# Patient Record
Sex: MALE | Race: WHITE | NOT HISPANIC OR LATINO | Employment: FULL TIME | URBAN - METROPOLITAN AREA
[De-identification: names, ages, dates, MRNs, and addresses within clinical notes are randomized per-mention and may not be internally consistent; named-entity substitution may affect disease eponyms.]

---

## 2021-03-20 ENCOUNTER — IMMUNIZATION (OUTPATIENT)
Dept: PRIMARY CARE CLINIC | Facility: CLINIC | Age: 46
End: 2021-03-20

## 2021-03-20 DIAGNOSIS — Z23 NEED FOR VACCINATION: Primary | ICD-10-CM

## 2021-03-20 PROCEDURE — 0001A PR IMMUNIZ ADMIN, SARS-COV-2 COVID-19 VACC, 30MCG/0.3ML, 1ST DOSE: ICD-10-PCS | Mod: CV19,S$GLB,, | Performed by: INTERNAL MEDICINE

## 2021-03-20 PROCEDURE — 0001A PR IMMUNIZ ADMIN, SARS-COV-2 COVID-19 VACC, 30MCG/0.3ML, 1ST DOSE: CPT | Mod: CV19,S$GLB,, | Performed by: INTERNAL MEDICINE

## 2021-03-20 PROCEDURE — 91300 PR SARS-COV- 2 COVID-19 VACCINE, NO PRSV, 30MCG/0.3ML, IM: ICD-10-PCS | Mod: S$GLB,,, | Performed by: INTERNAL MEDICINE

## 2021-03-20 PROCEDURE — 91300 PR SARS-COV- 2 COVID-19 VACCINE, NO PRSV, 30MCG/0.3ML, IM: CPT | Mod: S$GLB,,, | Performed by: INTERNAL MEDICINE

## 2021-03-20 RX ADMIN — Medication 0.3 ML: at 09:03

## 2021-04-11 ENCOUNTER — IMMUNIZATION (OUTPATIENT)
Dept: PRIMARY CARE CLINIC | Facility: CLINIC | Age: 46
End: 2021-04-11

## 2021-04-11 DIAGNOSIS — Z23 NEED FOR VACCINATION: Primary | ICD-10-CM

## 2021-04-11 PROCEDURE — 0002A PR IMMUNIZ ADMIN, SARS-COV-2 COVID-19 VACC, 30MCG/0.3ML, 2ND DOSE: CPT | Mod: CV19,S$GLB,, | Performed by: INTERNAL MEDICINE

## 2021-04-11 PROCEDURE — 0002A PR IMMUNIZ ADMIN, SARS-COV-2 COVID-19 VACC, 30MCG/0.3ML, 2ND DOSE: ICD-10-PCS | Mod: CV19,S$GLB,, | Performed by: INTERNAL MEDICINE

## 2021-04-11 PROCEDURE — 91300 PR SARS-COV- 2 COVID-19 VACCINE, NO PRSV, 30MCG/0.3ML, IM: ICD-10-PCS | Mod: S$GLB,,, | Performed by: INTERNAL MEDICINE

## 2021-04-11 PROCEDURE — 91300 PR SARS-COV- 2 COVID-19 VACCINE, NO PRSV, 30MCG/0.3ML, IM: CPT | Mod: S$GLB,,, | Performed by: INTERNAL MEDICINE

## 2021-04-11 RX ADMIN — Medication 0.3 ML: at 11:04

## 2022-10-17 ENCOUNTER — TELEPHONE (OUTPATIENT)
Dept: OTOLARYNGOLOGY | Facility: CLINIC | Age: 47
End: 2022-10-17

## 2022-10-17 ENCOUNTER — PATIENT MESSAGE (OUTPATIENT)
Dept: OTOLARYNGOLOGY | Facility: CLINIC | Age: 47
End: 2022-10-17

## 2022-10-17 NOTE — TELEPHONE ENCOUNTER
Called and LMOM advising pt that his appt on 11-11 had been rescheduled to 12-15 at 2:45pm because Dr. Carl will be in surgery on 11-11. Also sent MO message

## 2022-12-15 ENCOUNTER — OFFICE VISIT (OUTPATIENT)
Dept: OTOLARYNGOLOGY | Facility: CLINIC | Age: 47
End: 2022-12-15
Payer: COMMERCIAL

## 2022-12-15 VITALS — HEIGHT: 70 IN | WEIGHT: 259.06 LBS | BODY MASS INDEX: 37.09 KG/M2

## 2022-12-15 DIAGNOSIS — J32.0 CHRONIC MAXILLARY SINUSITIS: ICD-10-CM

## 2022-12-15 DIAGNOSIS — J34.89 NASAL OBSTRUCTION: ICD-10-CM

## 2022-12-15 DIAGNOSIS — G47.33 OBSTRUCTIVE SLEEP APNEA: Primary | ICD-10-CM

## 2022-12-15 DIAGNOSIS — J34.3 HYPERTROPHY OF INFERIOR NASAL TURBINATE: ICD-10-CM

## 2022-12-15 DIAGNOSIS — J30.2 SEASONAL ALLERGIC RHINITIS, UNSPECIFIED TRIGGER: ICD-10-CM

## 2022-12-15 PROCEDURE — 99204 PR OFFICE/OUTPT VISIT, NEW, LEVL IV, 45-59 MIN: ICD-10-PCS | Mod: S$GLB,,, | Performed by: OTOLARYNGOLOGY

## 2022-12-15 PROCEDURE — 1159F MED LIST DOCD IN RCRD: CPT | Mod: CPTII,S$GLB,, | Performed by: OTOLARYNGOLOGY

## 2022-12-15 PROCEDURE — 3008F BODY MASS INDEX DOCD: CPT | Mod: CPTII,S$GLB,, | Performed by: OTOLARYNGOLOGY

## 2022-12-15 PROCEDURE — 1159F PR MEDICATION LIST DOCUMENTED IN MEDICAL RECORD: ICD-10-PCS | Mod: CPTII,S$GLB,, | Performed by: OTOLARYNGOLOGY

## 2022-12-15 PROCEDURE — 99204 OFFICE O/P NEW MOD 45 MIN: CPT | Mod: S$GLB,,, | Performed by: OTOLARYNGOLOGY

## 2022-12-15 PROCEDURE — 3008F PR BODY MASS INDEX (BMI) DOCUMENTED: ICD-10-PCS | Mod: CPTII,S$GLB,, | Performed by: OTOLARYNGOLOGY

## 2022-12-15 RX ORDER — AZELASTINE 1 MG/ML
1 SPRAY, METERED NASAL 2 TIMES DAILY
Qty: 30 ML | Refills: 3 | Status: SHIPPED | OUTPATIENT
Start: 2022-12-15

## 2022-12-15 RX ORDER — SERTRALINE HYDROCHLORIDE 100 MG/1
TABLET, FILM COATED ORAL
COMMUNITY

## 2022-12-15 RX ORDER — ALBUTEROL SULFATE 90 UG/1
AEROSOL, METERED RESPIRATORY (INHALATION)
COMMUNITY

## 2022-12-15 RX ORDER — FLUTICASONE PROPIONATE 50 MCG
2 SPRAY, SUSPENSION (ML) NASAL 2 TIMES DAILY
Qty: 18.2 ML | Refills: 3 | Status: SHIPPED | OUTPATIENT
Start: 2022-12-15

## 2022-12-15 NOTE — PROGRESS NOTES
OTOLARYNGOLOGY CLINIC NOTE  Date:  12/15/2022     Chief complaint:  Chief Complaint   Patient presents with    inspire consult     Sleep study done 2019       History of Present Illness  Gentry Dowling is a 47 y.o. male  presenting today for a new evaluation and treatment of snoring and ROXANNE. Self referral.     [place] sleepy score 14  NOSE questionaire 60     Had a deviated septum surgery and turbinate hypertrophy in 2000, still feels like has to force one side of nose open when he breathes. Alternates nasonex and flonase.  Has also tried astelin but not saline  Gets sinus infections about 3 times per year.    Had home sleep study in 2019 and showed AHI of 6  Snores a lot, has a lot of fatigue     Past Medical History  No past medical history on file.     Past Surgical History  No past surgical history on file.     Medications  No current outpatient medications on file prior to visit.     No current facility-administered medications on file prior to visit.       Review of Systems  Review of Systems   Constitutional:  Positive for malaise/fatigue.   Eyes: Negative.    Cardiovascular: Negative.    Gastrointestinal: Negative.    Musculoskeletal:  Positive for back pain and neck pain.   Skin: Negative.    Neurological:  Positive for headaches.   Psychiatric/Behavioral:  Positive for depression.     Answers submitted by the patient for this visit:  Review of Symptoms Questionnaire  (Submitted on 12/15/2022)  appetite change : Yes  sinus pressure : Yes  Snoring?: Yes  Sleep Apnea?: Yes  Muscle aches / pain?: Yes  Seasonal Allergies?: Yes  decreased concentration: Yes  sleep disturbance: Yes    Social History        Family History  No family history on file.     Physical Exam   There were no vitals filed for this visit. Body mass index is 37.17 kg/m².          GENERAL: no acute distress.  HEAD: normocephalic.   EYES: lids and lashes normal. No scleral icterus  EARS: external ear without lesion, normal pinna shape and  position.    NOSE: external nose without significant bony abnormality, right valve collapse  but feels breathing better on right compared to left; turbinate hypertrophy, no purulent drainage  ORAL CAVITY/OROPHARYNX: tongue midline and mobile. Symmetric palate rise. Uvula midline.   NECK: trachea midline.   LYMPH NODES:No cervical lymphadenopathy.  RESPIRATORY: no stridor, no stertor. Voice normal. Respirations nonlabored.  NEURO: alert, responds to questions appropriately.   PSYCH:mood appropriate      Imaging:  The patient does not have any pertinent and/or recent imaging of the head and neck.     Labs:  CBC      BMP      COAGS        Assessment  1. Obstructive sleep apnea    2. Nasal obstruction  - CT Medtronic Sinuses without; Future    3. Chronic maxillary sinusitis  - CT Medtronic Sinuses without; Future    4. Seasonal allergic rhinitis, unspecified trigger    5. BMI 37.0-37.9, adult    6. Hypertrophy of inferior nasal turbinate       Plan:  Discussed plan of care with patient in detail and all questions answered. Patient reported understanding of plan of care.   ROXANNE: does not meet criteria for inspire due to AHI and BMI- reviewed criteria for this , if able to lose weight to get to bmi needed for next step ( DISE) would recommend PSG to ensure accurate AHI  Discussed other options such as septoplasty and nasal procedures which can help with pressure with mask but is not curative. Oral appliance is also an option.     Allergic rhinitis(AR) and recurrent sinusitis: We discussed about treatment options for this. I recommend dual nasal spray therapy as combo of steroid and antihistamine nasal spray has been shown in evidence based studies to be better than either alone.Discussed medication administration technique ( point toward outer corner of eye and not towards nasal septum) and use nasal saline prior to medication sprays. Counseled on risk of bleeding, risk of increased intraocular pressure/cataract with long  term use. We discussed importance of saline use prior to medication sprays to help sprays work better and to clean the nose. We discussed and physical documentation with photos were given to the patient about the saline and other medication sprays ( paperwork summarized discussion topics)  Ct in 2-3 weeks after back on nasal sprays   Rigid scope at follow up depending on scan and response to sprays  F/u 2 months      Please be aware that this note has been generated with the assistance of MModal voice-to-text.  Please excuse any spelling or grammatical errors.

## 2022-12-15 NOTE — PATIENT INSTRUCTIONS
"Information and instructions from your visit with me today:    Start using the following medication nasal sprays:   Fluticasone spray:    This medication is a steroid spray. It stays within the nose and does not have absorption into the body that leads to side effects that one has with oral steroid medication. Fluticasone nasal spray is the same as the Flonase brand nasal spray. Discuss with your pharmacist if the price is lower over the counter or with a prescription ( this varies depending on insurance). The medication that is over the counter is the same as the prescription medication. Use this medication as instructed on the prescription, 1-2 sprays on each side of your nose twice daily.     Azelastine  spray:  This medication is an antihistamine used to treat nasal symptoms of allergy, which works specifically in the nose unlike antihistamine pills which have more of an effect on the whole body. Use this medication as instructed on the prescription, 1 spray on each side of your nose twice daily.     Additional instructions for medication sprays  Place the tip of the medication bottle in your nose and aim slightly up and out on each side to get medication high and deep into your nose and sinuses, and not have it all deposit in the very front of your nose. Aim the tip of the nozzle towards the outer corner of your eye . You can imagine aiming towards the back of your eyeball on each side for this, as opposed to straight back to the center of your nose and head.     You need to use this medication every day regardless of symptoms, as it takes time ( a few weeks) to work and get the benefits. It does not work on an "as needed" basis like taking a decongestant. If your symptoms only occur in a particular season, then the medication can be used seasonally instead of year long. For seasonal symptoms, you should start using the spray twice daily a month before when you normally have symptoms ( for example, if symptoms " start in August, should start at the end of June).     Start nasal irrigations with saline solution- you can either use a rinse or a mist spray:      NASAL SALINE SPRAY ( simply saline and arm and hammer are examples) There are several different brands found in the cold and flu aisle of the pharmacy. You can use any brand of saline spray - this will deliver the saline by a gentle mist ( if you have difficulty or discomfort with nasal rinse/ a lot of fluid in the nose, this will be more comfortable).       Always rinse your nose with saline prior to using medication sprays and wait a couple of hours before using again. You can use the saline throughout the day to help with stuffy nose or dry nose.    Do not use nasal decongestant sprays such as Afrin or similar products long term ( over 3 days) .  This can cause long term physical nasal addiction. Afrin should only be used if having nose bleeds, severe nasal congestion , or severe ear pain/fullness and should not be used for more than 2-3 days in a row . It is a not a medication that should be used for a long period of time.     It was nice meeting you today, and I look forward to helping you feel better soon. Please don't hesitate to call if you have any other questions or concerns, or if I can be of any assistance in the meantime.      Eliz Carl MD    [facility]     Phone  [phone]    Fax      [phone]        Eliz Carl MD  Otorhinolaryngology

## 2024-11-17 ENCOUNTER — HOSPITAL ENCOUNTER (INPATIENT)
Facility: HOSPITAL | Age: 49
LOS: 10 days | Discharge: HOME | DRG: 872 | End: 2024-11-27
Attending: STUDENT IN AN ORGANIZED HEALTH CARE EDUCATION/TRAINING PROGRAM | Admitting: FAMILY MEDICINE
Payer: MEDICAID

## 2024-11-17 DIAGNOSIS — R50.9 FEVER: ICD-10-CM

## 2024-11-17 DIAGNOSIS — R07.9 CHEST PAIN: ICD-10-CM

## 2024-11-17 DIAGNOSIS — A41.9 SEPSIS WITHOUT ACUTE ORGAN DYSFUNCTION: ICD-10-CM

## 2024-11-17 DIAGNOSIS — R50.9 FEVER OF UNKNOWN ORIGIN: Primary | ICD-10-CM

## 2024-11-17 DIAGNOSIS — A41.9 SEPSIS, DUE TO UNSPECIFIED ORGANISM, UNSPECIFIED WHETHER ACUTE ORGAN DYSFUNCTION PRESENT: ICD-10-CM

## 2024-11-17 PROBLEM — F17.200 TOBACCO DEPENDENCY: Status: ACTIVE | Noted: 2024-11-17

## 2024-11-17 LAB
ALBUMIN SERPL BCP-MCNC: 2.8 G/DL (ref 3.5–5.2)
ALLENS TEST: NORMAL
ALP SERPL-CCNC: 102 U/L (ref 40–150)
ALT SERPL W/O P-5'-P-CCNC: 41 U/L (ref 10–44)
ANION GAP SERPL CALC-SCNC: 10 MMOL/L (ref 8–16)
AST SERPL-CCNC: 18 U/L (ref 10–40)
BACTERIA #/AREA URNS AUTO: NORMAL /HPF
BASOPHILS # BLD AUTO: 0.05 K/UL (ref 0–0.2)
BASOPHILS NFR BLD: 0.4 % (ref 0–1.9)
BILIRUB SERPL-MCNC: 0.9 MG/DL (ref 0.1–1)
BILIRUB UR QL STRIP: ABNORMAL
BUN SERPL-MCNC: 12 MG/DL (ref 6–20)
CALCIUM SERPL-MCNC: 9 MG/DL (ref 8.7–10.5)
CHLORIDE SERPL-SCNC: 102 MMOL/L (ref 95–110)
CK SERPL-CCNC: 144 U/L (ref 20–200)
CLARITY UR REFRACT.AUTO: CLEAR
CO2 SERPL-SCNC: 26 MMOL/L (ref 23–29)
COLOR UR AUTO: YELLOW
CREAT SERPL-MCNC: 1.1 MG/DL (ref 0.5–1.4)
DIFFERENTIAL METHOD BLD: ABNORMAL
EOSINOPHIL # BLD AUTO: 0 K/UL (ref 0–0.5)
EOSINOPHIL NFR BLD: 0.3 % (ref 0–8)
ERYTHROCYTE [DISTWIDTH] IN BLOOD BY AUTOMATED COUNT: 13 % (ref 11.5–14.5)
EST. GFR  (NO RACE VARIABLE): >60 ML/MIN/1.73 M^2
GLUCOSE SERPL-MCNC: 97 MG/DL (ref 70–110)
GLUCOSE UR QL STRIP: NEGATIVE
HCT VFR BLD AUTO: 41.5 % (ref 40–54)
HCV AB SERPL QL IA: NORMAL
HGB BLD-MCNC: 13.6 G/DL (ref 14–18)
HGB UR QL STRIP: NEGATIVE
HIV 1+2 AB+HIV1 P24 AG SERPL QL IA: NORMAL
HYALINE CASTS UR QL AUTO: 0 /LPF
IMM GRANULOCYTES # BLD AUTO: 0.05 K/UL (ref 0–0.04)
IMM GRANULOCYTES NFR BLD AUTO: 0.4 % (ref 0–0.5)
INFLUENZA A, MOLECULAR: NEGATIVE
INFLUENZA B, MOLECULAR: NEGATIVE
KETONES UR QL STRIP: ABNORMAL
LDH SERPL L TO P-CCNC: 0.83 MMOL/L (ref 0.5–2.2)
LEUKOCYTE ESTERASE UR QL STRIP: NEGATIVE
LYMPHOCYTES # BLD AUTO: 1.1 K/UL (ref 1–4.8)
LYMPHOCYTES NFR BLD: 8.2 % (ref 18–48)
MCH RBC QN AUTO: 28.2 PG (ref 27–31)
MCHC RBC AUTO-ENTMCNC: 32.8 G/DL (ref 32–36)
MCV RBC AUTO: 86 FL (ref 82–98)
MICROSCOPIC COMMENT: NORMAL
MONOCYTES # BLD AUTO: 0.7 K/UL (ref 0.3–1)
MONOCYTES NFR BLD: 5.4 % (ref 4–15)
NEUTROPHILS # BLD AUTO: 11.6 K/UL (ref 1.8–7.7)
NEUTROPHILS NFR BLD: 85.3 % (ref 38–73)
NITRITE UR QL STRIP: NEGATIVE
NRBC BLD-RTO: 0 /100 WBC
PH UR STRIP: 6 [PH] (ref 5–8)
PLATELET # BLD AUTO: 366 K/UL (ref 150–450)
PMV BLD AUTO: 9 FL (ref 9.2–12.9)
POTASSIUM SERPL-SCNC: 3.8 MMOL/L (ref 3.5–5.1)
PROT SERPL-MCNC: 7.1 G/DL (ref 6–8.4)
PROT UR QL STRIP: ABNORMAL
RBC # BLD AUTO: 4.82 M/UL (ref 4.6–6.2)
RBC #/AREA URNS AUTO: 2 /HPF (ref 0–4)
SAMPLE: NORMAL
SARS-COV-2 RDRP RESP QL NAA+PROBE: NEGATIVE
SITE: NORMAL
SODIUM SERPL-SCNC: 138 MMOL/L (ref 136–145)
SP GR UR STRIP: 1.02 (ref 1–1.03)
SPECIMEN SOURCE: NORMAL
SQUAMOUS #/AREA URNS AUTO: 1 /HPF
URN SPEC COLLECT METH UR: ABNORMAL
WBC # BLD AUTO: 13.62 K/UL (ref 3.9–12.7)
WBC #/AREA URNS AUTO: 3 /HPF (ref 0–5)

## 2024-11-17 PROCEDURE — 99900035 HC TECH TIME PER 15 MIN (STAT)

## 2024-11-17 PROCEDURE — 83605 ASSAY OF LACTIC ACID: CPT

## 2024-11-17 PROCEDURE — 93005 ELECTROCARDIOGRAM TRACING: CPT

## 2024-11-17 PROCEDURE — 81001 URINALYSIS AUTO W/SCOPE: CPT

## 2024-11-17 PROCEDURE — 21400001 HC TELEMETRY ROOM

## 2024-11-17 PROCEDURE — 87635 SARS-COV-2 COVID-19 AMP PRB: CPT

## 2024-11-17 PROCEDURE — 99285 EMERGENCY DEPT VISIT HI MDM: CPT | Mod: 25

## 2024-11-17 PROCEDURE — 87040 BLOOD CULTURE FOR BACTERIA: CPT

## 2024-11-17 PROCEDURE — 25000003 PHARM REV CODE 250

## 2024-11-17 PROCEDURE — 25000003 PHARM REV CODE 250: Performed by: PHYSICIAN ASSISTANT

## 2024-11-17 PROCEDURE — 80053 COMPREHEN METABOLIC PANEL: CPT

## 2024-11-17 PROCEDURE — 93010 ELECTROCARDIOGRAM REPORT: CPT | Mod: ,,, | Performed by: STUDENT IN AN ORGANIZED HEALTH CARE EDUCATION/TRAINING PROGRAM

## 2024-11-17 PROCEDURE — 82550 ASSAY OF CK (CPK): CPT

## 2024-11-17 PROCEDURE — 87389 HIV-1 AG W/HIV-1&-2 AB AG IA: CPT

## 2024-11-17 PROCEDURE — 86803 HEPATITIS C AB TEST: CPT

## 2024-11-17 PROCEDURE — 85025 COMPLETE CBC W/AUTO DIFF WBC: CPT

## 2024-11-17 PROCEDURE — 96374 THER/PROPH/DIAG INJ IV PUSH: CPT

## 2024-11-17 PROCEDURE — 87502 INFLUENZA DNA AMP PROBE: CPT

## 2024-11-17 PROCEDURE — 11000001 HC ACUTE MED/SURG PRIVATE ROOM

## 2024-11-17 PROCEDURE — 63600175 PHARM REV CODE 636 W HCPCS

## 2024-11-17 RX ORDER — ACETAMINOPHEN 325 MG/1
650 TABLET ORAL
Status: COMPLETED | OUTPATIENT
Start: 2024-11-17 | End: 2024-11-17

## 2024-11-17 RX ORDER — IBUPROFEN 200 MG
24 TABLET ORAL
Status: DISCONTINUED | OUTPATIENT
Start: 2024-11-17 | End: 2024-11-27 | Stop reason: HOSPADM

## 2024-11-17 RX ORDER — ENOXAPARIN SODIUM 100 MG/ML
40 INJECTION SUBCUTANEOUS EVERY 24 HOURS
Status: DISCONTINUED | OUTPATIENT
Start: 2024-11-18 | End: 2024-11-23

## 2024-11-17 RX ORDER — ACETAMINOPHEN 325 MG/1
650 TABLET ORAL EVERY 4 HOURS PRN
Status: DISCONTINUED | OUTPATIENT
Start: 2024-11-17 | End: 2024-11-27 | Stop reason: HOSPADM

## 2024-11-17 RX ORDER — TALC
6 POWDER (GRAM) TOPICAL NIGHTLY PRN
Status: DISCONTINUED | OUTPATIENT
Start: 2024-11-17 | End: 2024-11-27 | Stop reason: HOSPADM

## 2024-11-17 RX ORDER — POLYETHYLENE GLYCOL 3350 17 G/17G
17 POWDER, FOR SOLUTION ORAL DAILY PRN
Status: DISCONTINUED | OUTPATIENT
Start: 2024-11-17 | End: 2024-11-27 | Stop reason: HOSPADM

## 2024-11-17 RX ORDER — IBUPROFEN 200 MG
16 TABLET ORAL
Status: DISCONTINUED | OUTPATIENT
Start: 2024-11-17 | End: 2024-11-27 | Stop reason: HOSPADM

## 2024-11-17 RX ORDER — DOCUSATE SODIUM 100 MG
300 CAPSULE ORAL
Status: DISCONTINUED | OUTPATIENT
Start: 2024-11-17 | End: 2024-11-27 | Stop reason: HOSPADM

## 2024-11-17 RX ORDER — GLUCAGON 1 MG
1 KIT INJECTION
Status: DISCONTINUED | OUTPATIENT
Start: 2024-11-17 | End: 2024-11-27 | Stop reason: HOSPADM

## 2024-11-17 RX ORDER — BISACODYL 10 MG/1
10 SUPPOSITORY RECTAL DAILY PRN
Status: DISCONTINUED | OUTPATIENT
Start: 2024-11-17 | End: 2024-11-27 | Stop reason: HOSPADM

## 2024-11-17 RX ORDER — IPRATROPIUM BROMIDE AND ALBUTEROL SULFATE 2.5; .5 MG/3ML; MG/3ML
3 SOLUTION RESPIRATORY (INHALATION) EVERY 4 HOURS PRN
Status: DISCONTINUED | OUTPATIENT
Start: 2024-11-17 | End: 2024-11-27 | Stop reason: HOSPADM

## 2024-11-17 RX ORDER — IBUPROFEN 200 MG
1 TABLET ORAL DAILY PRN
Status: DISCONTINUED | OUTPATIENT
Start: 2024-11-17 | End: 2024-11-17

## 2024-11-17 RX ORDER — KETOROLAC TROMETHAMINE 30 MG/ML
10 INJECTION, SOLUTION INTRAMUSCULAR; INTRAVENOUS
Status: COMPLETED | OUTPATIENT
Start: 2024-11-17 | End: 2024-11-17

## 2024-11-17 RX ADMIN — ACETAMINOPHEN 650 MG: 325 TABLET ORAL at 06:11

## 2024-11-17 RX ADMIN — PIPERACILLIN SODIUM AND TAZOBACTAM SODIUM 4.5 G: 4; .5 INJECTION, POWDER, FOR SOLUTION INTRAVENOUS at 07:11

## 2024-11-17 RX ADMIN — KETOROLAC TROMETHAMINE 10 MG: 30 INJECTION, SOLUTION INTRAMUSCULAR; INTRAVENOUS at 06:11

## 2024-11-17 RX ADMIN — SODIUM CHLORIDE 1000 ML: 9 INJECTION, SOLUTION INTRAVENOUS at 07:11

## 2024-11-17 RX ADMIN — VANCOMYCIN HYDROCHLORIDE 2000 MG: 10 INJECTION, POWDER, LYOPHILIZED, FOR SOLUTION INTRAVENOUS at 07:11

## 2024-11-17 RX ADMIN — ACETAMINOPHEN 650 MG: 325 TABLET ORAL at 10:11

## 2024-11-17 NOTE — ED PROVIDER NOTES
Encounter Date: 11/17/2024       History     Chief Complaint   Patient presents with    Fever     Pt reports intermittent fever for about a week. Pt reports highest temp of 101.6. Pt has been taking tylenol.     48-year-old male with no significant past medical history presents to the ED regarding fever and fatigue onset 1 week.  He has been taking Tylenol drinking Pedialyte.  States his temperature will alternate from 94° F all the way to 101.7° F accompanied with alternating chills and sweats. Also complaining of cramps to lower legs and hands. Does have mild nonproductive cough with no other cold-like symptoms. Also right nostril occluded upon wakening though present prior to fever. Denies chest pain or shortness of breath. Denies abdominal pain, nausea, vomiting, neck pain/stiffness, urinary symptoms, or other complaints at this time.    The history is provided by the patient and medical records.     Review of patient's allergies indicates:  No Known Allergies  History reviewed. No pertinent past medical history.  History reviewed. No pertinent surgical history.  No family history on file.  Social History     Tobacco Use    Smoking status: Some Days     Types: Cigars    Smokeless tobacco: Former     Types: Chew     Review of Systems  See HPI  Physical Exam     Initial Vitals [11/17/24 1609]   BP Pulse Resp Temp SpO2   (!) 148/65 (!) 126 18 99.3 °F (37.4 °C) 99 %      MAP       --         Physical Exam    Vitals reviewed.  Constitutional: He appears well-developed and well-nourished. He is not diaphoretic. No distress.   HENT:   Head: Normocephalic and atraumatic.   Neck: Neck supple.   Normal range of motion.  Cardiovascular:  Regular rhythm and normal heart sounds.   Tachycardia present.   Exam reveals no gallop and no friction rub.       No murmur heard.  Pulmonary/Chest: Breath sounds normal. No respiratory distress. He has no wheezes. He has no rhonchi. He has no rales.   Abdominal: Abdomen is soft. There is  no abdominal tenderness.   Musculoskeletal:      Cervical back: Normal range of motion and neck supple.     Neurological: He is alert and oriented to person, place, and time.   Psychiatric: He has a normal mood and affect.         ED Course   Procedures  Labs Reviewed   CBC W/ AUTO DIFFERENTIAL - Abnormal       Result Value    WBC 13.62 (*)     RBC 4.82      Hemoglobin 13.6 (*)     Hematocrit 41.5      MCV 86      MCH 28.2      MCHC 32.8      RDW 13.0      Platelets 366      MPV 9.0 (*)     Immature Granulocytes 0.4      Gran # (ANC) 11.6 (*)     Immature Grans (Abs) 0.05 (*)     Lymph # 1.1      Mono # 0.7      Eos # 0.0      Baso # 0.05      nRBC 0      Gran % 85.3 (*)     Lymph % 8.2 (*)     Mono % 5.4      Eosinophil % 0.3      Basophil % 0.4      Differential Method Automated     COMPREHENSIVE METABOLIC PANEL - Abnormal    Sodium 138      Potassium 3.8      Chloride 102      CO2 26      Glucose 97      BUN 12      Creatinine 1.1      Calcium 9.0      Total Protein 7.1      Albumin 2.8 (*)     Total Bilirubin 0.9      Alkaline Phosphatase 102      AST 18      ALT 41      eGFR >60.0      Anion Gap 10     URINALYSIS, REFLEX TO URINE CULTURE - Abnormal    Specimen UA Urine, Clean Catch      Color, UA Yellow      Appearance, UA Clear      pH, UA 6.0      Specific Gravity, UA 1.025      Protein, UA 1+ (*)     Glucose, UA Negative      Ketones, UA 2+ (*)     Bilirubin (UA) 1+ (*)     Occult Blood UA Negative      Nitrite, UA Negative      Leukocytes, UA Negative      Narrative:     Specimen Source->Urine   INFLUENZA A & B BY MOLECULAR    Influenza A, Molecular Negative      Influenza B, Molecular Negative      Flu A & B Source Nasal swab     HEPATITIS C ANTIBODY    Hepatitis C Ab Non-reactive      Narrative:     Release to patient->Immediate   HIV 1 / 2 ANTIBODY    HIV 1/2 Ag/Ab Non-reactive      Narrative:     Release to patient->Immediate   SARS-COV-2 RNA AMPLIFICATION, QUAL    SARS-CoV-2 RNA, Amplification, Qual  Negative     CK         URINALYSIS MICROSCOPIC    RBC, UA 2      WBC, UA 3      Bacteria Occasional      Squam Epithel, UA 1      Hyaline Casts, UA 0      Microscopic Comment SEE COMMENT      Narrative:     Specimen Source->Urine   ISTAT LACTATE    POC Lactate 0.83      Sample VENOUS      Site Other      Allens Test N/A            Imaging Results               CT Chest Abdomen Pelvis With IV Contrast (XPD) NO Oral Contrast (Final result)  Result time 11/18/24 08:21:50      Final result by Osmany Stone MD (11/18/24 08:21:50)                   Impression:      Posterior right hepatic lobe heterogeneously enhancing/hypoenhancing area.  The abrupt diminished enhancement of posterior right portal vein raises suspicion for thrombosis and associated hepatic infarction.  Other underlying etiology including infection/inflammation not excluded.  Clinical correlation advised.    Symmetric bilateral perinephric fat stranding with unknown chronicity.  Urinalysis can be attempted to rule out infectious process.    Mild hepatosplenomegaly.    Small volume pericardial effusion with indeterminate clinical significance.    Mild prostatomegaly.    Additional findings as above.    This report was flagged in Epic as abnormal.    Electronically signed by resident: Muna Frost  Date:    11/18/2024  Time:    07:12    Electronically signed by: Osmany Stone  Date:    11/18/2024  Time:    08:21               Narrative:    EXAMINATION:  CT CHEST ABDOMEN PELVIS WITH IV CONTRAST (XPD)    CLINICAL HISTORY:  Sepsis;    TECHNIQUE:  Using 100 cc of  Omnipaque 350 IV contrast, and multi-detector helical CT technique, axial CT images of the chest, abdomen and pelvis were obtained. 2D post-processing coronal and sagittal reconstructions was performed.    COMPARISON:  None    FINDINGS:  Thoracic soft tissues: Gynecomastia.    Heart: Small volume pericardial effusion.  No cardiomegaly.    Pulmonary vasculature:  Unremarkable.    Mediastinum/hilum: Unremarkable.    Central airway/esophagus: Tiny mid trachea right posterior-lateral diverticulum.    Lungs: Mild left lung base linear atelectasis.  No consolidation.  No significant pleural effusion or pneumothorax.    Liver: Mildly enlarged measuring 19.5 cm.No focal lesion.Posterior right lobe heterogeneous enhancing area measuring approximately 10.7 x 4.6 cm.    Gallbladder: Unremarkable.    Bile Ducts: No intra or extrahepatic biliary ductal dilation.    Spleen: Mildly enlarged measuring 13.5 cm.  Splenule present.    Pancreas: No acute abnormality.    Adrenals: Unremarkable.    Genitourinary: Symmetric bilateral perinephric fat stranding.  Question bilateral kidney small calcifications, more in number on the left side, versus early contrast excretion..  Bilateral ureters are normal in course and caliber.  No hydroureteronephrosis.  Bladder demonstrates no acute abnormality.    Reproductive organs: Mild prostatomegaly.    GI tract/Mesentery: Stomach is of normal appearance.  Visualized loops of small and large bowels are normal in caliber without evidence for inflammation or obstruction. Appendix unremarkable.  Sigmoid diverticulosis.    Peritoneal Space: No abdominopelvic ascites or intraperitoneal free air.    Lymph nodes: No significant adenopathy.    Vasculature: Normal caliber of abdominal aorta with no significant atherosclerosis.  There is abrupt diminishing posterior right portal vein enhancement.    Abdominal wall: Small fat containing umbilical hernia tiny left fat containing inguinal hernia.    Bones: Mild degenerative changes.  Grade 1 retrolisthesis of L5 on S1.                                       CT Head Without Contrast (Final result)  Result time 11/18/24 05:32:26      Final result by Salma Ann MD (11/18/24 05:32:26)                   Impression:      No CT evidence of acute intracranial abnormality allowing for patient motion artifact..  Clinical  correlation and further assessment with MRI as warranted.      Electronically signed by: Salma Ann MD  Date:    11/18/2024  Time:    05:32               Narrative:    EXAMINATION:  CT HEAD WITHOUT CONTRAST    CLINICAL HISTORY:  Meningitis/CNS infection suspected;head trauma;    TECHNIQUE:  Low dose axial images were obtained through the head.  Coronal and sagittal reformations were also performed. Contrast was not administered.    COMPARISON:  None.    FINDINGS:  Image quality is mildly degraded by motion artifact.  Allowing for this, there is no acute intracranial hemorrhage, hydrocephalus, midline shift or mass effect. Gray-white matter differentiation appears maintained. The basal cisterns are patent. The visualized paranasal sinuses and mastoid air cells are clear of acute process.  The visualized bones of the calvarium demonstrate no acute osseous abnormality.                                       X-Ray Chest PA And Lateral (Final result)  Result time 11/17/24 18:32:39   Procedure changed from X-Ray Chest AP Portable     Final result by Loco Apodaca MD (11/17/24 18:32:39)                   Impression:      No consolidation, radiographic evidence of pneumonia or other source of cough/shortness of breath, noting that early/mild viral pneumonia or nonspecific bronchitis may be radiographically occult.      Electronically signed by: Loco Apodaca MD  Date:    11/17/2024  Time:    18:32               Narrative:    EXAMINATION:  XR CHEST PA AND LATERAL    CLINICAL HISTORY:  fever;    TECHNIQUE:  PA and lateral views of the chest were performed.    COMPARISON:  None    FINDINGS:  Trachea is relatively midline and patent.Bibasilar minimal platelike scarring versus atelectasis.  The lungs are otherwise symmetrically well expanded without consolidation, pleural effusion or pneumothorax.    The cardiac silhouette is normal in size. The pulmonary vasculature and hilar and mediastinal contours are within normal  limits.    Osseous structures show minimal degenerative change and otherwise appear intact.                                       Medications   electrolytes-dextrose (Pedialyte) oral solution 300 mL (300 mLs Oral Not Given 11/18/24 1115)   enoxaparin injection 40 mg (has no administration in time range)   albuterol-ipratropium 2.5 mg-0.5 mg/3 mL nebulizer solution 3 mL (has no administration in time range)   melatonin tablet 6 mg (has no administration in time range)   polyethylene glycol packet 17 g (has no administration in time range)   bisacodyL suppository 10 mg (has no administration in time range)   acetaminophen tablet 650 mg (650 mg Oral Given 11/18/24 0440)   glucose chewable tablet 16 g (has no administration in time range)   glucose chewable tablet 24 g (has no administration in time range)   glucagon (human recombinant) injection 1 mg (has no administration in time range)   dextrose 10% bolus 125 mL 125 mL (has no administration in time range)   dextrose 10% bolus 250 mL 250 mL (has no administration in time range)   vancomycin - pharmacy to dose (has no administration in time range)   piperacillin-tazobactam (ZOSYN) 4.5 g in D5W 100 mL IVPB (MB+) (4.5 g Intravenous New Bag 11/18/24 1240)   benzonatate capsule 100 mg (100 mg Oral Given 11/18/24 0255)   vancomycin 750 mg in D5W 250 mL IVPB (admixture device) (has no administration in time range)   fluticasone propionate 50 mcg/actuation nasal spray 100 mcg (has no administration in time range)   acetaminophen tablet 650 mg (650 mg Oral Given 11/17/24 1847)   ketorolac injection 9.999 mg (9.999 mg Intravenous Given 11/17/24 1847)   sodium chloride 0.9% bolus 1,000 mL 1,000 mL (0 mLs Intravenous Stopped 11/17/24 1936)   piperacillin-tazobactam (ZOSYN) 4.5 g in D5W 100 mL IVPB (MB+) (0 g Intravenous Stopped 11/17/24 1957)   vancomycin 2 g in dextrose 5 % 500 mL IVPB (0 mg Intravenous Stopped 11/17/24 2159)   iohexoL (OMNIPAQUE 350) injection 100 mL (100 mLs  "Intravenous Given 11/18/24 1640)     Medical Decision Making  Emergent evaluation of 48-year-old male regarding fever and fatigue onset 1 week.  Tachycardic and mildly hypertensive with other vitals stable.  Nontoxic appearing, in no acute distress.  See physical exam findings above. 2/4 sirs with home fevers and tachycardia, will obtain blood cultures and lactate with labs.     My differential diagnoses include but are not limited to: Viral URI, pneumonia, UTI, electrolyte abnormality, LOGAN, rhabdomyolysis  See ED course.    Amount and/or Complexity of Data Reviewed  Labs: ordered. Decision-making details documented in ED Course.  Radiology: ordered.    Risk  OTC drugs.  Prescription drug management.  Decision regarding hospitalization.  Risk Details: This patient does not have evidence of infective focus  My overall impression is sepsis.  Source: Unknown  Antibiotics given- Antibiotics (72h ago, onward)    Start     Stop Route Frequency Ordered    11/18/24 1100  vancomycin 750 mg in D5W 250 mL IVPB (admixture device)           -- IV Once 11/18/24 0951    11/18/24 0330  piperacillin-tazobactam (ZOSYN) 4.5 g in D5W 100 mL IVPB   (MB+)         -- IV Every 8 hours (non-standard times) 11/17/24 2359    11/18/24 0058  vancomycin - pharmacy to dose  (vancomycin IVPB (PEDS and   ADULTS))        Placed in "And" Linked Group    -- IV pharmacy to manage frequency 11/17/24 2358      Latest lactate reviewed-  Lab             11/17/24                       1802          POCLAC       0.83            Fluid challenge Other- Patient to receive 1 L volume other than 30cc/kg due to national IVF shortage     Post- resuscitation assessment Yes Perfusion exam was performed within 6 hours of septic shock presentation after bolus shows Adequate tissue perfusion assessed by non-invasive monitoring       Will Not start Pressors- Levophed for MAP of 65      Critical Care  Total time providing critical care: 35 minutes               ED Course " as of 11/18/24 1257   Sun Nov 17, 2024   1706 Pulse(!): 126 [KB]   1807 POC Lactate: 0.83 [KB]   1808 Urinalysis, Reflex to Urine Culture Urine, Clean Catch(!)  Does not appear infected [KB]   1813 WBC(!): 13.62 [KB]   1813 SARS-CoV-2 RNA, Amplification, Qual: Negative [KB]   1813 Influenza A, Molecular: Negative [KB]   1813 Influenza B, Molecular: Negative [KB]   1839 Comprehensive metabolic panel(!)  No electrolyte or metabolic derangement.  No LOGAN.  No elevated LFTs or hyperbilirubinemia [KB]   1839 CPK: 144 [KB]   1907 BP: 93/63 [KB]   1908 Temp(!): 102 °F (38.9 °C) [KB]   1908 Pulse(!): 119 [KB]   1908 Informed by RN that patient became hypotensive, febrile, and tachycardic. Will start on broad-spectrum antibiotics.  Unclear source but given sepsis will admit to Hospital Medicine for further workup and treatment [KB]      ED Course User Index  [KB] Trish Oneill PA-C                           Clinical Impression:  Final diagnoses:  [R50.9] Fever of unknown origin (Primary)  [A41.9] Sepsis, due to unspecified organism, unspecified whether acute organ dysfunction present  [R07.9] Chest pain  [R50.9] Fever          ED Disposition Condition    Admit Stable                Trish Oneill PA-C  11/18/24 1259

## 2024-11-17 NOTE — ED TRIAGE NOTES
Gentry Dowling, a 48 y.o. male presents to the ED w/ complaint of Fever on and off for the last week.     Triage note:  Chief Complaint   Patient presents with    Fever     Pt reports intermittent fever for about a week. Pt reports highest temp of 101.6. Pt has been taking tylenol.     Review of patient's allergies indicates:  No Known Allergies  No past medical history on file.      APPEARANCE: awake and alert in NAD. PAIN  0/10  SKIN: warm, dry and intact. No breakdown or bruising.  MUSCULOSKELETAL: Patient moving all extremities spontaneously, no obvious swelling or deformities noted. Ambulates independently.  RESPIRATORY: Denies shortness of breath.Respirations unlabored.   CARDIAC: Denies CP, 2+ distal pulses; no peripheral edema  ABDOMEN: S/ND/NT, Denies nausea  : voids spontaneously, denies difficulty  Neurologic: AAO x 4; follows commands equal strength in all extremities; denies numbness/tingling. Denies dizziness

## 2024-11-18 PROBLEM — F17.200 TOBACCO DEPENDENCY: Status: RESOLVED | Noted: 2024-11-17 | Resolved: 2024-11-18

## 2024-11-18 PROBLEM — S09.90XA HEAD TRAUMA: Status: ACTIVE | Noted: 2024-11-18

## 2024-11-18 LAB
ANION GAP SERPL CALC-SCNC: 13 MMOL/L (ref 8–16)
BASOPHILS # BLD AUTO: 0.06 K/UL (ref 0–0.2)
BASOPHILS NFR BLD: 0.4 % (ref 0–1.9)
BUN SERPL-MCNC: 15 MG/DL (ref 6–20)
CALCIUM SERPL-MCNC: 8.8 MG/DL (ref 8.7–10.5)
CHLORIDE SERPL-SCNC: 104 MMOL/L (ref 95–110)
CO2 SERPL-SCNC: 24 MMOL/L (ref 23–29)
CREAT SERPL-MCNC: 1.5 MG/DL (ref 0.5–1.4)
DIFFERENTIAL METHOD BLD: ABNORMAL
EOSINOPHIL # BLD AUTO: 0.1 K/UL (ref 0–0.5)
EOSINOPHIL NFR BLD: 0.5 % (ref 0–8)
ERYTHROCYTE [DISTWIDTH] IN BLOOD BY AUTOMATED COUNT: 13.2 % (ref 11.5–14.5)
EST. GFR  (NO RACE VARIABLE): 57.1 ML/MIN/1.73 M^2
ESTIMATED AVG GLUCOSE: 97 MG/DL (ref 68–131)
GLUCOSE SERPL-MCNC: 92 MG/DL (ref 70–110)
HBA1C MFR BLD: 5 % (ref 4–5.6)
HCT VFR BLD AUTO: 40.4 % (ref 40–54)
HGB BLD-MCNC: 13.3 G/DL (ref 14–18)
IMM GRANULOCYTES # BLD AUTO: 0.07 K/UL (ref 0–0.04)
IMM GRANULOCYTES NFR BLD AUTO: 0.5 % (ref 0–0.5)
LYMPHOCYTES # BLD AUTO: 1.5 K/UL (ref 1–4.8)
LYMPHOCYTES NFR BLD: 10.5 % (ref 18–48)
MAGNESIUM SERPL-MCNC: 2.3 MG/DL (ref 1.6–2.6)
MCH RBC QN AUTO: 29.1 PG (ref 27–31)
MCHC RBC AUTO-ENTMCNC: 32.9 G/DL (ref 32–36)
MCV RBC AUTO: 88 FL (ref 82–98)
MONOCYTES # BLD AUTO: 0.9 K/UL (ref 0.3–1)
MONOCYTES NFR BLD: 6.4 % (ref 4–15)
NEUTROPHILS # BLD AUTO: 11.9 K/UL (ref 1.8–7.7)
NEUTROPHILS NFR BLD: 81.7 % (ref 38–73)
NRBC BLD-RTO: 0 /100 WBC
OHS QRS DURATION: 90 MS
OHS QTC CALCULATION: 424 MS
PHOSPHATE SERPL-MCNC: 3.6 MG/DL (ref 2.7–4.5)
PLATELET # BLD AUTO: 354 K/UL (ref 150–450)
PMV BLD AUTO: 9.4 FL (ref 9.2–12.9)
POTASSIUM SERPL-SCNC: 3.7 MMOL/L (ref 3.5–5.1)
RBC # BLD AUTO: 4.57 M/UL (ref 4.6–6.2)
SODIUM SERPL-SCNC: 141 MMOL/L (ref 136–145)
VANCOMYCIN SERPL-MCNC: 17 UG/ML
WBC # BLD AUTO: 14.59 K/UL (ref 3.9–12.7)

## 2024-11-18 PROCEDURE — 25000003 PHARM REV CODE 250

## 2024-11-18 PROCEDURE — 63600175 PHARM REV CODE 636 W HCPCS: Performed by: PHYSICIAN ASSISTANT

## 2024-11-18 PROCEDURE — 25000003 PHARM REV CODE 250: Performed by: PHYSICIAN ASSISTANT

## 2024-11-18 PROCEDURE — 25000003 PHARM REV CODE 250: Performed by: FAMILY MEDICINE

## 2024-11-18 PROCEDURE — 21400001 HC TELEMETRY ROOM

## 2024-11-18 PROCEDURE — 25000242 PHARM REV CODE 250 ALT 637 W/ HCPCS: Performed by: STUDENT IN AN ORGANIZED HEALTH CARE EDUCATION/TRAINING PROGRAM

## 2024-11-18 PROCEDURE — 25000003 PHARM REV CODE 250: Performed by: STUDENT IN AN ORGANIZED HEALTH CARE EDUCATION/TRAINING PROGRAM

## 2024-11-18 PROCEDURE — 83036 HEMOGLOBIN GLYCOSYLATED A1C: CPT | Performed by: PHYSICIAN ASSISTANT

## 2024-11-18 PROCEDURE — 25500020 PHARM REV CODE 255: Performed by: STUDENT IN AN ORGANIZED HEALTH CARE EDUCATION/TRAINING PROGRAM

## 2024-11-18 PROCEDURE — 84100 ASSAY OF PHOSPHORUS: CPT | Performed by: PHYSICIAN ASSISTANT

## 2024-11-18 PROCEDURE — 80048 BASIC METABOLIC PNL TOTAL CA: CPT | Performed by: PHYSICIAN ASSISTANT

## 2024-11-18 PROCEDURE — 85025 COMPLETE CBC W/AUTO DIFF WBC: CPT | Performed by: PHYSICIAN ASSISTANT

## 2024-11-18 PROCEDURE — 83735 ASSAY OF MAGNESIUM: CPT | Performed by: PHYSICIAN ASSISTANT

## 2024-11-18 PROCEDURE — 63600175 PHARM REV CODE 636 W HCPCS: Performed by: STUDENT IN AN ORGANIZED HEALTH CARE EDUCATION/TRAINING PROGRAM

## 2024-11-18 PROCEDURE — 36415 COLL VENOUS BLD VENIPUNCTURE: CPT | Performed by: STUDENT IN AN ORGANIZED HEALTH CARE EDUCATION/TRAINING PROGRAM

## 2024-11-18 PROCEDURE — 36415 COLL VENOUS BLD VENIPUNCTURE: CPT | Performed by: PHYSICIAN ASSISTANT

## 2024-11-18 PROCEDURE — 80202 ASSAY OF VANCOMYCIN: CPT | Performed by: STUDENT IN AN ORGANIZED HEALTH CARE EDUCATION/TRAINING PROGRAM

## 2024-11-18 RX ORDER — BENZONATATE 100 MG/1
100 CAPSULE ORAL 3 TIMES DAILY PRN
Status: DISCONTINUED | OUTPATIENT
Start: 2024-11-18 | End: 2024-11-27 | Stop reason: HOSPADM

## 2024-11-18 RX ORDER — FLUTICASONE PROPIONATE 50 MCG
2 SPRAY, SUSPENSION (ML) NASAL DAILY
Status: DISCONTINUED | OUTPATIENT
Start: 2024-11-18 | End: 2024-11-27 | Stop reason: HOSPADM

## 2024-11-18 RX ORDER — SIMETHICONE 80 MG
1 TABLET,CHEWABLE ORAL 3 TIMES DAILY PRN
Status: DISCONTINUED | OUTPATIENT
Start: 2024-11-18 | End: 2024-11-18

## 2024-11-18 RX ADMIN — PIPERACILLIN SODIUM AND TAZOBACTAM SODIUM 4.5 G: 4; .5 INJECTION, POWDER, FOR SOLUTION INTRAVENOUS at 08:11

## 2024-11-18 RX ADMIN — Medication 300 ML: at 07:11

## 2024-11-18 RX ADMIN — PIPERACILLIN SODIUM AND TAZOBACTAM SODIUM 4.5 G: 4; .5 INJECTION, POWDER, FOR SOLUTION INTRAVENOUS at 12:11

## 2024-11-18 RX ADMIN — ENOXAPARIN SODIUM 40 MG: 40 INJECTION SUBCUTANEOUS at 04:11

## 2024-11-18 RX ADMIN — ACETAMINOPHEN 650 MG: 325 TABLET ORAL at 04:11

## 2024-11-18 RX ADMIN — PIPERACILLIN SODIUM AND TAZOBACTAM SODIUM 4.5 G: 4; .5 INJECTION, POWDER, FOR SOLUTION INTRAVENOUS at 04:11

## 2024-11-18 RX ADMIN — Medication 6 MG: at 08:11

## 2024-11-18 RX ADMIN — GUAIFENESIN AND DEXTROMETHORPHAN HYDROBROMIDE 1 TABLET: 600; 30 TABLET, EXTENDED RELEASE ORAL at 09:11

## 2024-11-18 RX ADMIN — FLUTICASONE PROPIONATE 100 MCG: 50 SPRAY, METERED NASAL at 03:11

## 2024-11-18 RX ADMIN — ACETAMINOPHEN 650 MG: 325 TABLET ORAL at 03:11

## 2024-11-18 RX ADMIN — BENZONATATE 100 MG: 100 CAPSULE ORAL at 02:11

## 2024-11-18 RX ADMIN — IOHEXOL 100 ML: 350 INJECTION, SOLUTION INTRAVENOUS at 04:11

## 2024-11-18 RX ADMIN — Medication 300 ML: at 03:11

## 2024-11-18 RX ADMIN — VANCOMYCIN HYDROCHLORIDE 750 MG: 750 INJECTION, POWDER, LYOPHILIZED, FOR SOLUTION INTRAVENOUS at 11:11

## 2024-11-18 RX ADMIN — BENZONATATE 100 MG: 100 CAPSULE ORAL at 08:11

## 2024-11-18 NOTE — ASSESSMENT & PLAN NOTE
- reports fall which possibly happened in his sleep (?) w/ head trauma, unknown LOC  - will check CTH given headaches, can also eval sinuses - negative  - neuro checks q4h

## 2024-11-18 NOTE — SUBJECTIVE & OBJECTIVE
History reviewed. No pertinent past medical history.    History reviewed. No pertinent surgical history.    Review of patient's allergies indicates:  No Known Allergies    No current facility-administered medications on file prior to encounter.     Current Outpatient Medications on File Prior to Encounter   Medication Sig    albuterol (PROVENTIL/VENTOLIN HFA) 90 mcg/actuation inhaler albuterol sulfate HFA 90 mcg/actuation aerosol inhaler   INHALE 1 PUFF BY MOUTH EVERY 4 TO 6 HOURS AS NEEDED FOR SHORTNESS OF BREATH    azelastine (ASTELIN) 137 mcg (0.1 %) nasal spray 1 spray (137 mcg total) by Nasal route 2 (two) times daily.    fluticasone propionate (FLONASE) 50 mcg/actuation nasal spray 2 sprays (100 mcg total) by Each Nostril route 2 (two) times daily.    sertraline (ZOLOFT) 100 MG tablet sertraline 100 mg tablet   TAKE 1 AND 1/2 TABLETS BY MOUTH EVERY MORNING     Family History    None       Tobacco Use    Smoking status: Some Days     Types: Cigars    Smokeless tobacco: Former     Types: Chew   Substance and Sexual Activity    Alcohol use: Not on file    Drug use: Not on file    Sexual activity: Not on file     Review of Systems   Constitutional:  Positive for activity change, appetite change, chills, fatigue and fever.   HENT:  Positive for congestion, sinus pressure and sinus pain. Negative for trouble swallowing.    Eyes:  Negative for photophobia and visual disturbance.   Respiratory:  Positive for cough. Negative for chest tightness, shortness of breath and wheezing.    Cardiovascular:  Negative for chest pain, palpitations and leg swelling.   Gastrointestinal:  Positive for abdominal pain and nausea. Negative for constipation, diarrhea and vomiting.   Genitourinary:  Negative for dysuria, frequency, hematuria and urgency.   Musculoskeletal:  Negative for arthralgias, back pain and gait problem.   Skin:  Negative for color change and rash.   Neurological:  Positive for headaches. Negative for dizziness,  syncope, weakness, light-headedness and numbness.   Psychiatric/Behavioral:  Negative for agitation and confusion. The patient is not nervous/anxious.      Objective:     Vital Signs (Most Recent):  Temp: (!) 100.7 °F (38.2 °C) (11/17/24 2234)  Pulse: 95 (11/17/24 2234)  Resp: 18 (11/17/24 2234)  BP: 116/70 (11/17/24 2234)  SpO2: 97 % (11/17/24 2234) Vital Signs (24h Range):  Temp:  [98.5 °F (36.9 °C)-102 °F (38.9 °C)] 100.7 °F (38.2 °C)  Pulse:  [] 95  Resp:  [16-19] 18  SpO2:  [94 %-99 %] 97 %  BP: ()/(63-70) 116/70     Weight: 111.1 kg (245 lb)  Body mass index is 35.15 kg/m².     Physical Exam  Vitals and nursing note reviewed.   Constitutional:       General: He is not in acute distress.     Appearance: He is well-developed. He is obese.   HENT:      Head: Normocephalic and atraumatic.      Mouth/Throat:      Pharynx: No oropharyngeal exudate.   Eyes:      General: No scleral icterus.     Conjunctiva/sclera: Conjunctivae normal.   Cardiovascular:      Rate and Rhythm: Normal rate and regular rhythm.      Heart sounds: Normal heart sounds.   Pulmonary:      Effort: Pulmonary effort is normal. No respiratory distress.      Breath sounds: Normal breath sounds. No wheezing.   Abdominal:      General: Bowel sounds are normal. There is no distension.      Palpations: Abdomen is soft.      Tenderness: There is no abdominal tenderness.   Musculoskeletal:         General: No tenderness. Normal range of motion.      Cervical back: Normal range of motion.   Lymphadenopathy:      Cervical: No cervical adenopathy.   Skin:     General: Skin is warm and dry.      Capillary Refill: Capillary refill takes less than 2 seconds.      Findings: No rash.   Neurological:      Mental Status: He is alert and oriented to person, place, and time.      Cranial Nerves: No cranial nerve deficit.      Sensory: No sensory deficit.      Coordination: Coordination normal.   Psychiatric:         Behavior: Behavior normal.          Thought Content: Thought content normal.         Judgment: Judgment normal.                Significant Labs: All pertinent labs within the past 24 hours have been reviewed.  CBC:   Recent Labs   Lab 11/17/24  1749   WBC 13.62*   HGB 13.6*   HCT 41.5        CMP:   Recent Labs   Lab 11/17/24  1749      K 3.8      CO2 26   GLU 97   BUN 12   CREATININE 1.1   CALCIUM 9.0   PROT 7.1   ALBUMIN 2.8*   BILITOT 0.9   ALKPHOS 102   AST 18   ALT 41   ANIONGAP 10       Significant Imaging: I have reviewed all pertinent imaging results/findings within the past 24 hours.  X-Ray Chest PA And Lateral  Narrative: EXAMINATION:  XR CHEST PA AND LATERAL    CLINICAL HISTORY:  fever;    TECHNIQUE:  PA and lateral views of the chest were performed.    COMPARISON:  None    FINDINGS:  Trachea is relatively midline and patent.Bibasilar minimal platelike scarring versus atelectasis.  The lungs are otherwise symmetrically well expanded without consolidation, pleural effusion or pneumothorax.    The cardiac silhouette is normal in size. The pulmonary vasculature and hilar and mediastinal contours are within normal limits.    Osseous structures show minimal degenerative change and otherwise appear intact.  Impression: No consolidation, radiographic evidence of pneumonia or other source of cough/shortness of breath, noting that early/mild viral pneumonia or nonspecific bronchitis may be radiographically occult.    Electronically signed by: Loco Apodaca MD  Date:    11/17/2024  Time:    18:32

## 2024-11-18 NOTE — PLAN OF CARE
Problem: Adult Inpatient Plan of Care  Goal: Plan of Care Review  Outcome: Progressing  Goal: Patient-Specific Goal (Individualized)  Outcome: Progressing  Goal: Absence of Hospital-Acquired Illness or Injury  Outcome: Progressing  Goal: Optimal Comfort and Wellbeing  Outcome: Progressing  Goal: Readiness for Transition of Care  Outcome: Progressing     Problem: Sepsis/Septic Shock  Goal: Optimal Coping  Outcome: Progressing  Goal: Absence of Bleeding  Outcome: Progressing  Goal: Blood Glucose Level Within Targeted Range  Outcome: Progressing  Goal: Absence of Infection Signs and Symptoms  Outcome: Progressing  Goal: Optimal Nutrition Intake  Outcome: Progressing     Problem: Sepsis/Septic Shock  Goal: Optimal Coping  Outcome: Progressing  Goal: Absence of Bleeding  Outcome: Progressing  Goal: Blood Glucose Level Within Targeted Range  Outcome: Progressing  Goal: Absence of Infection Signs and Symptoms  Outcome: Progressing  Goal: Optimal Nutrition Intake  Outcome: Progressing     Problem: Infection  Goal: Absence of Infection Signs and Symptoms  Outcome: Progressing

## 2024-11-18 NOTE — HPI
"Gentry Dowling is a 48 y.o. male with a PMHx of obesity who [place] [facility] [place]. Patient reports fever with associated fatigue, poor appetite, mailase and feeling generally unwell for the past week. He reports mild cough, intermittent right lower quadrant "gas pain" for the past few days. He notices fever usually occurs in the late afternoon which causes difficulty sleeping. He thinks he fell in his sleep and hit his head a few days ago as he woke up with a cut to his head and soreness to the R side of his body. Does not remember the fall and unsure if LOC. Reports intermittent headache which he feels is due to dehydration. Of note, patient reports sinus congestion and pain began about 1 month ago. His mother had similar symptoms about 1 month ago which have since resolved. Denies chest pain, LE swelling, rash, vision changes, N/V, diarrhea. He does mention that he has a family history of leukemia.   "

## 2024-11-18 NOTE — PLAN OF CARE
Problem: Adult Inpatient Plan of Care  Goal: Plan of Care Review  Outcome: Progressing  Goal: Patient-Specific Goal (Individualized)  Outcome: Progressing  Goal: Absence of Hospital-Acquired Illness or Injury  Outcome: Progressing  Goal: Optimal Comfort and Wellbeing  Outcome: Progressing  Goal: Readiness for Transition of Care  Outcome: Progressing     Problem: Sepsis/Septic Shock  Goal: Optimal Coping  Outcome: Progressing  Goal: Absence of Bleeding  Outcome: Progressing  Goal: Blood Glucose Level Within Targeted Range  Outcome: Progressing  Goal: Absence of Infection Signs and Symptoms  Outcome: Progressing  Goal: Optimal Nutrition Intake  Outcome: Progressing     Problem: Infection  Goal: Absence of Infection Signs and Symptoms  Outcome: Progressing    Patient received antibiotics through out the day. His tempeture ranged .0 today. No new complaints.

## 2024-11-18 NOTE — H&P
"  [facility] - Internal Medicine [facility] Medicine  History & Physical    Patient Name: Gentry Dowling  MRN: [id number]  Patient Class: IP- Inpatient  Admission Date: 11/17/2024  Attending Physician: Cornelio Ba*   Primary Care Provider: Kishore Erickson MD         Patient information was obtained from patient, past medical records, and ER records.     Subjective:     Principal Problem:Sepsis without acute organ dysfunction    Chief Complaint:   Chief Complaint   Patient presents with    Fever     Pt reports intermittent fever for about a week. Pt reports highest temp of 101.6. Pt has been taking tylenol.        HPI: Gentry Dowling is a 48 y.o. male with a PMHx of obesity who presents to [facility] [place], [place] for evaluation of fever. Patient reports fever with associated fatigue, poor appetite, mailase and feeling generally unwell for the past week. He reports mild cough, intermittent right lower quadrant "gas pain" for the past few days. He notices fever usually occurs in the late afternoon which causes difficulty sleeping. He thinks he fell in his sleep and hit his head a few days ago as he woke up with a cut to his head and soreness to the R side of his body. Does not remember the fall and unsure if LOC. Reports intermittent headache which he feels is due to dehydration. Of note, patient reports sinus congestion and pain began about 1 month ago. His mother had similar symptoms about 1 month ago which have since resolved. Denies chest pain, LE swelling, rash, vision changes, N/V, diarrhea. He does mention that he has a family history of leukemia.     ED: Temp 102, , BP 93/63, vitals improved s/p 1L IVFs, tylenol and IV vanc and zosyn. Leukocytosis of WBC 13.6. UA non infectious. POC lactic WNL.     History reviewed. No pertinent past medical history.    History reviewed. No pertinent surgical history.    Review of patient's allergies indicates:  No Known Allergies    No current facility-administered " medications on file prior to encounter.     Current Outpatient Medications on File Prior to Encounter   Medication Sig    albuterol (PROVENTIL/VENTOLIN HFA) 90 mcg/actuation inhaler albuterol sulfate HFA 90 mcg/actuation aerosol inhaler   INHALE 1 PUFF BY MOUTH EVERY 4 TO 6 HOURS AS NEEDED FOR SHORTNESS OF BREATH    azelastine (ASTELIN) 137 mcg (0.1 %) nasal spray 1 spray (137 mcg total) by Nasal route 2 (two) times daily.    fluticasone propionate (FLONASE) 50 mcg/actuation nasal spray 2 sprays (100 mcg total) by Each Nostril route 2 (two) times daily.    sertraline (ZOLOFT) 100 MG tablet sertraline 100 mg tablet   TAKE 1 AND 1/2 TABLETS BY MOUTH EVERY MORNING     Family History    None       Tobacco Use    Smoking status: Some Days     Types: Cigars    Smokeless tobacco: Former     Types: Chew   Substance and Sexual Activity    Alcohol use: Not on file    Drug use: Not on file    Sexual activity: Not on file     Review of Systems   Constitutional:  Positive for activity change, appetite change, chills, fatigue and fever.   HENT:  Positive for congestion, sinus pressure and sinus pain. Negative for trouble swallowing.    Eyes:  Negative for photophobia and visual disturbance.   Respiratory:  Positive for cough. Negative for chest tightness, shortness of breath and wheezing.    Cardiovascular:  Negative for chest pain, palpitations and leg swelling.   Gastrointestinal:  Positive for abdominal pain and nausea. Negative for constipation, diarrhea and vomiting.   Genitourinary:  Negative for dysuria, frequency, hematuria and urgency.   Musculoskeletal:  Negative for arthralgias, back pain and gait problem.   Skin:  Negative for color change and rash.   Neurological:  Positive for headaches. Negative for dizziness, syncope, weakness, light-headedness and numbness.   Psychiatric/Behavioral:  Negative for agitation and confusion. The patient is not nervous/anxious.      Objective:     Vital Signs (Most Recent):  Temp: (!)  100.7 °F (38.2 °C) (11/17/24 2234)  Pulse: 95 (11/17/24 2234)  Resp: 18 (11/17/24 2234)  BP: 116/70 (11/17/24 2234)  SpO2: 97 % (11/17/24 2234) Vital Signs (24h Range):  Temp:  [98.5 °F (36.9 °C)-102 °F (38.9 °C)] 100.7 °F (38.2 °C)  Pulse:  [] 95  Resp:  [16-19] 18  SpO2:  [94 %-99 %] 97 %  BP: ()/(63-70) 116/70     Weight: 111.1 kg (245 lb)  Body mass index is 35.15 kg/m².     Physical Exam  Vitals and nursing note reviewed.   Constitutional:       General: He is not in acute distress.     Appearance: He is well-developed. He is obese.   HENT:      Head: Normocephalic and atraumatic.      Mouth/Throat:      Pharynx: No oropharyngeal exudate.   Eyes:      General: No scleral icterus.     Conjunctiva/sclera: Conjunctivae normal.   Cardiovascular:      Rate and Rhythm: Normal rate and regular rhythm.      Heart sounds: Normal heart sounds.   Pulmonary:      Effort: Pulmonary effort is normal. No respiratory distress.      Breath sounds: Normal breath sounds. No wheezing.   Abdominal:      General: Bowel sounds are normal. There is no distension.      Palpations: Abdomen is soft.      Tenderness: There is no abdominal tenderness.   Musculoskeletal:         General: No tenderness. Normal range of motion.      Cervical back: Normal range of motion.   Lymphadenopathy:      Cervical: No cervical adenopathy.   Skin:     General: Skin is warm and dry.      Capillary Refill: Capillary refill takes less than 2 seconds.      Findings: No rash.   Neurological:      Mental Status: He is alert and oriented to person, place, and time.      Cranial Nerves: No cranial nerve deficit.      Sensory: No sensory deficit.      Coordination: Coordination normal.   Psychiatric:         Behavior: Behavior normal.         Thought Content: Thought content normal.         Judgment: Judgment normal.                Significant Labs: All pertinent labs within the past 24 hours have been reviewed.  CBC:   Recent Labs   Lab  11/17/24  1749   WBC 13.62*   HGB 13.6*   HCT 41.5        CMP:   Recent Labs   Lab 11/17/24  1749      K 3.8      CO2 26   GLU 97   BUN 12   CREATININE 1.1   CALCIUM 9.0   PROT 7.1   ALBUMIN 2.8*   BILITOT 0.9   ALKPHOS 102   AST 18   ALT 41   ANIONGAP 10       Significant Imaging: I have reviewed all pertinent imaging results/findings within the past 24 hours.  X-Ray Chest PA And Lateral  Narrative: EXAMINATION:  XR CHEST PA AND LATERAL    CLINICAL HISTORY:  fever;    TECHNIQUE:  PA and lateral views of the chest were performed.    COMPARISON:  None    FINDINGS:  Trachea is relatively midline and patent.Bibasilar minimal platelike scarring versus atelectasis.  The lungs are otherwise symmetrically well expanded without consolidation, pleural effusion or pneumothorax.    The cardiac silhouette is normal in size. The pulmonary vasculature and hilar and mediastinal contours are within normal limits.    Osseous structures show minimal degenerative change and otherwise appear intact.  Impression: No consolidation, radiographic evidence of pneumonia or other source of cough/shortness of breath, noting that early/mild viral pneumonia or nonspecific bronchitis may be radiographically occult.    Electronically signed by: Loco Apodaca MD  Date:    11/17/2024  Time:    18:32      Assessment/Plan:     * Sepsis without acute organ dysfunction  Fever of unknown origen  - febrile with tachycardia and leukocytosis on admit  - lactic WNL  - source unclear- UA, CXR unremarkable  - CT chest/abd/pelvis ordered  - continue BS IV vanc and zosyn  - follow blood cx  - encourage oral hydration    Head trauma  - reports fall which possibly happened in his sleep (?) w/ head trauma, unknown LOC  - will check CTH given headaches, can also eval sinuses  - neuro checks q4h      VTE Risk Mitigation (From admission, onward)           Ordered     enoxaparin injection 40 mg  Daily         11/17/24 2232     IP VTE HIGH RISK PATIENT   Once         11/17/24 2232                                    Aracelis Greenwood PA-C  Department of Hospital Medicine  [facility] - Internal Medicine Telemetry

## 2024-11-18 NOTE — PLAN OF CARE
CHW provided patient with resources:    [place] Supplemental Nutrition Assistance Program (SNAP) by: [place] [facility] ([facility])    Low Income Home Energy Assistance Program (LIHEAP) by: [facility] ([facility])

## 2024-11-18 NOTE — ASSESSMENT & PLAN NOTE
Fever of unknown origin  - febrile with tachycardia and leukocytosis on admit  - lactic WNL  - source unclear- UA, CXR unremarkable  - COVID/flu negative  - CTH negative  - CT chest/abd/pelvis ordered - Posterior right hepatic lobe heterogeneously enhancing/hypoenhancing area. The abrupt diminished enhancement of posterior right portal vein raises suspicion for thrombosis and associated hepatic infarction.  Other underlying etiology including infection/inflammation not excluded.    Symmetric bilateral perinephric fat stranding with unknown chronicity.  Urinalysis can be attempted to rule out infectious process.   Mild hepatosplenomegaly.   - Obtain ultrasound  - continue BS IV vanc and zosyn for now  - follow blood cx - ngtd  - encourage oral hydration

## 2024-11-18 NOTE — ASSESSMENT & PLAN NOTE
Fever of unknown origen  - febrile with tachycardia and leukocytosis on admit  - lactic WNL  - source unclear- UA, CXR unremarkable  - CT chest/abd/pelvis ordered  - continue BS IV vanc and zosyn  - follow blood cx  - encourage oral hydration

## 2024-11-18 NOTE — NURSING
Patient has temp bouncing from 99.1-100. Gave him tylenol.at 3:25. Had Ultrasound done today.MRI with contrast has been ordered.

## 2024-11-18 NOTE — PROGRESS NOTES
"[facility] - Internal Medicine [facility] Medicine  Progress Note    Patient Name: Gentry Dowling  MRN: [id number]  Patient Class: IP- Inpatient   Admission Date: 11/17/2024  Length of Stay: 1 days  Attending Physician: Cornelio Ba*  Primary Care Provider: Kishore Erickson MD        Subjective:     Principal Problem:Sepsis without acute organ dysfunction        HPI:  Gentry Dowling is a 48 y.o. male with a PMHx of obesity who [place] [facility]. Patient reports fever with associated fatigue, poor appetite, mailase and feeling generally unwell for the past week. He reports mild cough, intermittent right lower quadrant "gas pain" for the past few days. He notices fever usually occurs in the late afternoon which causes difficulty sleeping. He thinks he fell in his sleep and hit his head a few days ago as he woke up with a cut to his head and soreness to the R side of his body. Does not remember the fall and unsure if LOC. Reports intermittent headache which he feels is due to dehydration. Of note, patient reports sinus congestion and pain began about 1 month ago. His mother had similar symptoms about 1 month ago which have since resolved. Denies chest pain, LE swelling, rash, vision changes, N/V, diarrhea. He does mention that he has a family history of leukemia.     ED: Temp 102, , BP 93/63, vitals improved s/p 1L IVFs, tylenol and IV vanc and zosyn. Leukocytosis of WBC 13.6. UA non infectious. POC lactic WNL.     Overview/Hospital Course:  No notes on file    Interval History: Admitted to hospital medicine. Pt states he feels a little better than yesterday  Blood cultures no growth    Review of Systems  Objective:     Vital Signs (Most Recent):  Temp: 99.2 °F (37.3 °C) (11/18/24 0747)  Pulse: 93 (11/18/24 0747)  Resp: 18 (11/18/24 0747)  BP: 106/75 (11/18/24 0747)  SpO2: 95 % (11/18/24 0747) Vital Signs (24h Range):  Temp:  [98.3 °F (36.8 °C)-102 °F (38.9 °C)] 99.2 °F " (37.3 °C)  Pulse:  [] 93  Resp:  [16-19] 18  SpO2:  [94 %-99 %] 95 %  BP: ()/(63-75) 106/75     Weight: 111.1 kg (245 lb)  Body mass index is 35.15 kg/m².    Intake/Output Summary (Last 24 hours) at 11/18/2024 1050  Last data filed at 11/18/2024 0916  Gross per 24 hour   Intake 2320 ml   Output --   Net 2320 ml         Physical Exam  Vitals and nursing note reviewed.   Constitutional:       General: He is not in acute distress.     Appearance: He is well-developed. He is obese.   HENT:      Head: Normocephalic and atraumatic.      Mouth/Throat:      Pharynx: No oropharyngeal exudate.   Eyes:      General: No scleral icterus.     Conjunctiva/sclera: Conjunctivae normal.   Cardiovascular:      Rate and Rhythm: Normal rate and regular rhythm.      Heart sounds: Normal heart sounds.   Pulmonary:      Effort: Pulmonary effort is normal. No respiratory distress.      Breath sounds: Normal breath sounds. No wheezing.   Abdominal:      General: Bowel sounds are normal. There is no distension.      Palpations: Abdomen is soft.      Tenderness: There is no abdominal tenderness.   Musculoskeletal:         General: No tenderness. Normal range of motion.      Cervical back: Normal range of motion.   Lymphadenopathy:      Cervical: No cervical adenopathy.   Skin:     General: Skin is warm and dry.      Capillary Refill: Capillary refill takes less than 2 seconds.      Findings: No rash.   Neurological:      Mental Status: He is alert and oriented to person, place, and time.      Cranial Nerves: No cranial nerve deficit.      Sensory: No sensory deficit.      Coordination: Coordination normal.   Psychiatric:         Behavior: Behavior normal.         Thought Content: Thought content normal.         Judgment: Judgment normal.             Significant Labs: All pertinent labs within the past 24 hours have been reviewed.    Significant Imaging: I have reviewed all pertinent imaging results/findings within the past 24  hours.    Assessment/Plan:      * Sepsis without acute organ dysfunction  Fever of unknown origin  - febrile with tachycardia and leukocytosis on admit  - lactic WNL  - source unclear- UA, CXR unremarkable  - COVID/flu negative  - CTH negative  - CT chest/abd/pelvis ordered - Posterior right hepatic lobe heterogeneously enhancing/hypoenhancing area. The abrupt diminished enhancement of posterior right portal vein raises suspicion for thrombosis and associated hepatic infarction.  Other underlying etiology including infection/inflammation not excluded.    Symmetric bilateral perinephric fat stranding with unknown chronicity.  Urinalysis can be attempted to rule out infectious process.   Mild hepatosplenomegaly.   - Obtain ultrasound  - continue BS IV vanc and zosyn for now  - follow blood cx - ngtd  - encourage oral hydration    Head trauma  - reports fall which possibly happened in his sleep (?) w/ head trauma, unknown LOC  - will check CTH given headaches, can also eval sinuses - negative  - neuro checks q4h      VTE Risk Mitigation (From admission, onward)           Ordered     enoxaparin injection 40 mg  Daily         11/17/24 2232     IP VTE HIGH RISK PATIENT  Once         11/17/24 2232                    Discharge Planning   PRESTON:      Code Status: Full Code   Is the patient medically ready for discharge?:     Reason for patient still in hospital (select all that apply): Patient trending condition and Treatment           Cornelio Ba MD  Department of Hospital Medicine   [facility] - Internal Medicine Telemetry

## 2024-11-18 NOTE — ASSESSMENT & PLAN NOTE
- reports fall which possibly happened in his sleep (?) w/ head trauma, unknown LOC  - will check CTH given headaches, can also eval sinuses  - neuro checks q4h

## 2024-11-18 NOTE — PLAN OF CARE
[facility] - Internal Medicine Telemetry  Initial Discharge Assessment       Primary Care Provider: Kishore Erickson MD    Admission Diagnosis: Fever of unknown origin [R50.9]  Sepsis, due to unspecified organism, unspecified whether acute organ dysfunction present [A41.9]    Admission Date: 11/17/2024  Expected Discharge Date:     Transition of Care Barriers: (P) None    Payor: MEDICAID / Plan: [employer]ETINMAN [facility] / Product Type: Managed Medicaid /     Extended Emergency Contact Information  Primary Emergency Contact: HUSSEIN HARPER  Mobile Phone: [phone]  Relation: Mother   needed? No    Discharge Plan A: (P) Home with family  Discharge Plan B: (P) Home      [id number] - [place] - [address] AT [facility]  [address]  [place] [address]  Phone: [phone] Fax: [phone]      Initial Assessment (most recent)       Adult Discharge Assessment - 11/18/24 1150          Discharge Assessment    Assessment Type Discharge Planning Assessment     Confirmed/corrected address, phone number and insurance Yes     Confirmed Demographics Correct on Facesheet (P)      Source of Information patient;family (P)      Communicated PRESTON with patient/caregiver Yes (P)      People in Home alone (P)      Do you expect to return to your current living situation? Yes (P)      Do you have help at home or someone to help you manage your care at home? No (P)      Prior to hospitilization cognitive status: Alert/Oriented (P)      Current cognitive status: Alert/Oriented (P)      Walking or Climbing Stairs Difficulty no (P)      Dressing/Bathing Difficulty no (P)      Home Accessibility wheelchair accessible (P)      Home Layout Able to live on 1st floor (P)      Equipment Currently Used at Home none (P)      Readmission within 30 days? No (P)      Patient currently being followed by outpatient case management? No (P)      Do you currently have  service(s) that help you manage your care at home? No (P)      Do you take prescription medications? Yes (P)      Do you have prescription coverage? Yes (P)      Coverage MEDICAID - AETNA [facility] - (P)      Do you have any problems affording any of your prescribed medications? No (P)      Is the patient taking medications as prescribed? yes (P)      Who is going to help you get home at discharge? HUSSEIN HARPER (Mother)  [phone] (P)      How do you get to doctors appointments? car, drives self;family or friend will provide (P)      Are you on dialysis? No (P)      Do you take coumadin? No (P)      Discharge Plan A Home with family (P)      Discharge Plan B Home (P)      DME Needed Upon Discharge  none (P)      Discharge Plan discussed with: Patient;Parent(s) (P)      Name(s) and Number(s) HUSSEIN HARPER (Mother) [phone] (P)      Transition of Care Barriers None (P)         Financial Resource Strain    How hard is it for you to pay for the very basics like food, housing, medical care, and heating? Somewhat hard (P)         Housing Stability    In the last 12 months, was there a time when you were not able to pay the mortgage or rent on time? Yes (P)      At any time in the past 12 months, were you homeless or living in a shelter (including now)? No (P)         Transportation Needs    Has the lack of transportation kept you from medical appointments, meetings, work or from getting things needed for daily living? No (P)         Food Insecurity    Within the past 12 months, you worried that your food would run out before you got the money to buy more. Sometimes true (P)      Within the past 12 months, the food you bought just didn't last and you didn't have money to get more. Sometimes true (P)         Stress    Do you feel stress - tense, restless, nervous, or anxious, or unable to sleep at night because your mind is troubled all the time - these days? To some extent (P)         Social  Isolation    How often do you feel lonely or isolated from those around you?  Never (P)         Alcohol Use    Q1: How often do you have a drink containing alcohol? Monthly or less (P)      Q2: How many drinks containing alcohol do you have on a typical day when you are drinking? 1 or 2 (P)      Q3: How often do you have six or more drinks on one occasion? Never (P)         Utilities    In the past 12 months has the electric, gas, oil, or water company threatened to shut off services in your home? Yes (P)         Health Literacy    How often do you need to have someone help you when you read instructions, pamphlets, or other written material from your doctor or pharmacy? Never (P)                  Discharge Plan A and Plan B have been determined by review of patient's clinical status, future medical and therapeutic needs, and coverage/benefits for post-acute care in coordination with multidisciplinary team members.                 LIZ Tillman, LMSW  [facility]  Case Management  Ext. [phone]

## 2024-11-18 NOTE — SUBJECTIVE & OBJECTIVE
Interval History: Admitted to hospital medicine. Pt states he feels a little better than yesterday  Blood cultures no growth    Review of Systems  Objective:     Vital Signs (Most Recent):  Temp: 99.2 °F (37.3 °C) (11/18/24 0747)  Pulse: 93 (11/18/24 0747)  Resp: 18 (11/18/24 0747)  BP: 106/75 (11/18/24 0747)  SpO2: 95 % (11/18/24 0747) Vital Signs (24h Range):  Temp:  [98.3 °F (36.8 °C)-102 °F (38.9 °C)] 99.2 °F (37.3 °C)  Pulse:  [] 93  Resp:  [16-19] 18  SpO2:  [94 %-99 %] 95 %  BP: ()/(63-75) 106/75     Weight: 111.1 kg (245 lb)  Body mass index is 35.15 kg/m².    Intake/Output Summary (Last 24 hours) at 11/18/2024 1050  Last data filed at 11/18/2024 0916  Gross per 24 hour   Intake 2320 ml   Output --   Net 2320 ml         Physical Exam  Vitals and nursing note reviewed.   Constitutional:       General: He is not in acute distress.     Appearance: He is well-developed. He is obese.   HENT:      Head: Normocephalic and atraumatic.      Mouth/Throat:      Pharynx: No oropharyngeal exudate.   Eyes:      General: No scleral icterus.     Conjunctiva/sclera: Conjunctivae normal.   Cardiovascular:      Rate and Rhythm: Normal rate and regular rhythm.      Heart sounds: Normal heart sounds.   Pulmonary:      Effort: Pulmonary effort is normal. No respiratory distress.      Breath sounds: Normal breath sounds. No wheezing.   Abdominal:      General: Bowel sounds are normal. There is no distension.      Palpations: Abdomen is soft.      Tenderness: There is no abdominal tenderness.   Musculoskeletal:         General: No tenderness. Normal range of motion.      Cervical back: Normal range of motion.   Lymphadenopathy:      Cervical: No cervical adenopathy.   Skin:     General: Skin is warm and dry.      Capillary Refill: Capillary refill takes less than 2 seconds.      Findings: No rash.   Neurological:      Mental Status: He is alert and oriented to person, place, and time.      Cranial Nerves: No cranial  nerve deficit.      Sensory: No sensory deficit.      Coordination: Coordination normal.   Psychiatric:         Behavior: Behavior normal.         Thought Content: Thought content normal.         Judgment: Judgment normal.             Significant Labs: All pertinent labs within the past 24 hours have been reviewed.    Significant Imaging: I have reviewed all pertinent imaging results/findings within the past 24 hours.

## 2024-11-18 NOTE — PROGRESS NOTES
"Pharmacokinetic Initial Assessment: IV Vancomycin    Assessment/Plan:    Initiate intravenous vancomycin with loading dose of 2000 mg once followed by a maintenance dose of vancomycin 1750 mg IV every 12 hours  Desired empiric serum trough concentration is 15 to 20 mcg/mL  Draw vancomycin trough level 60 min prior to fourth dose on 11/19/24 at approximately 0700  Pharmacy will continue to follow and monitor vancomycin.      Please contact pharmacy at extension [phone] with any questions regarding this assessment.     Thank you for the consult,   Shelby De Los Santos       Patient brief summary:  Gentry Dowling is a 48 y.o. male initiated on antimicrobial therapy with IV Vancomycin for treatment of suspected sepsis    Drug Allergies:   Review of patient's allergies indicates:  No Known Allergies    Actual Body Weight:   111.1 kg    Renal Function:   Estimated Creatinine Clearance: 102.5 mL/min (based on SCr of 1.1 mg/dL).,     Dialysis Method (if applicable):  N/A    CBC (last 72 hours):  Recent Labs   Lab Result Units 11/17/24  1749   WBC K/uL 13.62*   Hemoglobin g/dL 13.6*   Hematocrit % 41.5   Platelets K/uL 366   Gran % % 85.3*   Lymph % % 8.2*   Mono % % 5.4   Eosinophil % % 0.3   Basophil % % 0.4   Differential Method  Automated       Metabolic Panel (last 72 hours):  Recent Labs   Lab Result Units 11/17/24  1735 11/17/24  1749   Sodium mmol/L  --  138   Potassium mmol/L  --  3.8   Chloride mmol/L  --  102   CO2 mmol/L  --  26   Glucose mg/dL  --  97   Glucose, UA  Negative  --    BUN mg/dL  --  12   Creatinine mg/dL  --  1.1   Albumin g/dL  --  2.8*   Total Bilirubin mg/dL  --  0.9   Alkaline Phosphatase U/L  --  102   AST U/L  --  18   ALT U/L  --  41       Drug levels (last 3 results):  No results for input(s): "VANCOMYCINRA", "VANCORANDOM", "VANCOMYCINPE", "VANCOPEAK", "VANCOMYCINTR", "VANCOTROUGH" in the last 72 hours.    Microbiologic Results:  Microbiology Results (last 7 days)       Procedure Component Value " Units Date/Time    Blood culture #1 **CANNOT BE ORDERED STAT** [[id number]] Collected: 11/17/24 1749    Order Status: Sent Specimen: Blood from Peripheral, Upper Arm, Right Updated: 11/17/24 1806    Blood culture #2 **CANNOT BE ORDERED STAT** [[id number]] Collected: 11/17/24 1749    Order Status: Sent Specimen: Blood from Peripheral, Upper Arm, Right Updated: 11/17/24 1806    Influenza A & B by Molecular [[id number]] Collected: 11/17/24 1721    Order Status: Completed Specimen: Nasopharyngeal Swab Updated: 11/17/24 1756     Influenza A, Molecular Negative     Influenza B, Molecular Negative     Flu A & B Source Nasal swab

## 2024-11-18 NOTE — PROGRESS NOTES
Pharmacokinetic Assessment Follow Up: IV Vancomycin    Vancomycin serum concentration assessment(s):    The random level was drawn correctly and can be used to guide therapy at this time. The measurement is within the desired definitive target range of 10 to 20 mcg/mL.    Vancomycin Regimen Plan:    Noted patients scr increased this morning and there was concern for an LOGAN so patients scheduled vancomycin regimen was d/c and a ~ 12 hour random was obtained  Plan to pulse dose for now given LOGAN - will give 750 mg IV vancomycin now and follow up with another random level with AM labs on 11/19  Will liberate vancomycin goal to 10-20 mcg/mL  The trough goal may be adjusted based on the patients clinical course and culture results     Drug levels (last 3 results):  Recent Labs   Lab Result Units 11/18/24  0822   Vancomycin, Random ug/mL 17.0     Pharmacy will continue to follow and monitor vancomycin.    Please contact pharmacy at extension [phone] for questions regarding this assessment.    Thank you for the consult,   Nawaf Tompkins, PharmD, [facility]      Patient brief summary:  Gentry Dowling is a 48 y.o. male initiated on antimicrobial therapy with IV Vancomycin for treatment of sepsis    Drug Allergies:   Review of patient's allergies indicates:  No Known Allergies    Actual Body Weight:   111.1 kg    Renal Function:   Estimated Creatinine Clearance: 75.1 mL/min (A) (based on SCr of 1.5 mg/dL (H)).,     Dialysis Method (if applicable):  N/A    CBC (last 72 hours):  Recent Labs   Lab Result Units 11/17/24 1749 11/18/24  0212   WBC K/uL 13.62* 14.59*   Hemoglobin g/dL 13.6* 13.3*   Hemoglobin A1C %  --  5.0   Hematocrit % 41.5 40.4   Platelets K/uL 366 354   Gran % % 85.3* 81.7*   Lymph % % 8.2* 10.5*   Mono % % 5.4 6.4   Eosinophil % % 0.3 0.5   Basophil % % 0.4 0.4   Differential Method  Automated Automated       Metabolic Panel (last 72 hours):  Recent Labs   Lab Result Units 11/17/24  1735 11/17/24 1749  11/18/24  0212   Sodium mmol/L  --  138 141   Potassium mmol/L  --  3.8 3.7   Chloride mmol/L  --  102 104   CO2 mmol/L  --  26 24   Glucose mg/dL  --  97 92   Glucose, UA  Negative  --   --    BUN mg/dL  --  12 15   Creatinine mg/dL  --  1.1 1.5*   Albumin g/dL  --  2.8*  --    Total Bilirubin mg/dL  --  0.9  --    Alkaline Phosphatase U/L  --  102  --    AST U/L  --  18  --    ALT U/L  --  41  --    Magnesium mg/dL  --   --  2.3   Phosphorus mg/dL  --   --  3.6       Vancomycin Administrations:  vancomycin given in the last 96 hours                     vancomycin 2 g in dextrose 5 % 500 mL IVPB (mg) 2,000 mg New Bag 11/17/24 1957                    Microbiologic Results:  Microbiology Results (last 7 days)       Procedure Component Value Units Date/Time    Blood culture #1 **CANNOT BE ORDERED STAT** [[id number]] Collected: 11/17/24 1749    Order Status: Completed Specimen: Blood from Peripheral, Upper Arm, Right Updated: 11/18/24 0115     Blood Culture, Routine No Growth to date    Blood culture #2 **CANNOT BE ORDERED STAT** [[id number]] Collected: 11/17/24 1749    Order Status: Completed Specimen: Blood from Peripheral, Upper Arm, Right Updated: 11/18/24 0115     Blood Culture, Routine No Growth to date    Influenza A & B by Molecular [[id number]] Collected: 11/17/24 1721    Order Status: Completed Specimen: Nasopharyngeal Swab Updated: 11/17/24 1756     Influenza A, Molecular Negative     Influenza B, Molecular Negative     Flu A & B Source Nasal swab

## 2024-11-19 PROBLEM — N17.9 AKI (ACUTE KIDNEY INJURY): Status: ACTIVE | Noted: 2024-11-19

## 2024-11-19 LAB
ANION GAP SERPL CALC-SCNC: 10 MMOL/L (ref 8–16)
BASOPHILS # BLD AUTO: 0.04 K/UL (ref 0–0.2)
BASOPHILS NFR BLD: 0.3 % (ref 0–1.9)
BUN SERPL-MCNC: 19 MG/DL (ref 6–20)
CALCIUM SERPL-MCNC: 8.5 MG/DL (ref 8.7–10.5)
CHLORIDE SERPL-SCNC: 106 MMOL/L (ref 95–110)
CO2 SERPL-SCNC: 24 MMOL/L (ref 23–29)
CREAT SERPL-MCNC: 2.7 MG/DL (ref 0.5–1.4)
CREAT UR-MCNC: 100 MG/DL (ref 23–375)
CREAT UR-MCNC: 100 MG/DL (ref 23–375)
DIFFERENTIAL METHOD BLD: ABNORMAL
EOSINOPHIL # BLD AUTO: 0.1 K/UL (ref 0–0.5)
EOSINOPHIL NFR BLD: 0.9 % (ref 0–8)
ERYTHROCYTE [DISTWIDTH] IN BLOOD BY AUTOMATED COUNT: 13.2 % (ref 11.5–14.5)
EST. GFR  (NO RACE VARIABLE): 28.2 ML/MIN/1.73 M^2
GLUCOSE SERPL-MCNC: 112 MG/DL (ref 70–110)
HCT VFR BLD AUTO: 37.4 % (ref 40–54)
HGB BLD-MCNC: 12.1 G/DL (ref 14–18)
IMM GRANULOCYTES # BLD AUTO: 0.06 K/UL (ref 0–0.04)
IMM GRANULOCYTES NFR BLD AUTO: 0.5 % (ref 0–0.5)
LYMPHOCYTES # BLD AUTO: 1.1 K/UL (ref 1–4.8)
LYMPHOCYTES NFR BLD: 9 % (ref 18–48)
MAGNESIUM SERPL-MCNC: 2.3 MG/DL (ref 1.6–2.6)
MCH RBC QN AUTO: 28.5 PG (ref 27–31)
MCHC RBC AUTO-ENTMCNC: 32.4 G/DL (ref 32–36)
MCV RBC AUTO: 88 FL (ref 82–98)
MONOCYTES # BLD AUTO: 0.7 K/UL (ref 0.3–1)
MONOCYTES NFR BLD: 5.7 % (ref 4–15)
NEUTROPHILS # BLD AUTO: 10.1 K/UL (ref 1.8–7.7)
NEUTROPHILS NFR BLD: 83.6 % (ref 38–73)
NRBC BLD-RTO: 0 /100 WBC
PHOSPHATE SERPL-MCNC: 4 MG/DL (ref 2.7–4.5)
PLATELET # BLD AUTO: 300 K/UL (ref 150–450)
PMV BLD AUTO: 9.3 FL (ref 9.2–12.9)
POTASSIUM SERPL-SCNC: 3.5 MMOL/L (ref 3.5–5.1)
PROT UR-MCNC: 29 MG/DL (ref 0–15)
PROT/CREAT UR: 0.29 MG/G{CREAT} (ref 0–0.2)
RBC # BLD AUTO: 4.25 M/UL (ref 4.6–6.2)
SODIUM SERPL-SCNC: 140 MMOL/L (ref 136–145)
SODIUM UR-SCNC: 37 MMOL/L (ref 20–250)
VANCOMYCIN SERPL-MCNC: 26.3 UG/ML
WBC # BLD AUTO: 12.07 K/UL (ref 3.9–12.7)

## 2024-11-19 PROCEDURE — 83735 ASSAY OF MAGNESIUM: CPT | Performed by: PHYSICIAN ASSISTANT

## 2024-11-19 PROCEDURE — 84100 ASSAY OF PHOSPHORUS: CPT | Performed by: PHYSICIAN ASSISTANT

## 2024-11-19 PROCEDURE — 25000003 PHARM REV CODE 250: Performed by: FAMILY MEDICINE

## 2024-11-19 PROCEDURE — 63600175 PHARM REV CODE 636 W HCPCS: Performed by: PHYSICIAN ASSISTANT

## 2024-11-19 PROCEDURE — 80048 BASIC METABOLIC PNL TOTAL CA: CPT | Performed by: PHYSICIAN ASSISTANT

## 2024-11-19 PROCEDURE — 85025 COMPLETE CBC W/AUTO DIFF WBC: CPT | Performed by: PHYSICIAN ASSISTANT

## 2024-11-19 PROCEDURE — 21400001 HC TELEMETRY ROOM

## 2024-11-19 PROCEDURE — 84300 ASSAY OF URINE SODIUM: CPT | Performed by: STUDENT IN AN ORGANIZED HEALTH CARE EDUCATION/TRAINING PROGRAM

## 2024-11-19 PROCEDURE — 25000003 PHARM REV CODE 250

## 2024-11-19 PROCEDURE — 25000003 PHARM REV CODE 250: Performed by: PHYSICIAN ASSISTANT

## 2024-11-19 PROCEDURE — 25000242 PHARM REV CODE 250 ALT 637 W/ HCPCS: Performed by: STUDENT IN AN ORGANIZED HEALTH CARE EDUCATION/TRAINING PROGRAM

## 2024-11-19 PROCEDURE — 82570 ASSAY OF URINE CREATININE: CPT | Performed by: STUDENT IN AN ORGANIZED HEALTH CARE EDUCATION/TRAINING PROGRAM

## 2024-11-19 PROCEDURE — 80202 ASSAY OF VANCOMYCIN: CPT | Performed by: STUDENT IN AN ORGANIZED HEALTH CARE EDUCATION/TRAINING PROGRAM

## 2024-11-19 PROCEDURE — 25000003 PHARM REV CODE 250: Performed by: STUDENT IN AN ORGANIZED HEALTH CARE EDUCATION/TRAINING PROGRAM

## 2024-11-19 RX ORDER — SODIUM CHLORIDE 9 MG/ML
INJECTION, SOLUTION INTRAVENOUS CONTINUOUS
Status: ACTIVE | OUTPATIENT
Start: 2024-11-19 | End: 2024-11-19

## 2024-11-19 RX ADMIN — Medication 300 ML: at 03:11

## 2024-11-19 RX ADMIN — ACETAMINOPHEN 650 MG: 325 TABLET ORAL at 08:11

## 2024-11-19 RX ADMIN — SODIUM CHLORIDE: 9 INJECTION, SOLUTION INTRAVENOUS at 10:11

## 2024-11-19 RX ADMIN — PIPERACILLIN SODIUM AND TAZOBACTAM SODIUM 4.5 G: 4; .5 INJECTION, POWDER, FOR SOLUTION INTRAVENOUS at 09:11

## 2024-11-19 RX ADMIN — FLUTICASONE PROPIONATE 100 MCG: 50 SPRAY, METERED NASAL at 10:11

## 2024-11-19 RX ADMIN — Medication 300 ML: at 07:11

## 2024-11-19 RX ADMIN — PIPERACILLIN SODIUM AND TAZOBACTAM SODIUM 4.5 G: 4; .5 INJECTION, POWDER, FOR SOLUTION INTRAVENOUS at 11:11

## 2024-11-19 RX ADMIN — GUAIFENESIN AND DEXTROMETHORPHAN HYDROBROMIDE 1 TABLET: 600; 30 TABLET, EXTENDED RELEASE ORAL at 10:11

## 2024-11-19 RX ADMIN — PIPERACILLIN SODIUM AND TAZOBACTAM SODIUM 4.5 G: 4; .5 INJECTION, POWDER, FOR SOLUTION INTRAVENOUS at 04:11

## 2024-11-19 RX ADMIN — Medication 300 ML: at 11:11

## 2024-11-19 RX ADMIN — GUAIFENESIN AND DEXTROMETHORPHAN HYDROBROMIDE 1 TABLET: 600; 30 TABLET, EXTENDED RELEASE ORAL at 08:11

## 2024-11-19 NOTE — ASSESSMENT & PLAN NOTE
Fever of unknown origin  - febrile with tachycardia and leukocytosis on admit  - lactic WNL  - source unclear- UA, CXR unremarkable  - COVID/flu negative  - CTH negative  - CT chest/abd/pelvis ordered - Posterior right hepatic lobe heterogeneously enhancing/hypoenhancing area. The abrupt diminished enhancement of posterior right portal vein raises suspicion for thrombosis and associated hepatic infarction.  Other underlying etiology including infection/inflammation not excluded.    Symmetric bilateral perinephric fat stranding with unknown chronicity.  Urinalysis can be attempted to rule out infectious process.   Mild hepatosplenomegaly.   - Obtain ultrasound - negative. MRI pending  - continue BS IV vanc and zosyn for now  - follow blood cx - ngtd  - encourage oral hydration

## 2024-11-19 NOTE — SUBJECTIVE & OBJECTIVE
Interval History: No acute events. Pt reports fatigue. Has now been afebrile.   Leukocytosis improved  MRI ordered    Review of Systems  Objective:     Vital Signs (Most Recent):  Temp: 97.7 °F (36.5 °C) (11/19/24 1121)  Pulse: 97 (11/19/24 1121)  Resp: 18 (11/19/24 1121)  BP: 131/80 (11/19/24 1121)  SpO2: 97 % (11/19/24 1121) Vital Signs (24h Range):  Temp:  [97.7 °F (36.5 °C)-100 °F (37.8 °C)] 97.7 °F (36.5 °C)  Pulse:  [] 97  Resp:  [17-24] 18  SpO2:  [95 %-98 %] 97 %  BP: (109-131)/(65-81) 131/80     Weight: 111.1 kg (245 lb)  Body mass index is 35.15 kg/m².    Intake/Output Summary (Last 24 hours) at 11/19/2024 1152  Last data filed at 11/19/2024 0220  Gross per 24 hour   Intake 885.11 ml   Output --   Net 885.11 ml         Physical Exam  Vitals and nursing note reviewed.   Constitutional:       General: He is not in acute distress.     Appearance: He is well-developed. He is obese.   HENT:      Head: Normocephalic and atraumatic.      Mouth/Throat:      Pharynx: No oropharyngeal exudate.   Eyes:      General: No scleral icterus.     Conjunctiva/sclera: Conjunctivae normal.   Cardiovascular:      Rate and Rhythm: Normal rate and regular rhythm.      Heart sounds: Normal heart sounds.   Pulmonary:      Effort: Pulmonary effort is normal. No respiratory distress.      Breath sounds: Normal breath sounds. No wheezing.   Abdominal:      General: Bowel sounds are normal. There is no distension.      Palpations: Abdomen is soft.      Tenderness: There is no abdominal tenderness.   Musculoskeletal:         General: No tenderness. Normal range of motion.      Cervical back: Normal range of motion.   Lymphadenopathy:      Cervical: No cervical adenopathy.   Skin:     General: Skin is warm and dry.      Capillary Refill: Capillary refill takes less than 2 seconds.      Findings: No rash.   Neurological:      Mental Status: He is alert and oriented to person, place, and time.      Cranial Nerves: No cranial nerve  deficit.      Sensory: No sensory deficit.      Coordination: Coordination normal.   Psychiatric:         Behavior: Behavior normal.         Thought Content: Thought content normal.         Judgment: Judgment normal.             Significant Labs: All pertinent labs within the past 24 hours have been reviewed.    Significant Imaging: I have reviewed all pertinent imaging results/findings within the past 24 hours.

## 2024-11-19 NOTE — ASSESSMENT & PLAN NOTE
LOGAN is likely due to pre-renal azotemia due to dehydration and ?contrast . Baseline creatinine is  unknown . Most recent creatinine and eGFR are listed below.  Recent Labs     11/17/24  1749 11/18/24  0212 11/19/24  0358   CREATININE 1.1 1.5* 2.7*   EGFRNORACEVR >60.0 57.1* 28.2*      Plan  - LOGAN is worsening. Will adjust treatment as follows: IVF  - Avoid nephrotoxins and renally dose meds for GFR listed above  - Monitor urine output, serial BMP, and adjust therapy as needed

## 2024-11-19 NOTE — HOSPITAL COURSE
ED: Temp 102, , BP 93/63, vitals improved s/p 1L IVFs, tylenol and IV vanc and zosyn. Leukocytosis of WBC 13.6. UA non infectious. POC lactic WNL. CT c/a/p with Posterior right hepatic lobe heterogeneously enhancing/hypoenhancing area.  The abrupt diminished enhancement of posterior right portal vein raises suspicion for thrombosis and associated hepatic infarction.  Other underlying etiology including infection/inflammation not excluded.   Ultrasound negative for portal vein thrombosis. MRI ordered. Pt developed LOGAN which improved with IVF and oral intake.  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 Given high mortality associated with this condition, close monitoring for bowel ischemia and other complications necessary and discussed with patient.  However, appropriate treatment ongoing, including treatment with Zosyn.  Given this finding being suggestive of intra-abdominal infection and associated symptoms being present, stool samples obtained but negative. Infectious Diseases consulted and recommend IV antibiotics until 12/29.

## 2024-11-19 NOTE — PLAN OF CARE
AAOx4; POC reviewed & verbalizes understanding.  Admit DX: Malaise/fever/gen weakness  Afebrile. No acute events on shift. No deficits noted  IV access & IVF: PIV  infusing NS @ 100  BM: 11/18/24  : WNL, clear yellow urine  Appetite: adequate  Urine specimen collected & sent to lab  MRI on hold d/t crt elevated  Bed alarm set; fall precautions maintained.   Bed locked in lowest position, side rails up x2, call light within reach, environment clear. Encouraged pt to call nurse with any concerns.  Safety measures maintained

## 2024-11-19 NOTE — PROGRESS NOTES
"[facility] - Internal Medicine [facility]  Progress Note    Patient Name: Gentry Dowling  MRN: [id number]  Patient Class: IP- Inpatient   Admission Date: 11/17/2024  Length of Stay: 2 days  Attending Physician: Cornelio Ba*  Primary Care Provider: Kishore Erickson MD        Subjective:     Principal Problem:Sepsis without acute organ dysfunction        HPI:  Gentry Dowling is a 48 y.o. male with a PMHx of obesity who presents to [facility] for evaluation of fever. Patient reports fever with associated fatigue, poor appetite, mailase and feeling generally unwell for the past week. He reports mild cough, intermittent right lower quadrant "gas pain" for the past few days. He notices fever usually occurs in the late afternoon which causes difficulty sleeping. He thinks he fell in his sleep and hit his head a few days ago as he woke up with a cut to his head and soreness to the R side of his body. Does not remember the fall and unsure if LOC. Reports intermittent headache which he feels is due to dehydration. Of note, patient reports sinus congestion and pain began about 1 month ago. His mother had similar symptoms about 1 month ago which have since resolved. Denies chest pain, LE swelling, rash, vision changes, N/V, diarrhea. He does mention that he has a family history of leukemia.     Overview/Hospital Course:  ED: Temp 102, , BP 93/63, vitals improved s/p 1L IVFs, tylenol and IV vanc and zosyn. Leukocytosis of WBC 13.6. UA non infectious. POC lactic WNL. CT c/a/p with Posterior right hepatic lobe heterogeneously enhancing/hypoenhancing area.  The abrupt diminished enhancement of posterior right portal vein raises suspicion for thrombosis and associated hepatic infarction.  Other underlying etiology including infection/inflammation not excluded.   Ultrasound negative for portal vein thrombosis.     Interval History: No acute events. Pt reports fatigue. Has now been afebrile.   Leukocytosis " improved  MRI ordered    Review of Systems  Objective:     Vital Signs (Most Recent):  Temp: 97.7 °F (36.5 °C) (11/19/24 1121)  Pulse: 97 (11/19/24 1121)  Resp: 18 (11/19/24 1121)  BP: 131/80 (11/19/24 1121)  SpO2: 97 % (11/19/24 1121) Vital Signs (24h Range):  Temp:  [97.7 °F (36.5 °C)-100 °F (37.8 °C)] 97.7 °F (36.5 °C)  Pulse:  [] 97  Resp:  [17-24] 18  SpO2:  [95 %-98 %] 97 %  BP: (109-131)/(65-81) 131/80     Weight: 111.1 kg (245 lb)  Body mass index is 35.15 kg/m².    Intake/Output Summary (Last 24 hours) at 11/19/2024 1152  Last data filed at 11/19/2024 0220  Gross per 24 hour   Intake 885.11 ml   Output --   Net 885.11 ml         Physical Exam  Vitals and nursing note reviewed.   Constitutional:       General: He is not in acute distress.     Appearance: He is well-developed. He is obese.   HENT:      Head: Normocephalic and atraumatic.      Mouth/Throat:      Pharynx: No oropharyngeal exudate.   Eyes:      General: No scleral icterus.     Conjunctiva/sclera: Conjunctivae normal.   Cardiovascular:      Rate and Rhythm: Normal rate and regular rhythm.      Heart sounds: Normal heart sounds.   Pulmonary:      Effort: Pulmonary effort is normal. No respiratory distress.      Breath sounds: Normal breath sounds. No wheezing.   Abdominal:      General: Bowel sounds are normal. There is no distension.      Palpations: Abdomen is soft.      Tenderness: There is no abdominal tenderness.   Musculoskeletal:         General: No tenderness. Normal range of motion.      Cervical back: Normal range of motion.   Lymphadenopathy:      Cervical: No cervical adenopathy.   Skin:     General: Skin is warm and dry.      Capillary Refill: Capillary refill takes less than 2 seconds.      Findings: No rash.   Neurological:      Mental Status: He is alert and oriented to person, place, and time.      Cranial Nerves: No cranial nerve deficit.      Sensory: No sensory deficit.      Coordination: Coordination normal.    Psychiatric:         Behavior: Behavior normal.         Thought Content: Thought content normal.         Judgment: Judgment normal.             Significant Labs: All pertinent labs within the past 24 hours have been reviewed.    Significant Imaging: I have reviewed all pertinent imaging results/findings within the past 24 hours.    Assessment/Plan:      * Sepsis without acute organ dysfunction  Fever of unknown origin  - febrile with tachycardia and leukocytosis on admit  - lactic WNL  - source unclear- UA, CXR unremarkable  - COVID/flu negative  - CTH negative  - CT chest/abd/pelvis ordered - Posterior right hepatic lobe heterogeneously enhancing/hypoenhancing area. The abrupt diminished enhancement of posterior right portal vein raises suspicion for thrombosis and associated hepatic infarction.  Other underlying etiology including infection/inflammation not excluded.    Symmetric bilateral perinephric fat stranding with unknown chronicity.  Urinalysis can be attempted to rule out infectious process.   Mild hepatosplenomegaly.   - Obtain ultrasound - negative. MRI pending  - continue BS IV vanc and zosyn for now  - follow blood cx - ngtd  - encourage oral hydration    LOGAN (acute kidney injury)  LOGAN is likely due to pre-renal azotemia due to dehydration and ?contrast . Baseline creatinine is  unknown . Most recent creatinine and eGFR are listed below.  Recent Labs     11/17/24  1749 11/18/24  0212 11/19/24  0358   CREATININE 1.1 1.5* 2.7*   EGFRNORACEVR >60.0 57.1* 28.2*      Plan  - LOGAN is worsening. Will adjust treatment as follows: IVF  - Avoid nephrotoxins and renally dose meds for GFR listed above  - Monitor urine output, serial BMP, and adjust therapy as needed    Head trauma  - reports fall which possibly happened in his sleep (?) w/ head trauma, unknown LOC  - will check CTH given headaches, can also eval sinuses - negative  - neuro checks q4h      VTE Risk Mitigation (From admission, onward)            Ordered     enoxaparin injection 40 mg  Daily         11/17/24 2232     IP VTE HIGH RISK PATIENT  Once         11/17/24 2232                    Discharge Planning   PRESTON: 11/21/2024     Code Status: Full Code   Is the patient medically ready for discharge?:     Reason for patient still in hospital (select all that apply): Patient trending condition and Imaging  Discharge Plan A: Home with family        Cornelio Ba MD  Department of Hospital Medicine   [facility] - Internal Medicine Telemetry

## 2024-11-20 LAB
ANION GAP SERPL CALC-SCNC: 11 MMOL/L (ref 8–16)
BASOPHILS # BLD AUTO: 0.04 K/UL (ref 0–0.2)
BASOPHILS NFR BLD: 0.4 % (ref 0–1.9)
BUN SERPL-MCNC: 16 MG/DL (ref 6–20)
CALCIUM SERPL-MCNC: 8.7 MG/DL (ref 8.7–10.5)
CHLORIDE SERPL-SCNC: 105 MMOL/L (ref 95–110)
CO2 SERPL-SCNC: 27 MMOL/L (ref 23–29)
CREAT SERPL-MCNC: 2.5 MG/DL (ref 0.5–1.4)
DIFFERENTIAL METHOD BLD: ABNORMAL
EOSINOPHIL # BLD AUTO: 0.1 K/UL (ref 0–0.5)
EOSINOPHIL NFR BLD: 1.3 % (ref 0–8)
ERYTHROCYTE [DISTWIDTH] IN BLOOD BY AUTOMATED COUNT: 13.4 % (ref 11.5–14.5)
EST. GFR  (NO RACE VARIABLE): 30.9 ML/MIN/1.73 M^2
GLUCOSE SERPL-MCNC: 97 MG/DL (ref 70–110)
HCT VFR BLD AUTO: 36.1 % (ref 40–54)
HGB BLD-MCNC: 11.8 G/DL (ref 14–18)
IMM GRANULOCYTES # BLD AUTO: 0.07 K/UL (ref 0–0.04)
IMM GRANULOCYTES NFR BLD AUTO: 0.7 % (ref 0–0.5)
LYMPHOCYTES # BLD AUTO: 1.2 K/UL (ref 1–4.8)
LYMPHOCYTES NFR BLD: 12.1 % (ref 18–48)
MAGNESIUM SERPL-MCNC: 2.4 MG/DL (ref 1.6–2.6)
MCH RBC QN AUTO: 28.6 PG (ref 27–31)
MCHC RBC AUTO-ENTMCNC: 32.7 G/DL (ref 32–36)
MCV RBC AUTO: 88 FL (ref 82–98)
MONOCYTES # BLD AUTO: 0.6 K/UL (ref 0.3–1)
MONOCYTES NFR BLD: 5.7 % (ref 4–15)
NEUTROPHILS # BLD AUTO: 7.9 K/UL (ref 1.8–7.7)
NEUTROPHILS NFR BLD: 79.8 % (ref 38–73)
NRBC BLD-RTO: 0 /100 WBC
PHOSPHATE SERPL-MCNC: 4.6 MG/DL (ref 2.7–4.5)
PLATELET # BLD AUTO: 321 K/UL (ref 150–450)
PMV BLD AUTO: 9.3 FL (ref 9.2–12.9)
POTASSIUM SERPL-SCNC: 3.5 MMOL/L (ref 3.5–5.1)
RBC # BLD AUTO: 4.12 M/UL (ref 4.6–6.2)
SODIUM SERPL-SCNC: 143 MMOL/L (ref 136–145)
VANCOMYCIN SERPL-MCNC: 9.5 UG/ML
WBC # BLD AUTO: 9.9 K/UL (ref 3.9–12.7)

## 2024-11-20 PROCEDURE — 25000003 PHARM REV CODE 250

## 2024-11-20 PROCEDURE — 80048 BASIC METABOLIC PNL TOTAL CA: CPT | Performed by: PHYSICIAN ASSISTANT

## 2024-11-20 PROCEDURE — 21400001 HC TELEMETRY ROOM

## 2024-11-20 PROCEDURE — 83735 ASSAY OF MAGNESIUM: CPT | Performed by: PHYSICIAN ASSISTANT

## 2024-11-20 PROCEDURE — 25000003 PHARM REV CODE 250: Performed by: STUDENT IN AN ORGANIZED HEALTH CARE EDUCATION/TRAINING PROGRAM

## 2024-11-20 PROCEDURE — 63600175 PHARM REV CODE 636 W HCPCS: Performed by: STUDENT IN AN ORGANIZED HEALTH CARE EDUCATION/TRAINING PROGRAM

## 2024-11-20 PROCEDURE — 25000003 PHARM REV CODE 250: Performed by: FAMILY MEDICINE

## 2024-11-20 PROCEDURE — 25000003 PHARM REV CODE 250: Performed by: PHYSICIAN ASSISTANT

## 2024-11-20 PROCEDURE — 84100 ASSAY OF PHOSPHORUS: CPT | Performed by: PHYSICIAN ASSISTANT

## 2024-11-20 PROCEDURE — 80202 ASSAY OF VANCOMYCIN: CPT | Performed by: STUDENT IN AN ORGANIZED HEALTH CARE EDUCATION/TRAINING PROGRAM

## 2024-11-20 PROCEDURE — 63600175 PHARM REV CODE 636 W HCPCS: Performed by: PHYSICIAN ASSISTANT

## 2024-11-20 PROCEDURE — 36415 COLL VENOUS BLD VENIPUNCTURE: CPT | Performed by: STUDENT IN AN ORGANIZED HEALTH CARE EDUCATION/TRAINING PROGRAM

## 2024-11-20 PROCEDURE — 85025 COMPLETE CBC W/AUTO DIFF WBC: CPT | Performed by: PHYSICIAN ASSISTANT

## 2024-11-20 RX ORDER — SODIUM CHLORIDE 9 MG/ML
INJECTION, SOLUTION INTRAVENOUS CONTINUOUS
Status: ACTIVE | OUTPATIENT
Start: 2024-11-20 | End: 2024-11-20

## 2024-11-20 RX ADMIN — SODIUM CHLORIDE: 9 INJECTION, SOLUTION INTRAVENOUS at 09:11

## 2024-11-20 RX ADMIN — GUAIFENESIN AND DEXTROMETHORPHAN HYDROBROMIDE 1 TABLET: 600; 30 TABLET, EXTENDED RELEASE ORAL at 09:11

## 2024-11-20 RX ADMIN — Medication 300 ML: at 07:11

## 2024-11-20 RX ADMIN — VANCOMYCIN HYDROCHLORIDE 1000 MG: 1 INJECTION, POWDER, LYOPHILIZED, FOR SOLUTION INTRAVENOUS at 09:11

## 2024-11-20 RX ADMIN — GUAIFENESIN AND DEXTROMETHORPHAN HYDROBROMIDE 2 TABLET: 600; 30 TABLET, EXTENDED RELEASE ORAL at 08:11

## 2024-11-20 RX ADMIN — FLUTICASONE PROPIONATE 100 MCG: 50 SPRAY, METERED NASAL at 09:11

## 2024-11-20 RX ADMIN — Medication 1 CAPSULE: at 12:11

## 2024-11-20 RX ADMIN — PIPERACILLIN SODIUM AND TAZOBACTAM SODIUM 4.5 G: 4; .5 INJECTION, POWDER, FOR SOLUTION INTRAVENOUS at 08:11

## 2024-11-20 RX ADMIN — Medication 300 ML: at 03:11

## 2024-11-20 RX ADMIN — PIPERACILLIN SODIUM AND TAZOBACTAM SODIUM 4.5 G: 4; .5 INJECTION, POWDER, FOR SOLUTION INTRAVENOUS at 11:11

## 2024-11-20 RX ADMIN — Medication 300 ML: at 08:11

## 2024-11-20 RX ADMIN — PIPERACILLIN SODIUM AND TAZOBACTAM SODIUM 4.5 G: 4; .5 INJECTION, POWDER, FOR SOLUTION INTRAVENOUS at 05:11

## 2024-11-20 NOTE — PROGRESS NOTES
"[facility] - Internal Medicine [facility]  Progress Note    Patient Name: Gentry Dowling  MRN: [id number]  Patient Class: IP- Inpatient   Admission Date: 11/17/2024  Length of Stay: 3 days  Attending Physician: Cornelio Ba*  Primary Care Provider: Kishore Erickson MD        Subjective:     Principal Problem:Sepsis without acute organ dysfunction        HPI:  Gentry Dowling is a 48 y.o. male with a PMHx of obesity who presents to [facility] for evaluation of fever. Patient reports fever with associated fatigue, poor appetite, mailase and feeling generally unwell for the past week. He reports mild cough, intermittent right lower quadrant "gas pain" for the past few days. He notices fever usually occurs in the late afternoon which causes difficulty sleeping. He thinks he fell in his sleep and hit his head a few days ago as he woke up with a cut to his head and soreness to the R side of his body. Does not remember the fall and unsure if LOC. Reports intermittent headache which he feels is due to dehydration. Of note, patient reports sinus congestion and pain began about 1 month ago. His mother had similar symptoms about 1 month ago which have since resolved. Denies chest pain, LE swelling, rash, vision changes, N/V, diarrhea. He does mention that he has a family history of leukemia.     Overview/Hospital Course:  ED: Temp 102, , BP 93/63, vitals improved s/p 1L IVFs, tylenol and IV vanc and zosyn. Leukocytosis of WBC 13.6. UA non infectious. POC lactic WNL. CT c/a/p with Posterior right hepatic lobe heterogeneously enhancing/hypoenhancing area.  The abrupt diminished enhancement of posterior right portal vein raises suspicion for thrombosis and associated hepatic infarction.  Other underlying etiology including infection/inflammation not excluded.   Ultrasound negative for portal vein thrombosis. MRI ordered. Pt developed LOGAN which improved with IVF.     Interval History: Febrile overnight 101. " Pt feels unchanged. Cr 2.5 today giving more IVF  Awaiting MRI    Review of Systems  Objective:     Vital Signs (Most Recent):  Temp: 97.9 °F (36.6 °C) (11/20/24 0753)  Pulse: 93 (11/20/24 0753)  Resp: 17 (11/20/24 0753)  BP: 131/89 (11/20/24 0753)  SpO2: 96 % (11/20/24 0753) Vital Signs (24h Range):  Temp:  [97.7 °F (36.5 °C)-101.6 °F (38.7 °C)] 97.9 °F (36.6 °C)  Pulse:  [] 93  Resp:  [17-18] 17  SpO2:  [94 %-97 %] 96 %  BP: (117-135)/(67-89) 131/89     Weight: 111.1 kg (245 lb)  Body mass index is 35.15 kg/m².    Intake/Output Summary (Last 24 hours) at 11/20/2024 1039  Last data filed at 11/19/2024 2315  Gross per 24 hour   Intake 600 ml   Output --   Net 600 ml         Physical Exam  Vitals and nursing note reviewed.   Constitutional:       General: He is not in acute distress.     Appearance: He is well-developed. He is obese.   HENT:      Head: Normocephalic and atraumatic.      Mouth/Throat:      Pharynx: No oropharyngeal exudate.   Eyes:      General: No scleral icterus.     Conjunctiva/sclera: Conjunctivae normal.   Cardiovascular:      Rate and Rhythm: Normal rate and regular rhythm.      Heart sounds: Normal heart sounds.   Pulmonary:      Effort: Pulmonary effort is normal. No respiratory distress.      Breath sounds: Normal breath sounds. No wheezing.   Abdominal:      General: Bowel sounds are normal. There is no distension.      Palpations: Abdomen is soft.      Tenderness: There is no abdominal tenderness.   Musculoskeletal:         General: No tenderness. Normal range of motion.      Cervical back: Normal range of motion.   Lymphadenopathy:      Cervical: No cervical adenopathy.   Skin:     General: Skin is warm and dry.      Capillary Refill: Capillary refill takes less than 2 seconds.      Findings: No rash.   Neurological:      Mental Status: He is alert and oriented to person, place, and time.      Cranial Nerves: No cranial nerve deficit.      Sensory: No sensory deficit.       Coordination: Coordination normal.   Psychiatric:         Behavior: Behavior normal.         Thought Content: Thought content normal.         Judgment: Judgment normal.             Significant Labs: All pertinent labs within the past 24 hours have been reviewed.    Significant Imaging: I have reviewed all pertinent imaging results/findings within the past 24 hours.    Assessment/Plan:      * Sepsis without acute organ dysfunction  Fever of unknown origin  - febrile with tachycardia and leukocytosis on admit  - lactic WNL  - source unclear- UA, CXR unremarkable  - COVID/flu negative  - CTH negative  - CT chest/abd/pelvis ordered - Posterior right hepatic lobe heterogeneously enhancing/hypoenhancing area. The abrupt diminished enhancement of posterior right portal vein raises suspicion for thrombosis and associated hepatic infarction.  Other underlying etiology including infection/inflammation not excluded.    Symmetric bilateral perinephric fat stranding with unknown chronicity.  Urinalysis can be attempted to rule out infectious process.   Mild hepatosplenomegaly.   - Obtain ultrasound - negative for thrombosis. Heterogeneous hypoechoic lesion near the dome of the right hepatic lobe posteriorly.  A mass is not excluded.  Consider MRI liver protocol for further evaluation. MRI ordered  - continue BS IV vanc and zosyn for now  - follow blood cx - ngtd  - encourage oral hydration    LOGAN (acute kidney injury)  LOGAN is likely due to pre-renal azotemia due to dehydration and ?contrast . Baseline creatinine is  unknown . Most recent creatinine and eGFR are listed below.  Recent Labs     11/18/24  0212 11/19/24  0358 11/20/24  0308   CREATININE 1.5* 2.7* 2.5*   EGFRNORACEVR 57.1* 28.2* 30.9*        Plan  - LOGAN is stable  - Avoid nephrotoxins and renally dose meds for GFR listed above  - Monitor urine output, serial BMP, and adjust therapy as needed    Head trauma  - reports fall which possibly happened in his sleep (?) w/  head trauma, unknown LOC  - will check CTH given headaches, can also eval sinuses - negative  - neuro checks q4h      VTE Risk Mitigation (From admission, onward)           Ordered     enoxaparin injection 40 mg  Daily         11/17/24 2232     IP VTE HIGH RISK PATIENT  Once         11/17/24 2232                    Discharge Planning   PRESTON: 11/23/2024     Code Status: Full Code   Is the patient medically ready for discharge?:     Reason for patient still in hospital (select all that apply): Patient trending condition and Treatment  Discharge Plan A: Home with family        Cornelio Ba MD  Department of Hospital Medicine   [facility] - Internal Medicine Telemetry

## 2024-11-20 NOTE — ASSESSMENT & PLAN NOTE
Fever of unknown origin  - febrile with tachycardia and leukocytosis on admit  - lactic WNL  - source unclear- UA, CXR unremarkable  - COVID/flu negative  - CTH negative  - CT chest/abd/pelvis ordered - Posterior right hepatic lobe heterogeneously enhancing/hypoenhancing area. The abrupt diminished enhancement of posterior right portal vein raises suspicion for thrombosis and associated hepatic infarction.  Other underlying etiology including infection/inflammation not excluded.    Symmetric bilateral perinephric fat stranding with unknown chronicity.  Urinalysis can be attempted to rule out infectious process.   Mild hepatosplenomegaly.   - Obtain ultrasound - negative for thrombosis. Heterogeneous hypoechoic lesion near the dome of the right hepatic lobe posteriorly.  A mass is not excluded.  Consider MRI liver protocol for further evaluation. MRI ordered  - continue BS IV vanc and zosyn for now  - follow blood cx - ngtd  - encourage oral hydration

## 2024-11-20 NOTE — SUBJECTIVE & OBJECTIVE
Interval History: Febrile overnight 101. Pt feels unchanged. Cr 2.5 today giving more IVF  Awaiting MRI    Review of Systems  Objective:     Vital Signs (Most Recent):  Temp: 97.9 °F (36.6 °C) (11/20/24 0753)  Pulse: 93 (11/20/24 0753)  Resp: 17 (11/20/24 0753)  BP: 131/89 (11/20/24 0753)  SpO2: 96 % (11/20/24 0753) Vital Signs (24h Range):  Temp:  [97.7 °F (36.5 °C)-101.6 °F (38.7 °C)] 97.9 °F (36.6 °C)  Pulse:  [] 93  Resp:  [17-18] 17  SpO2:  [94 %-97 %] 96 %  BP: (117-135)/(67-89) 131/89     Weight: 111.1 kg (245 lb)  Body mass index is 35.15 kg/m².    Intake/Output Summary (Last 24 hours) at 11/20/2024 1039  Last data filed at 11/19/2024 2315  Gross per 24 hour   Intake 600 ml   Output --   Net 600 ml         Physical Exam  Vitals and nursing note reviewed.   Constitutional:       General: He is not in acute distress.     Appearance: He is well-developed. He is obese.   HENT:      Head: Normocephalic and atraumatic.      Mouth/Throat:      Pharynx: No oropharyngeal exudate.   Eyes:      General: No scleral icterus.     Conjunctiva/sclera: Conjunctivae normal.   Cardiovascular:      Rate and Rhythm: Normal rate and regular rhythm.      Heart sounds: Normal heart sounds.   Pulmonary:      Effort: Pulmonary effort is normal. No respiratory distress.      Breath sounds: Normal breath sounds. No wheezing.   Abdominal:      General: Bowel sounds are normal. There is no distension.      Palpations: Abdomen is soft.      Tenderness: There is no abdominal tenderness.   Musculoskeletal:         General: No tenderness. Normal range of motion.      Cervical back: Normal range of motion.   Lymphadenopathy:      Cervical: No cervical adenopathy.   Skin:     General: Skin is warm and dry.      Capillary Refill: Capillary refill takes less than 2 seconds.      Findings: No rash.   Neurological:      Mental Status: He is alert and oriented to person, place, and time.      Cranial Nerves: No cranial nerve deficit.       Sensory: No sensory deficit.      Coordination: Coordination normal.   Psychiatric:         Behavior: Behavior normal.         Thought Content: Thought content normal.         Judgment: Judgment normal.             Significant Labs: All pertinent labs within the past 24 hours have been reviewed.    Significant Imaging: I have reviewed all pertinent imaging results/findings within the past 24 hours.

## 2024-11-20 NOTE — PROGRESS NOTES
Pharmacokinetic Assessment Follow Up: IV Vancomycin    Vancomycin serum concentration assessment(s):    The random level was drawn correctly and can be used to guide therapy at this time. The measurement is within the desired definitive target range of 10 to 20 mcg/mL.    Vancomycin Regimen Plan:    Patients scr appears to be improving but is still well above baseline  Plan to c/w pulse dose  Plan to give 1000 mg IV vancomycin today and follow up w/ another random level w/ AM labs on 11/21    Drug levels (last 3 results):  Recent Labs   Lab Result Units 11/18/24  0822 11/19/24 0358 11/20/24  0308   Vancomycin, Random ug/mL 17.0 26.3 9.5     Pharmacy will continue to follow and monitor vancomycin.    Please contact pharmacy at extension [phone] for questions regarding this assessment.    Thank you for the consult,   Nawaf Tompkins, PharmD, [facility]      Patient brief summary:  Gentry Dowling is a 48 y.o. male initiated on antimicrobial therapy with IV Vancomycin for treatment of sepsis    Drug Allergies:   Review of patient's allergies indicates:  No Known Allergies    Actual Body Weight:   111.1 kg    Renal Function:   Estimated Creatinine Clearance: 45.1 mL/min (A) (based on SCr of 2.5 mg/dL (H)).,     Dialysis Method (if applicable):  N/A    CBC (last 72 hours):  Recent Labs   Lab Result Units 11/17/24 1749 11/18/24  0212 11/19/24 0358 11/20/24  0308   WBC K/uL 13.62* 14.59* 12.07 9.90   Hemoglobin g/dL 13.6* 13.3* 12.1* 11.8*   Hemoglobin A1C %  --  5.0  --   --    Hematocrit % 41.5 40.4 37.4* 36.1*   Platelets K/uL 366 354 300 321   Gran % % 85.3* 81.7* 83.6* 79.8*   Lymph % % 8.2* 10.5* 9.0* 12.1*   Mono % % 5.4 6.4 5.7 5.7   Eosinophil % % 0.3 0.5 0.9 1.3   Basophil % % 0.4 0.4 0.3 0.4   Differential Method  Automated Automated Automated Automated       Metabolic Panel (last 72 hours):  Recent Labs   Lab Result Units 11/17/24  1735 11/17/24  1749 11/18/24  0212 11/19/24  0358 11/19/24  1143 11/20/24  0308    Sodium mmol/L  --  138 141 140  --  143   Sodium, Urine mmol/L  --   --   --   --  37  --    Potassium mmol/L  --  3.8 3.7 3.5  --  3.5   Chloride mmol/L  --  102 104 106  --  105   CO2 mmol/L  --  26 24 24  --  27   Glucose mg/dL  --  97 92 112*  --  97   Glucose, UA  Negative  --   --   --   --   --    BUN mg/dL  --  12 15 19  --  16   Creatinine mg/dL  --  1.1 1.5* 2.7*  --  2.5*   Creatinine, Urine mg/dL  --   --   --   --  100.0  100.0  --    Albumin g/dL  --  2.8*  --   --   --   --    Total Bilirubin mg/dL  --  0.9  --   --   --   --    Alkaline Phosphatase U/L  --  102  --   --   --   --    AST U/L  --  18  --   --   --   --    ALT U/L  --  41  --   --   --   --    Magnesium mg/dL  --   --  2.3 2.3  --  2.4   Phosphorus mg/dL  --   --  3.6 4.0  --  4.6*       Vancomycin Administrations:  vancomycin given in the last 96 hours                     vancomycin 750 mg in D5W 250 mL IVPB (admixture device) (mg) 750 mg New Bag 11/18/24 1100    vancomycin 2 g in dextrose 5 % 500 mL IVPB (mg) 2,000 mg New Bag 11/17/24 1957                    Microbiologic Results:  Microbiology Results (last 7 days)       Procedure Component Value Units Date/Time    Blood culture #2 **CANNOT BE ORDERED STAT** [[id number]] Collected: 11/17/24 1749    Order Status: Completed Specimen: Blood from Peripheral, Upper Arm, Right Updated: 11/19/24 2012     Blood Culture, Routine No Growth to date      No Growth to date      No Growth to date    Blood culture #1 **CANNOT BE ORDERED STAT** [[id number]] Collected: 11/17/24 1749    Order Status: Completed Specimen: Blood from Peripheral, Upper Arm, Right Updated: 11/19/24 2012     Blood Culture, Routine No Growth to date      No Growth to date      No Growth to date    Influenza A & B by Molecular [[id number]] Collected: 11/17/24 1721    Order Status: Completed Specimen: Nasopharyngeal Swab Updated: 11/17/24 1756     Influenza A, Molecular Negative     Influenza B, Molecular Negative     Flu A  & B Source Nasal swab

## 2024-11-20 NOTE — PLAN OF CARE
AAOx4; POC reviewed & verbalizes understanding.  Admit DX: Sepsis w/o acute organ dysfunction  Afebrile. No deficits noted  C/o increase cough & presence of nasal drip..macular degeneration notified & orders entered  IV access & IVF: PIV infusing NS @ 100  ABX: Vancomycin & zosyn admin, tolerating well  BM:  11/19/24, yellow in color & loose  : WNL  Appetite: adequate  Bed alarm set; fall precautions maintained.   Bed locked in lowest position, side rails up x2, call light within reach, environment clear. Encouraged pt to call nurse with any concerns.  Safety measures maintained

## 2024-11-20 NOTE — ASSESSMENT & PLAN NOTE
LOGAN is likely due to pre-renal azotemia due to dehydration and ?contrast . Baseline creatinine is  unknown . Most recent creatinine and eGFR are listed below.  Recent Labs     11/18/24  0212 11/19/24  0358 11/20/24  0308   CREATININE 1.5* 2.7* 2.5*   EGFRNORACEVR 57.1* 28.2* 30.9*        Plan  - LOGAN is stable  - Avoid nephrotoxins and renally dose meds for GFR listed above  - Monitor urine output, serial BMP, and adjust therapy as needed

## 2024-11-21 PROBLEM — E87.6 HYPOKALEMIA: Status: ACTIVE | Noted: 2024-11-21

## 2024-11-21 LAB
ALBUMIN SERPL BCP-MCNC: 2.4 G/DL (ref 3.5–5.2)
ALP SERPL-CCNC: 93 U/L (ref 40–150)
ALT SERPL W/O P-5'-P-CCNC: 47 U/L (ref 10–44)
ANION GAP SERPL CALC-SCNC: 8 MMOL/L (ref 8–16)
AST SERPL-CCNC: 26 U/L (ref 10–40)
BILIRUB DIRECT SERPL-MCNC: 0.1 MG/DL (ref 0.1–0.3)
BILIRUB SERPL-MCNC: 0.3 MG/DL (ref 0.1–1)
BUN SERPL-MCNC: 12 MG/DL (ref 6–20)
CALCIUM SERPL-MCNC: 9 MG/DL (ref 8.7–10.5)
CHLORIDE SERPL-SCNC: 106 MMOL/L (ref 95–110)
CO2 SERPL-SCNC: 29 MMOL/L (ref 23–29)
CREAT SERPL-MCNC: 1.9 MG/DL (ref 0.5–1.4)
EST. GFR  (NO RACE VARIABLE): 43 ML/MIN/1.73 M^2
GLUCOSE SERPL-MCNC: 90 MG/DL (ref 70–110)
POTASSIUM SERPL-SCNC: 3.4 MMOL/L (ref 3.5–5.1)
PROT SERPL-MCNC: 6.5 G/DL (ref 6–8.4)
SODIUM SERPL-SCNC: 143 MMOL/L (ref 136–145)
VANCOMYCIN SERPL-MCNC: 9.8 UG/ML

## 2024-11-21 PROCEDURE — 25000003 PHARM REV CODE 250: Performed by: STUDENT IN AN ORGANIZED HEALTH CARE EDUCATION/TRAINING PROGRAM

## 2024-11-21 PROCEDURE — 36415 COLL VENOUS BLD VENIPUNCTURE: CPT | Performed by: STUDENT IN AN ORGANIZED HEALTH CARE EDUCATION/TRAINING PROGRAM

## 2024-11-21 PROCEDURE — 80076 HEPATIC FUNCTION PANEL: CPT | Performed by: STUDENT IN AN ORGANIZED HEALTH CARE EDUCATION/TRAINING PROGRAM

## 2024-11-21 PROCEDURE — A9585 GADOBUTROL INJECTION: HCPCS | Performed by: STUDENT IN AN ORGANIZED HEALTH CARE EDUCATION/TRAINING PROGRAM

## 2024-11-21 PROCEDURE — 80048 BASIC METABOLIC PNL TOTAL CA: CPT

## 2024-11-21 PROCEDURE — 63600175 PHARM REV CODE 636 W HCPCS: Performed by: PHYSICIAN ASSISTANT

## 2024-11-21 PROCEDURE — 80202 ASSAY OF VANCOMYCIN: CPT | Performed by: STUDENT IN AN ORGANIZED HEALTH CARE EDUCATION/TRAINING PROGRAM

## 2024-11-21 PROCEDURE — 21400001 HC TELEMETRY ROOM

## 2024-11-21 PROCEDURE — 25500020 PHARM REV CODE 255: Performed by: STUDENT IN AN ORGANIZED HEALTH CARE EDUCATION/TRAINING PROGRAM

## 2024-11-21 PROCEDURE — 25000003 PHARM REV CODE 250: Performed by: PHYSICIAN ASSISTANT

## 2024-11-21 PROCEDURE — 25000003 PHARM REV CODE 250

## 2024-11-21 PROCEDURE — 63600175 PHARM REV CODE 636 W HCPCS: Performed by: STUDENT IN AN ORGANIZED HEALTH CARE EDUCATION/TRAINING PROGRAM

## 2024-11-21 PROCEDURE — 36415 COLL VENOUS BLD VENIPUNCTURE: CPT | Mod: XB

## 2024-11-21 RX ORDER — GADOBUTROL 604.72 MG/ML
10 INJECTION INTRAVENOUS
Status: COMPLETED | OUTPATIENT
Start: 2024-11-21 | End: 2024-11-21

## 2024-11-21 RX ADMIN — PIPERACILLIN SODIUM AND TAZOBACTAM SODIUM 4.5 G: 4; .5 INJECTION, POWDER, FOR SOLUTION INTRAVENOUS at 10:11

## 2024-11-21 RX ADMIN — Medication 1 CAPSULE: at 09:11

## 2024-11-21 RX ADMIN — VANCOMYCIN HYDROCHLORIDE 1250 MG: 1.25 INJECTION, POWDER, LYOPHILIZED, FOR SOLUTION INTRAVENOUS at 09:11

## 2024-11-21 RX ADMIN — Medication 300 ML: at 03:11

## 2024-11-21 RX ADMIN — Medication 6 MG: at 10:11

## 2024-11-21 RX ADMIN — FLUTICASONE PROPIONATE 100 MCG: 50 SPRAY, METERED NASAL at 09:11

## 2024-11-21 RX ADMIN — PIPERACILLIN SODIUM AND TAZOBACTAM SODIUM 4.5 G: 4; .5 INJECTION, POWDER, FOR SOLUTION INTRAVENOUS at 03:11

## 2024-11-21 RX ADMIN — GUAIFENESIN AND DEXTROMETHORPHAN HYDROBROMIDE 2 TABLET: 600; 30 TABLET, EXTENDED RELEASE ORAL at 09:11

## 2024-11-21 RX ADMIN — Medication 300 ML: at 07:11

## 2024-11-21 RX ADMIN — Medication 300 ML: at 09:11

## 2024-11-21 RX ADMIN — ACETAMINOPHEN 650 MG: 325 TABLET ORAL at 02:11

## 2024-11-21 RX ADMIN — GADOBUTROL 10 ML: 604.72 INJECTION INTRAVENOUS at 12:11

## 2024-11-21 RX ADMIN — Medication 300 ML: at 12:11

## 2024-11-21 NOTE — PLAN OF CARE
Problem: Adult Inpatient Plan of Care  Goal: Plan of Care Review  Outcome: Progressing  Goal: Patient-Specific Goal (Individualized)  Outcome: Progressing  Goal: Absence of Hospital-Acquired Illness or Injury  Outcome: Progressing  Goal: Optimal Comfort and Wellbeing  Outcome: Progressing  Goal: Readiness for Transition of Care  Outcome: Progressing     Problem: Sepsis/Septic Shock  Goal: Optimal Coping  Outcome: Progressing  Goal: Absence of Bleeding  Outcome: Progressing  Goal: Blood Glucose Level Within Targeted Range  Outcome: Progressing  Goal: Absence of Infection Signs and Symptoms  Outcome: Progressing  Goal: Optimal Nutrition Intake  Outcome: Progressing     Problem: Infection  Goal: Absence of Infection Signs and Symptoms  Outcome: Progressing     Problem: Skin Injury Risk Increased  Goal: Skin Health and Integrity  Outcome: Progressing     Problem: Acute Kidney Injury/Impairment  Goal: Fluid and Electrolyte Balance  Outcome: Progressing  Goal: Improved Oral Intake  Outcome: Progressing  Goal: Effective Renal Function  Outcome: Progressing

## 2024-11-21 NOTE — SUBJECTIVE & OBJECTIVE
Interval History: Last fever was 11/19.  Patient does not appear toxic and endorses feeling significantly better than in past days.  Fever has resolved but he endorses left shoulder pain that is chronic, unchanged, and associated with rotator's cuff/labrum issues. Renal function improving. He further endorses vague right upper quadrant abdominal pain. Notably, he states that he had sweats overnight.    Review of Systems   Constitutional:  Positive for diaphoresis. Negative for appetite change.   HENT:  Negative for congestion and dental problem.    Eyes:  Negative for photophobia.   Respiratory:  Negative for cough and shortness of breath.    Cardiovascular:  Negative for chest pain and palpitations.   Gastrointestinal:  Positive for abdominal pain. Negative for constipation, diarrhea and nausea.   Genitourinary:  Negative for difficulty urinating and dysuria.   Musculoskeletal:  Positive for neck pain.        Shoulder pain in known rotator's cuff pathology in left shoulder.   Neurological:  Negative for numbness and headaches.     Objective:     Vital Signs (Most Recent):  Temp: 98 °F (36.7 °C) (11/21/24 1058)  Pulse: 83 (11/21/24 1058)  Resp: 17 (11/21/24 1058)  BP: 124/84 (11/21/24 1058)  SpO2: 96 % (11/21/24 1058) Vital Signs (24h Range):  Temp:  [97.8 °F (36.6 °C)-98.9 °F (37.2 °C)] 98 °F (36.7 °C)  Pulse:  [81-89] 83  Resp:  [17-18] 17  SpO2:  [95 %-96 %] 96 %  BP: (105-129)/(64-84) 124/84     Weight: 111.1 kg (245 lb)  Body mass index is 35.15 kg/m².    Intake/Output Summary (Last 24 hours) at 11/21/2024 1212  Last data filed at 11/21/2024 0917  Gross per 24 hour   Intake 900 ml   Output --   Net 900 ml         Physical Exam  Constitutional:       Appearance: He is not toxic-appearing.   HENT:      Head: Normocephalic and atraumatic.   Eyes:      General: No scleral icterus.     Conjunctiva/sclera: Conjunctivae normal.   Cardiovascular:      Rate and Rhythm: Normal rate and regular rhythm.   Pulmonary:       "Effort: Pulmonary effort is normal.      Breath sounds: No wheezing.   Abdominal:      Tenderness: There is no guarding.   Musculoskeletal:      Right lower leg: No edema.      Left lower leg: No edema.   Skin:     Coloration: Skin is not jaundiced.   Neurological:      General: No focal deficit present.      Mental Status: He is alert and oriented to person, place, and time.             Significant Labs: All pertinent labs within the past 24 hours have been reviewed.  Blood Culture: No results for input(s): "LABBLOO" in the last 48 hours.  CMP:   Recent Labs   Lab 11/20/24  0308 11/21/24  1024    143   K 3.5 3.4*    106   CO2 27 29   GLU 97 90   BUN 16 12   CREATININE 2.5* 1.9*   CALCIUM 8.7 9.0   ANIONGAP 11 8       Significant Imaging: I have reviewed all pertinent imaging results/findings within the past 24 hours.  "

## 2024-11-21 NOTE — ASSESSMENT & PLAN NOTE
- reports fall which possibly happened in his sleep (?) w/ head trauma, unknown LOC  - will check CTH given headaches, can also eval sinuses - negative  - neuro checks q4h  - alert and fully oriented

## 2024-11-21 NOTE — PROGRESS NOTES
Pharmacokinetic Assessment Follow Up: IV Vancomycin    Vancomycin serum concentration assessment(s):    The random level was drawn correctly and can be used to guide therapy at this time. The measurement is below the desired definitive target range of 10 to 20 mcg/mL.    Vancomycin Regimen Plan:    Unfortunately there were no labs ordered for this morning so uncertain of the scr trend - will plan to c/t pulse dose to be safe  Plan to give a single 1.25 g IV vancomycin dose today followed by another random level w/ AM labs on 11/22  Ordered a scr to be drawn w/ the vancomycin random level on 11/22 for assessment of renal function    Drug levels (last 3 results):  Recent Labs   Lab Result Units 11/19/24  0358 11/20/24  0308 11/21/24  0323   Vancomycin, Random ug/mL 26.3 9.5 9.8     Pharmacy will continue to follow and monitor vancomycin.    Please contact pharmacy at extension [phone] for questions regarding this assessment.    Thank you for the consult,   Nawaf Tompkins, PharmD, [facility]      Patient brief summary:  Gentry Dowling is a 48 y.o. male initiated on antimicrobial therapy with IV Vancomycin for treatment of sepsis    Drug Allergies:   Review of patient's allergies indicates:  No Known Allergies    Actual Body Weight:   111.1 kg    Renal Function:   Estimated Creatinine Clearance: 45.1 mL/min (A) (based on SCr of 2.5 mg/dL (H)).,     Dialysis Method (if applicable):  N/A    CBC (last 72 hours):  Recent Labs   Lab Result Units 11/19/24 0358 11/20/24  0308   WBC K/uL 12.07 9.90   Hemoglobin g/dL 12.1* 11.8*   Hematocrit % 37.4* 36.1*   Platelets K/uL 300 321   Gran % % 83.6* 79.8*   Lymph % % 9.0* 12.1*   Mono % % 5.7 5.7   Eosinophil % % 0.9 1.3   Basophil % % 0.3 0.4   Differential Method  Automated Automated       Metabolic Panel (last 72 hours):  Recent Labs   Lab Result Units 11/19/24  0358 11/19/24  1143 11/20/24  0308   Sodium mmol/L 140  --  143   Sodium, Urine mmol/L  --  37  --    Potassium mmol/L 3.5   --  3.5   Chloride mmol/L 106  --  105   CO2 mmol/L 24  --  27   Glucose mg/dL 112*  --  97   BUN mg/dL 19  --  16   Creatinine mg/dL 2.7*  --  2.5*   Creatinine, Urine mg/dL  --  100.0  100.0  --    Magnesium mg/dL 2.3  --  2.4   Phosphorus mg/dL 4.0  --  4.6*       Vancomycin Administrations:  vancomycin given in the last 96 hours                     vancomycin (VANCOCIN) 1,000 mg in D5W 250 mL IVPB (admixture device) (mg) 1,000 mg New Bag 11/20/24 0908    vancomycin 750 mg in D5W 250 mL IVPB (admixture device) (mg) 750 mg New Bag 11/18/24 1100    vancomycin 2 g in dextrose 5 % 500 mL IVPB (mg) 2,000 mg New Bag 11/17/24 1957                    Microbiologic Results:  Microbiology Results (last 7 days)       Procedure Component Value Units Date/Time    Blood culture #1 **CANNOT BE ORDERED STAT** [[id number]] Collected: 11/17/24 1749    Order Status: Completed Specimen: Blood from Peripheral, Upper Arm, Right Updated: 11/20/24 2012     Blood Culture, Routine No Growth to date      No Growth to date      No Growth to date      No Growth to date    Blood culture #2 **CANNOT BE ORDERED STAT** [[id number]] Collected: 11/17/24 1749    Order Status: Completed Specimen: Blood from Peripheral, Upper Arm, Right Updated: 11/20/24 2012     Blood Culture, Routine No Growth to date      No Growth to date      No Growth to date      No Growth to date    Influenza A & B by Molecular [[id number]] Collected: 11/17/24 1721    Order Status: Completed Specimen: Nasopharyngeal Swab Updated: 11/17/24 1756     Influenza A, Molecular Negative     Influenza B, Molecular Negative     Flu A & B Source Nasal swab

## 2024-11-21 NOTE — ASSESSMENT & PLAN NOTE
LOGAN is likely due to pre-renal azotemia due to dehydration and ?contrast . Baseline creatinine is  unknown . Most recent creatinine and eGFR are listed below.  Recent Labs     11/19/24  0358 11/20/24  0308 11/21/24  1024   CREATININE 2.7* 2.5* 1.9*   EGFRNORACEVR 28.2* 30.9* 43.0*        Plan  - LOGAN is improving  - Avoid nephrotoxins and renally dose meds for GFR listed above  - Monitor urine output, serial BMP, and adjust therapy as needed; will review urine studies and FENA

## 2024-11-21 NOTE — ASSESSMENT & PLAN NOTE
Fever of unknown origin  - febrile with tachycardia and leukocytosis on admit  - lactic WNL  - source unclear- UA, CXR unremarkable  - COVID/flu negative; HIV neg  - CTH negative  - CT chest/abd/pelvis ordered - Posterior right hepatic lobe heterogeneously enhancing/hypoenhancing area. The abrupt diminished enhancement of posterior right portal vein raises suspicion for thrombosis and associated hepatic infarction.  Other underlying etiology including infection/inflammation not excluded.    Symmetric bilateral perinephric fat stranding with unknown chronicity.  Urinalysis can be attempted to rule out infectious process.   Mild hepatosplenomegaly.   - Obtain ultrasound - negative for thrombosis. Heterogeneous hypoechoic lesion near the dome of the right hepatic lobe posteriorly.  A mass is not excluded.  Consider MRI liver protocol for further evaluation. MRI ordered  - continue BS IV vanc and zosyn for now  - follow blood cx - ngtd (taken 11/17)  - no photophobia or neck rigidity, had joint injections but roughly 8 years ago, no rash, pulmonary symptoms, murmur, or sinus discomfort; difficult to ascertain cause at this stage but improving overall  - will continue to monitor progress and obtain additional labs as appropriate  - encourage oral hydration

## 2024-11-21 NOTE — PLAN OF CARE
Problem: Adult Inpatient Plan of Care  Goal: Plan of Care Review  Outcome: Progressing  Goal: Patient-Specific Goal (Individualized)  Outcome: Progressing  Goal: Absence of Hospital-Acquired Illness or Injury  Outcome: Progressing  Goal: Optimal Comfort and Wellbeing  Outcome: Progressing  Goal: Readiness for Transition of Care  Outcome: Progressing     Problem: Sepsis/Septic Shock  Goal: Optimal Coping  Outcome: Progressing  Goal: Absence of Bleeding  Outcome: Progressing  Goal: Blood Glucose Level Within Targeted Range  Outcome: Progressing  Goal: Absence of Infection Signs and Symptoms  Outcome: Progressing  Goal: Optimal Nutrition Intake  Outcome: Progressing     Problem: Infection  Goal: Absence of Infection Signs and Symptoms  Outcome: Progressing     Problem: Skin Injury Risk Increased  Goal: Skin Health and Integrity  Outcome: Progressing     Problem: Acute Kidney Injury/Impairment  Goal: Fluid and Electrolyte Balance  Outcome: Progressing  Goal: Improved Oral Intake  Outcome: Progressing  Goal: Effective Renal Function  Outcome: Progressing     Reports of headache, pain in the area between right shoulder and neck, and lower back. Tylenol given with reported relief. Loose bowel movement x 2. MRI completed.   VS WNL no febrile episodes. IV antibiotics given.    Bed locked and in low position, call light within patient reach.   Patient refused non-skid socks.

## 2024-11-21 NOTE — PROGRESS NOTES
"[facility] - Internal Medicine [facility]  Progress Note    Patient Name: Gentry Dowling  MRN: [id number]  Patient Class: IP- Inpatient   Admission Date: 11/17/2024  Length of Stay: 4 days  Attending Physician: Barrett Reno MD  Primary Care Provider: Kishore Erickson MD        Subjective:     Principal Problem:Sepsis without acute organ dysfunction        HPI:  Gentry Dowling is a 48 y.o. male with a PMHx of obesity who [place] [facility] [place]. Patient reports fever with associated fatigue, poor appetite, mailase and feeling generally unwell for the past week. He reports mild cough, intermittent right lower quadrant "gas pain" for the past few days. He notices fever usually occurs in the late afternoon which causes difficulty sleeping. He thinks he fell in his sleep and hit his head a few days ago as he woke up with a cut to his head and soreness to the R side of his body. Does not remember the fall and unsure if LOC. Reports intermittent headache which he feels is due to dehydration. Of note, patient reports sinus congestion and pain began about 1 month ago. His mother had similar symptoms about 1 month ago which have since resolved. Denies chest pain, LE swelling, rash, vision changes, N/V, diarrhea. He does mention that he has a family history of leukemia.     Overview/Hospital Course:  ED: Temp 102, , BP 93/63, vitals improved s/p 1L IVFs, tylenol and IV vanc and zosyn. Leukocytosis of WBC 13.6. UA non infectious. POC lactic WNL. CT c/a/p with Posterior right hepatic lobe heterogeneously enhancing/hypoenhancing area.  The abrupt diminished enhancement of posterior right portal vein raises suspicion for thrombosis and associated hepatic infarction.  Other underlying etiology including infection/inflammation not excluded.   Ultrasound negative for portal vein thrombosis. MRI ordered. Pt developed LOGAN which improved with IVF.     Interval History: Last fever was 11/19.  " Patient does not appear toxic and endorses feeling significantly better than in past days.  Fever has resolved but he endorses left shoulder pain that is chronic, unchanged, and associated with rotator's cuff/labrum issues. Renal function improving. He further endorses vague right upper quadrant abdominal pain. Notably, he states that he had sweats overnight.    Review of Systems   Constitutional:  Positive for diaphoresis. Negative for appetite change.   HENT:  Negative for congestion and dental problem.    Eyes:  Negative for photophobia.   Respiratory:  Negative for cough and shortness of breath.    Cardiovascular:  Negative for chest pain and palpitations.   Gastrointestinal:  Positive for abdominal pain. Negative for constipation, diarrhea and nausea.   Genitourinary:  Negative for difficulty urinating and dysuria.   Musculoskeletal:  Positive for neck pain.        Shoulder pain in known rotator's cuff pathology in left shoulder.   Neurological:  Negative for numbness and headaches.     Objective:     Vital Signs (Most Recent):  Temp: 98 °F (36.7 °C) (11/21/24 1058)  Pulse: 83 (11/21/24 1058)  Resp: 17 (11/21/24 1058)  BP: 124/84 (11/21/24 1058)  SpO2: 96 % (11/21/24 1058) Vital Signs (24h Range):  Temp:  [97.8 °F (36.6 °C)-98.9 °F (37.2 °C)] 98 °F (36.7 °C)  Pulse:  [81-89] 83  Resp:  [17-18] 17  SpO2:  [95 %-96 %] 96 %  BP: (105-129)/(64-84) 124/84     Weight: 111.1 kg (245 lb)  Body mass index is 35.15 kg/m².    Intake/Output Summary (Last 24 hours) at 11/21/2024 1212  Last data filed at 11/21/2024 0917  Gross per 24 hour   Intake 900 ml   Output --   Net 900 ml         Physical Exam  Constitutional:       Appearance: He is not toxic-appearing.   HENT:      Head: Normocephalic and atraumatic.   Eyes:      General: No scleral icterus.     Conjunctiva/sclera: Conjunctivae normal.   Cardiovascular:      Rate and Rhythm: Normal rate and regular rhythm.   Pulmonary:      Effort: Pulmonary effort is normal.       "Breath sounds: No wheezing.   Abdominal:      Tenderness: There is no guarding.   Musculoskeletal:      Right lower leg: No edema.      Left lower leg: No edema.   Skin:     Coloration: Skin is not jaundiced.   Neurological:      General: No focal deficit present.      Mental Status: He is alert and oriented to person, place, and time.             Significant Labs: All pertinent labs within the past 24 hours have been reviewed.  Blood Culture: No results for input(s): "LABBLOO" in the last 48 hours.  CMP:   Recent Labs   Lab 11/20/24  0308 11/21/24  1024    143   K 3.5 3.4*    106   CO2 27 29   GLU 97 90   BUN 16 12   CREATININE 2.5* 1.9*   CALCIUM 8.7 9.0   ANIONGAP 11 8       Significant Imaging: I have reviewed all pertinent imaging results/findings within the past 24 hours.    Assessment/Plan:      * Sepsis without acute organ dysfunction  Fever of unknown origin  - febrile with tachycardia and leukocytosis on admit  - lactic WNL  - source unclear- UA, CXR unremarkable  - COVID/flu negative; HIV neg  - CTH negative  - CT chest/abd/pelvis ordered - Posterior right hepatic lobe heterogeneously enhancing/hypoenhancing area. The abrupt diminished enhancement of posterior right portal vein raises suspicion for thrombosis and associated hepatic infarction.  Other underlying etiology including infection/inflammation not excluded.    Symmetric bilateral perinephric fat stranding with unknown chronicity.  Urinalysis can be attempted to rule out infectious process.   Mild hepatosplenomegaly.   - Obtain ultrasound - negative for thrombosis. Heterogeneous hypoechoic lesion near the dome of the right hepatic lobe posteriorly.  A mass is not excluded.  Consider MRI liver protocol for further evaluation. MRI ordered  - continue BS IV vanc and zosyn for now  - follow blood cx - ngtd (taken 11/17)  - no photophobia or neck rigidity, had joint injections but roughly 8 years ago, no rash, pulmonary symptoms, murmur, or " sinus discomfort; difficult to ascertain cause at this stage but improving overall  - will continue to monitor progress and obtain additional labs as appropriate  - encourage oral hydration    LOGAN (acute kidney injury)  LOGAN is likely due to pre-renal azotemia due to dehydration and ?contrast . Baseline creatinine is  unknown . Most recent creatinine and eGFR are listed below.  Recent Labs     11/19/24  0358 11/20/24  0308 11/21/24  1024   CREATININE 2.7* 2.5* 1.9*   EGFRNORACEVR 28.2* 30.9* 43.0*        Plan  - LOGAN is improving  - Avoid nephrotoxins and renally dose meds for GFR listed above  - Monitor urine output, serial BMP, and adjust therapy as needed; will review urine studies and FENA    Head trauma  - reports fall which possibly happened in his sleep (?) w/ head trauma, unknown LOC  - will check CTH given headaches, can also eval sinuses - negative  - neuro checks q4h  - alert and fully oriented      VTE Risk Mitigation (From admission, onward)           Ordered     enoxaparin injection 40 mg  Daily         11/17/24 2232     IP VTE HIGH RISK PATIENT  Once         11/17/24 2232                    Discharge Planning   PRESTON: 11/23/2024     Code Status: Full Code   Is the patient medically ready for discharge?: No  Awaiting MRI to assess liver and further workup, resolution of LOGAN    Reason for patient still in hospital (select all that apply): Patient trending condition  Discharge Plan A: Home with family              Barrett Reno MD  Department of Hospital Medicine   [facility] - Internal Medicine Telemetry

## 2024-11-22 LAB
ALBUMIN SERPL BCP-MCNC: 2.4 G/DL (ref 3.5–5.2)
ALP SERPL-CCNC: 94 U/L (ref 40–150)
ALT SERPL W/O P-5'-P-CCNC: 65 U/L (ref 10–44)
ANION GAP SERPL CALC-SCNC: 10 MMOL/L (ref 8–16)
AST SERPL-CCNC: 27 U/L (ref 10–40)
BACTERIA BLD CULT: NORMAL
BACTERIA BLD CULT: NORMAL
BASOPHILS # BLD AUTO: 0.04 K/UL (ref 0–0.2)
BASOPHILS NFR BLD: 0.5 % (ref 0–1.9)
BILIRUB SERPL-MCNC: 0.4 MG/DL (ref 0.1–1)
BUN SERPL-MCNC: 14 MG/DL (ref 6–20)
CALCIUM SERPL-MCNC: 8.9 MG/DL (ref 8.7–10.5)
CHLORIDE SERPL-SCNC: 106 MMOL/L (ref 95–110)
CO2 SERPL-SCNC: 24 MMOL/L (ref 23–29)
CREAT SERPL-MCNC: 1.7 MG/DL (ref 0.5–1.4)
CREAT SERPL-MCNC: 1.7 MG/DL (ref 0.5–1.4)
DIFFERENTIAL METHOD BLD: ABNORMAL
EOSINOPHIL # BLD AUTO: 0.2 K/UL (ref 0–0.5)
EOSINOPHIL NFR BLD: 2.1 % (ref 0–8)
ERYTHROCYTE [DISTWIDTH] IN BLOOD BY AUTOMATED COUNT: 13.3 % (ref 11.5–14.5)
EST. GFR  (NO RACE VARIABLE): 49.1 ML/MIN/1.73 M^2
EST. GFR  (NO RACE VARIABLE): 49.1 ML/MIN/1.73 M^2
GLUCOSE SERPL-MCNC: 147 MG/DL (ref 70–110)
HCT VFR BLD AUTO: 39.6 % (ref 40–54)
HGB BLD-MCNC: 13 G/DL (ref 14–18)
IMM GRANULOCYTES # BLD AUTO: 0.04 K/UL (ref 0–0.04)
IMM GRANULOCYTES NFR BLD AUTO: 0.5 % (ref 0–0.5)
LYMPHOCYTES # BLD AUTO: 0.8 K/UL (ref 1–4.8)
LYMPHOCYTES NFR BLD: 10.5 % (ref 18–48)
MCH RBC QN AUTO: 28.5 PG (ref 27–31)
MCHC RBC AUTO-ENTMCNC: 32.8 G/DL (ref 32–36)
MCV RBC AUTO: 87 FL (ref 82–98)
MONOCYTES # BLD AUTO: 0.3 K/UL (ref 0.3–1)
MONOCYTES NFR BLD: 3.2 % (ref 4–15)
NEUTROPHILS # BLD AUTO: 6.6 K/UL (ref 1.8–7.7)
NEUTROPHILS NFR BLD: 83.2 % (ref 38–73)
NRBC BLD-RTO: 0 /100 WBC
OB PNL STL: POSITIVE
PLATELET # BLD AUTO: 362 K/UL (ref 150–450)
PMV BLD AUTO: 9 FL (ref 9.2–12.9)
POTASSIUM SERPL-SCNC: 3.4 MMOL/L (ref 3.5–5.1)
PROT SERPL-MCNC: 6.6 G/DL (ref 6–8.4)
RBC # BLD AUTO: 4.56 M/UL (ref 4.6–6.2)
SODIUM SERPL-SCNC: 140 MMOL/L (ref 136–145)
VANCOMYCIN SERPL-MCNC: 9.7 UG/ML
WBC # BLD AUTO: 7.92 K/UL (ref 3.9–12.7)

## 2024-11-22 PROCEDURE — 87046 STOOL CULTR AEROBIC BACT EA: CPT | Performed by: STUDENT IN AN ORGANIZED HEALTH CARE EDUCATION/TRAINING PROGRAM

## 2024-11-22 PROCEDURE — 21400001 HC TELEMETRY ROOM

## 2024-11-22 PROCEDURE — 36415 COLL VENOUS BLD VENIPUNCTURE: CPT | Performed by: STUDENT IN AN ORGANIZED HEALTH CARE EDUCATION/TRAINING PROGRAM

## 2024-11-22 PROCEDURE — 83993 ASSAY FOR CALPROTECTIN FECAL: CPT | Performed by: STUDENT IN AN ORGANIZED HEALTH CARE EDUCATION/TRAINING PROGRAM

## 2024-11-22 PROCEDURE — 80053 COMPREHEN METABOLIC PANEL: CPT | Performed by: STUDENT IN AN ORGANIZED HEALTH CARE EDUCATION/TRAINING PROGRAM

## 2024-11-22 PROCEDURE — 25000003 PHARM REV CODE 250: Performed by: STUDENT IN AN ORGANIZED HEALTH CARE EDUCATION/TRAINING PROGRAM

## 2024-11-22 PROCEDURE — 82565 ASSAY OF CREATININE: CPT | Performed by: STUDENT IN AN ORGANIZED HEALTH CARE EDUCATION/TRAINING PROGRAM

## 2024-11-22 PROCEDURE — 87427 SHIGA-LIKE TOXIN AG IA: CPT | Mod: 59 | Performed by: STUDENT IN AN ORGANIZED HEALTH CARE EDUCATION/TRAINING PROGRAM

## 2024-11-22 PROCEDURE — 89055 LEUKOCYTE ASSESSMENT FECAL: CPT | Performed by: STUDENT IN AN ORGANIZED HEALTH CARE EDUCATION/TRAINING PROGRAM

## 2024-11-22 PROCEDURE — 87449 NOS EACH ORGANISM AG IA: CPT | Performed by: STUDENT IN AN ORGANIZED HEALTH CARE EDUCATION/TRAINING PROGRAM

## 2024-11-22 PROCEDURE — 25000003 PHARM REV CODE 250: Performed by: PHYSICIAN ASSISTANT

## 2024-11-22 PROCEDURE — 87177 OVA AND PARASITES SMEARS: CPT | Performed by: STUDENT IN AN ORGANIZED HEALTH CARE EDUCATION/TRAINING PROGRAM

## 2024-11-22 PROCEDURE — 87328 CRYPTOSPORIDIUM AG IA: CPT | Performed by: STUDENT IN AN ORGANIZED HEALTH CARE EDUCATION/TRAINING PROGRAM

## 2024-11-22 PROCEDURE — 87045 FECES CULTURE AEROBIC BACT: CPT | Performed by: STUDENT IN AN ORGANIZED HEALTH CARE EDUCATION/TRAINING PROGRAM

## 2024-11-22 PROCEDURE — 80202 ASSAY OF VANCOMYCIN: CPT | Performed by: STUDENT IN AN ORGANIZED HEALTH CARE EDUCATION/TRAINING PROGRAM

## 2024-11-22 PROCEDURE — 87425 ROTAVIRUS AG IA: CPT | Performed by: STUDENT IN AN ORGANIZED HEALTH CARE EDUCATION/TRAINING PROGRAM

## 2024-11-22 PROCEDURE — 63600175 PHARM REV CODE 636 W HCPCS: Performed by: STUDENT IN AN ORGANIZED HEALTH CARE EDUCATION/TRAINING PROGRAM

## 2024-11-22 PROCEDURE — 82272 OCCULT BLD FECES 1-3 TESTS: CPT | Performed by: STUDENT IN AN ORGANIZED HEALTH CARE EDUCATION/TRAINING PROGRAM

## 2024-11-22 PROCEDURE — 63600175 PHARM REV CODE 636 W HCPCS: Performed by: PHYSICIAN ASSISTANT

## 2024-11-22 PROCEDURE — 25000003 PHARM REV CODE 250

## 2024-11-22 PROCEDURE — 87338 HPYLORI STOOL AG IA: CPT | Performed by: STUDENT IN AN ORGANIZED HEALTH CARE EDUCATION/TRAINING PROGRAM

## 2024-11-22 PROCEDURE — 85025 COMPLETE CBC W/AUTO DIFF WBC: CPT | Performed by: STUDENT IN AN ORGANIZED HEALTH CARE EDUCATION/TRAINING PROGRAM

## 2024-11-22 PROCEDURE — 82705 FATS/LIPIDS FECES QUAL: CPT | Performed by: STUDENT IN AN ORGANIZED HEALTH CARE EDUCATION/TRAINING PROGRAM

## 2024-11-22 RX ADMIN — Medication 300 ML: at 10:11

## 2024-11-22 RX ADMIN — ENOXAPARIN SODIUM 40 MG: 40 INJECTION SUBCUTANEOUS at 04:11

## 2024-11-22 RX ADMIN — POTASSIUM BICARBONATE 25 MEQ: 978 TABLET, EFFERVESCENT ORAL at 09:11

## 2024-11-22 RX ADMIN — Medication 1 CAPSULE: at 08:11

## 2024-11-22 RX ADMIN — VANCOMYCIN HYDROCHLORIDE 1500 MG: 1.5 INJECTION, POWDER, LYOPHILIZED, FOR SOLUTION INTRAVENOUS at 08:11

## 2024-11-22 RX ADMIN — GUAIFENESIN AND DEXTROMETHORPHAN HYDROBROMIDE 2 TABLET: 600; 30 TABLET, EXTENDED RELEASE ORAL at 10:11

## 2024-11-22 RX ADMIN — Medication 300 ML: at 11:11

## 2024-11-22 RX ADMIN — PIPERACILLIN SODIUM AND TAZOBACTAM SODIUM 4.5 G: 4; .5 INJECTION, POWDER, FOR SOLUTION INTRAVENOUS at 06:11

## 2024-11-22 RX ADMIN — Medication 300 ML: at 08:11

## 2024-11-22 RX ADMIN — GUAIFENESIN AND DEXTROMETHORPHAN HYDROBROMIDE 2 TABLET: 600; 30 TABLET, EXTENDED RELEASE ORAL at 08:11

## 2024-11-22 RX ADMIN — Medication 6 MG: at 09:11

## 2024-11-22 RX ADMIN — Medication 300 ML: at 06:11

## 2024-11-22 RX ADMIN — ACETAMINOPHEN 650 MG: 325 TABLET ORAL at 04:11

## 2024-11-22 RX ADMIN — Medication 300 ML: at 04:11

## 2024-11-22 RX ADMIN — ACETAMINOPHEN 650 MG: 325 TABLET ORAL at 09:11

## 2024-11-22 RX ADMIN — POTASSIUM BICARBONATE 25 MEQ: 978 TABLET, EFFERVESCENT ORAL at 11:11

## 2024-11-22 RX ADMIN — FLUTICASONE PROPIONATE 100 MCG: 50 SPRAY, METERED NASAL at 08:11

## 2024-11-22 RX ADMIN — PIPERACILLIN SODIUM AND TAZOBACTAM SODIUM 4.5 G: 4; .5 INJECTION, POWDER, FOR SOLUTION INTRAVENOUS at 10:11

## 2024-11-22 NOTE — ASSESSMENT & PLAN NOTE
Patient's most recent potassium results are listed below.   Recent Labs     11/20/24  0308 11/21/24  1024 11/22/24  0913   K 3.5 3.4* 3.4*     Plan  - Replete potassium per protocol  - Monitor potassium Daily  - Patient's hypokalemia is stable

## 2024-11-22 NOTE — PROGRESS NOTES
"[facility]  Progress Note    Patient Name: Gentry Dowling  MRN: [id number]  Patient Class: IP- Inpatient   Admission Date: 11/17/2024  Length of Stay: 5 days  Attending Physician: Barrett Reno MD  Primary Care Provider: Kishore Erickson MD        Subjective:     Principal Problem:Sepsis without acute organ dysfunction        HPI:  Gentry Dowling is a 48 y.o. male with a PMHx of obesity who presents to [facility] for evaluation of fever. Patient reports fever with associated fatigue, poor appetite, mailase and feeling generally unwell for the past week. He reports mild cough, intermittent right lower quadrant "gas pain" for the past few days. He notices fever usually occurs in the late afternoon which causes difficulty sleeping. He thinks he fell in his sleep and hit his head a few days ago as he woke up with a cut to his head and soreness to the R side of his body. Does not remember the fall and unsure if LOC. Reports intermittent headache which he feels is due to dehydration. Of note, patient reports sinus congestion and pain began about 1 month ago. His mother had similar symptoms about 1 month ago which have since resolved. Denies chest pain, LE swelling, rash, vision changes, N/V, diarrhea. He does mention that he has a family history of leukemia.     Overview/Hospital Course:  ED: Temp 102, , BP 93/63, vitals improved s/p 1L IVFs, tylenol and IV vanc and zosyn. Leukocytosis of WBC 13.6. UA non infectious. POC lactic WNL. CT c/a/p with Posterior right hepatic lobe heterogeneously enhancing/hypoenhancing area.  The abrupt diminished enhancement of posterior right portal vein raises suspicion for thrombosis and associated hepatic infarction.  Other underlying etiology including infection/inflammation not excluded.   Ultrasound negative for portal vein thrombosis. MRI ordered. Pt developed LOGAN which improved with IVF and oral intake.  MRI revealing for pylephlebitis " of portal vessel. Given high mortality associated with this condition, close monitoring for bowel ischemia and other complications necessary and discussed with patient.  However, appropriate treatment ongoing, including treatment with Zosyn.  Given this finding being suggestive of intra-abdominal infection and associated symptoms being present, stool samples obtained.    Interval History: Findings on MRI discussed with patient and family thoroughly.  He continues to have some episodes of mild night sweats and a few episodes of nonbloody diarrhea daily but otherwise states feeling improvement. Remains afebrile and without marked abdominal pain.    Review of Systems   Constitutional:  Positive for diaphoresis. Negative for chills and fatigue.   HENT:  Negative for sinus pain and sore throat.    Respiratory:  Negative for cough and shortness of breath.    Cardiovascular:  Negative for chest pain and palpitations.   Gastrointestinal:  Positive for diarrhea. Negative for abdominal distention and vomiting.   Genitourinary:  Negative for dysuria and frequency.   Musculoskeletal:         Chronic right shoulder pain from known rotator's cuff tear and labrum injury   Skin:  Negative for color change.   Psychiatric/Behavioral:  Negative for behavioral problems and confusion.      Objective:     Vital Signs (Most Recent):  Temp: 98.1 °F (36.7 °C) (11/22/24 1142)  Pulse: 78 (11/22/24 1142)  Resp: 18 (11/22/24 1142)  BP: 117/80 (11/22/24 1142)  SpO2: 95 % (11/22/24 1142) Vital Signs (24h Range):  Temp:  [97.8 °F (36.6 °C)-98.5 °F (36.9 °C)] 98.1 °F (36.7 °C)  Pulse:  [72-94] 78  Resp:  [17-19] 18  SpO2:  [95 %-99 %] 95 %  BP: (117-155)/(79-91) 117/80     Weight: 111.1 kg (245 lb)  Body mass index is 35.15 kg/m².    Intake/Output Summary (Last 24 hours) at 11/22/2024 1216  Last data filed at 11/22/2024 1055  Gross per 24 hour   Intake 1200 ml   Output --   Net 1200 ml         Physical Exam  Constitutional:       Appearance: He is  not toxic-appearing.   Eyes:      General: No scleral icterus.     Conjunctiva/sclera: Conjunctivae normal.   Cardiovascular:      Rate and Rhythm: Normal rate and regular rhythm.   Pulmonary:      Effort: Pulmonary effort is normal.      Breath sounds: Normal breath sounds.   Abdominal:      Palpations: Abdomen is soft.      Tenderness: There is no abdominal tenderness. There is no guarding.   Musculoskeletal:      Right lower leg: No edema.      Left lower leg: No edema.   Skin:     Coloration: Skin is not jaundiced.   Neurological:      General: No focal deficit present.      Mental Status: He is alert and oriented to person, place, and time.   Psychiatric:         Mood and Affect: Mood normal.         Behavior: Behavior normal.             Significant Labs: All pertinent labs within the past 24 hours have been reviewed.  CBC:   Recent Labs   Lab 11/22/24  0913   WBC 7.92   HGB 13.0*   HCT 39.6*        CMP:   Recent Labs   Lab 11/21/24  1024 11/21/24  1135 11/22/24  0348 11/22/24  0913     --   --  140   K 3.4*  --   --  3.4*     --   --  106   CO2 29  --   --  24   GLU 90  --   --  147*   BUN 12  --   --  14   CREATININE 1.9*  --  1.7* 1.7*   CALCIUM 9.0  --   --  8.9   PROT  --  6.5  --  6.6   ALBUMIN  --  2.4*  --  2.4*   BILITOT  --  0.3  --  0.4   ALKPHOS  --  93  --  94   AST  --  26  --  27   ALT  --  47*  --  65*   ANIONGAP 8  --   --  10       Significant Imaging: MRI Abdomen with and without contrast 11/21/24:  Impression:     Branching tubular areas of hypoenhancement in the right posterior liver with adjacent edema, concerning for pylephlebitis.  No abscess.  Infiltrative neoplasm is less likely.    Assessment/Plan:      * Sepsis without acute organ dysfunction  Fever of unknown origin  - febrile with tachycardia and leukocytosis on admit  - lactic WNL  - source unclear- UA, CXR unremarkable  - COVID/flu negative; HIV neg  - CTH negative  - CT chest/abd/pelvis ordered - Posterior  right hepatic lobe heterogeneously enhancing/hypoenhancing area. The abrupt diminished enhancement of posterior right portal vein raises suspicion for thrombosis and associated hepatic infarction.  Other underlying etiology including infection/inflammation not excluded.    Symmetric bilateral perinephric fat stranding with unknown chronicity.  Urinalysis can be attempted to rule out infectious process.   Mild hepatosplenomegaly.   - Obtain ultrasound - negative for thrombosis. Heterogeneous hypoechoic lesion near the dome of the right hepatic lobe posteriorly.  A mass is not excluded.  MRI ordered and evident of some edema around liver without abscess and pylephlebitis, which hints at intra-abdominal infection: continue Vancomycin, Zosyn; no anticoagulation required; obtaining stool studies to assess for culprit; considering Infectious Diseases consult once samples return but feel confident in Zosyn; monitoring liver function closely and signs/symptoms of infarct in any bowel region; consult to Interventional Radiology for potential drainage of region (may be limited by lack of abscess) for sample evaluation  - continue BS IV vanc and zosyn for now  - follow blood cx - ngtd (taken 11/17)  - no photophobia or neck rigidity, had joint injections but roughly 8 years ago, no rash, pulmonary symptoms, murmur, or sinus discomfort; difficult to ascertain cause at this stage but improving overall  - will continue to monitor progress and obtain additional labs as appropriate  - encourage oral hydration    Hypokalemia  Patient's most recent potassium results are listed below.   Recent Labs     11/20/24  0308 11/21/24  1024 11/22/24  0913   K 3.5 3.4* 3.4*     Plan  - Replete potassium per protocol  - Monitor potassium Daily  - Patient's hypokalemia is stable    LOGAN (acute kidney injury)  LOGAN is likely due to pre-renal azotemia due to dehydration and ?contrast . Baseline creatinine is  unknown . Most recent creatinine and eGFR  are listed below.  Recent Labs     11/21/24  1024 11/22/24  0348 11/22/24  0913   CREATININE 1.9* 1.7* 1.7*   EGFRNORACEVR 43.0* 49.1* 49.1*        Plan  - LOGAN is improving  - Avoid nephrotoxins and renally dose meds for GFR listed above  - Monitor urine output, serial BMP, and adjust therapy as needed; will review urine studies and FENA    Head trauma  - reports fall which possibly happened in his sleep (?) w/ head trauma, unknown LOC  - will check CTH given headaches, can also eval sinuses - negative  - neuro checks q4h  - alert and fully oriented      VTE Risk Mitigation (From admission, onward)           Ordered     enoxaparin injection 40 mg  Daily         11/17/24 2232     IP VTE HIGH RISK PATIENT  Once         11/17/24 2232                    Discharge Planning   PRESTON: 11/23/2024     Code Status: Full Code   Is the patient medically ready for discharge?: No  Continued IV antibiotics, search of source and nature of infection    Reason for patient still in hospital (select all that apply): Patient trending condition  Discharge Plan A: Home with family          Barrett Reno MD  Department of Hospital Medicine   [facility] Telemetry

## 2024-11-22 NOTE — ASSESSMENT & PLAN NOTE
LOGAN is likely due to pre-renal azotemia due to dehydration and ?contrast . Baseline creatinine is  unknown . Most recent creatinine and eGFR are listed below.  Recent Labs     11/21/24  1024 11/22/24  0348 11/22/24  0913   CREATININE 1.9* 1.7* 1.7*   EGFRNORACEVR 43.0* 49.1* 49.1*        Plan  - LOGAN is improving  - Avoid nephrotoxins and renally dose meds for GFR listed above  - Monitor urine output, serial BMP, and adjust therapy as needed; will review urine studies and FENA

## 2024-11-22 NOTE — ASSESSMENT & PLAN NOTE
Fever of unknown origin  - febrile with tachycardia and leukocytosis on admit  - lactic WNL  - source unclear- UA, CXR unremarkable  - COVID/flu negative; HIV neg  - CTH negative  - CT chest/abd/pelvis ordered - Posterior right hepatic lobe heterogeneously enhancing/hypoenhancing area. The abrupt diminished enhancement of posterior right portal vein raises suspicion for thrombosis and associated hepatic infarction.  Other underlying etiology including infection/inflammation not excluded.    Symmetric bilateral perinephric fat stranding with unknown chronicity.  Urinalysis can be attempted to rule out infectious process.   Mild hepatosplenomegaly.   - Obtain ultrasound - negative for thrombosis. Heterogeneous hypoechoic lesion near the dome of the right hepatic lobe posteriorly.  A mass is not excluded.  MRI ordered and evident of some edema around liver without abscess and pylephlebitis, which hints at intra-abdominal infection: continue Vancomycin, Zosyn; no anticoagulation required; obtaining stool studies to assess for culprit; considering Infectious Diseases consult once samples return but feel confident in Zosyn; monitoring liver function closely and signs/symptoms of infarct in any bowel region; consult to Interventional Radiology for potential drainage of region (may be limited by lack of abscess) for sample evaluation  - continue BS IV vanc and zosyn for now  - follow blood cx - ngtd (taken 11/17)  - no photophobia or neck rigidity, had joint injections but roughly 8 years ago, no rash, pulmonary symptoms, murmur, or sinus discomfort; difficult to ascertain cause at this stage but improving overall  - will continue to monitor progress and obtain additional labs as appropriate  - encourage oral hydration

## 2024-11-22 NOTE — SUBJECTIVE & OBJECTIVE
Interval History: Findings on MRI discussed with patient and family thoroughly.  He continues to have some episodes of mild night sweats and a few episodes of nonbloody diarrhea daily but otherwise states feeling improvement. Remains afebrile and without marked abdominal pain.    Review of Systems   Constitutional:  Positive for diaphoresis. Negative for chills and fatigue.   HENT:  Negative for sinus pain and sore throat.    Respiratory:  Negative for cough and shortness of breath.    Cardiovascular:  Negative for chest pain and palpitations.   Gastrointestinal:  Positive for diarrhea. Negative for abdominal distention and vomiting.   Genitourinary:  Negative for dysuria and frequency.   Musculoskeletal:         Chronic right shoulder pain from known rotator's cuff tear and labrum injury   Skin:  Negative for color change.   Psychiatric/Behavioral:  Negative for behavioral problems and confusion.      Objective:     Vital Signs (Most Recent):  Temp: 98.1 °F (36.7 °C) (11/22/24 1142)  Pulse: 78 (11/22/24 1142)  Resp: 18 (11/22/24 1142)  BP: 117/80 (11/22/24 1142)  SpO2: 95 % (11/22/24 1142) Vital Signs (24h Range):  Temp:  [97.8 °F (36.6 °C)-98.5 °F (36.9 °C)] 98.1 °F (36.7 °C)  Pulse:  [72-94] 78  Resp:  [17-19] 18  SpO2:  [95 %-99 %] 95 %  BP: (117-155)/(79-91) 117/80     Weight: 111.1 kg (245 lb)  Body mass index is 35.15 kg/m².    Intake/Output Summary (Last 24 hours) at 11/22/2024 1216  Last data filed at 11/22/2024 1055  Gross per 24 hour   Intake 1200 ml   Output --   Net 1200 ml         Physical Exam  Constitutional:       Appearance: He is not toxic-appearing.   Eyes:      General: No scleral icterus.     Conjunctiva/sclera: Conjunctivae normal.   Cardiovascular:      Rate and Rhythm: Normal rate and regular rhythm.   Pulmonary:      Effort: Pulmonary effort is normal.      Breath sounds: Normal breath sounds.   Abdominal:      Palpations: Abdomen is soft.      Tenderness: There is no abdominal tenderness.  There is no guarding.   Musculoskeletal:      Right lower leg: No edema.      Left lower leg: No edema.   Skin:     Coloration: Skin is not jaundiced.   Neurological:      General: No focal deficit present.      Mental Status: He is alert and oriented to person, place, and time.   Psychiatric:         Mood and Affect: Mood normal.         Behavior: Behavior normal.             Significant Labs: All pertinent labs within the past 24 hours have been reviewed.  CBC:   Recent Labs   Lab 11/22/24  0913   WBC 7.92   HGB 13.0*   HCT 39.6*        CMP:   Recent Labs   Lab 11/21/24  1024 11/21/24  1135 11/22/24  0348 11/22/24  0913     --   --  140   K 3.4*  --   --  3.4*     --   --  106   CO2 29  --   --  24   GLU 90  --   --  147*   BUN 12  --   --  14   CREATININE 1.9*  --  1.7* 1.7*   CALCIUM 9.0  --   --  8.9   PROT  --  6.5  --  6.6   ALBUMIN  --  2.4*  --  2.4*   BILITOT  --  0.3  --  0.4   ALKPHOS  --  93  --  94   AST  --  26  --  27   ALT  --  47*  --  65*   ANIONGAP 8  --   --  10       Significant Imaging: MRI Abdomen with and without contrast 11/21/24:  Impression:     Branching tubular areas of hypoenhancement in the right posterior liver with adjacent edema, concerning for pylephlebitis.  No abscess.  Infiltrative neoplasm is less likely.

## 2024-11-22 NOTE — PROGRESS NOTES
Pharmacokinetic Assessment Follow Up: IV Vancomycin    Vancomycin serum concentration assessment(s):    The random level was drawn correctly and can be used to guide therapy at this time. The measurement is below the desired definitive target range of 10 to 20 mcg/mL.    Vancomycin Regimen Plan:    Plan to c/w pulse dosing d/t LOGAN  Will plan to give a single 1.5 g IV vancomycin dose today then follow up with another random level drawn w/ 11/23 AM labs  Encourage daily labs while patient is on vancomycin - ordered scr w/ 11/23 random vancomycin level to trend    Drug levels (last 3 results):  Recent Labs   Lab Result Units 11/20/24  0308 11/21/24  0323 11/22/24  0349   Vancomycin, Random ug/mL 9.5 9.8 9.7     Pharmacy will continue to follow and monitor vancomycin.    Please contact pharmacy at extension [phone] for questions regarding this assessment.    Thank you for the consult,   Nawaf Tompkins, PharmD, [facility]      Patient brief summary:  Gentry Dowling is a 48 y.o. male initiated on antimicrobial therapy with IV Vancomycin for treatment of sepsis    Drug Allergies:   Review of patient's allergies indicates:  No Known Allergies    Actual Body Weight:   111.1 kg    Renal Function:   Estimated Creatinine Clearance: 66.3 mL/min (A) (based on SCr of 1.7 mg/dL (H)).,     Dialysis Method (if applicable):  N/A    CBC (last 72 hours):  Recent Labs   Lab Result Units 11/20/24  0308   WBC K/uL 9.90   Hemoglobin g/dL 11.8*   Hematocrit % 36.1*   Platelets K/uL 321   Gran % % 79.8*   Lymph % % 12.1*   Mono % % 5.7   Eosinophil % % 1.3   Basophil % % 0.4   Differential Method  Automated       Metabolic Panel (last 72 hours):  Recent Labs   Lab Result Units 11/19/24  1143 11/20/24  0308 11/21/24  1024 11/21/24  1135 11/22/24  0348   Sodium mmol/L  --  143 143  --   --    Sodium, Urine mmol/L 37  --   --   --   --    Potassium mmol/L  --  3.5 3.4*  --   --    Chloride mmol/L  --  105 106  --   --    CO2 mmol/L  --  27 29  --   --     Glucose mg/dL  --  97 90  --   --    BUN mg/dL  --  16 12  --   --    Creatinine mg/dL  --  2.5* 1.9*  --  1.7*   Creatinine, Urine mg/dL 100.0  100.0  --   --   --   --    Albumin g/dL  --   --   --  2.4*  --    Total Bilirubin mg/dL  --   --   --  0.3  --    Alkaline Phosphatase U/L  --   --   --  93  --    AST U/L  --   --   --  26  --    ALT U/L  --   --   --  47*  --    Magnesium mg/dL  --  2.4  --   --   --    Phosphorus mg/dL  --  4.6*  --   --   --        Vancomycin Administrations:  vancomycin given in the last 96 hours                     vancomycin 1,250 mg in 0.9% NaCl 250 mL IVPB (admixture device) (mg) 1,250 mg New Bag 11/21/24 0911    vancomycin (VANCOCIN) 1,000 mg in D5W 250 mL IVPB (admixture device) (mg) 1,000 mg New Bag 11/20/24 0908    vancomycin 750 mg in D5W 250 mL IVPB (admixture device) (mg) 750 mg New Bag 11/18/24 1100                    Microbiologic Results:  Microbiology Results (last 7 days)       Procedure Component Value Units Date/Time    Blood culture #1 **CANNOT BE ORDERED STAT** [[id number]] Collected: 11/17/24 1749    Order Status: Completed Specimen: Blood from Peripheral, Upper Arm, Right Updated: 11/21/24 2012     Blood Culture, Routine No Growth to date      No Growth to date      No Growth to date      No Growth to date      No Growth to date    Blood culture #2 **CANNOT BE ORDERED STAT** [[id number]] Collected: 11/17/24 1749    Order Status: Completed Specimen: Blood from Peripheral, Upper Arm, Right Updated: 11/21/24 2012     Blood Culture, Routine No Growth to date      No Growth to date      No Growth to date      No Growth to date      No Growth to date    Influenza A & B by Molecular [[id number]] Collected: 11/17/24 1721    Order Status: Completed Specimen: Nasopharyngeal Swab Updated: 11/17/24 1756     Influenza A, Molecular Negative     Influenza B, Molecular Negative     Flu A & B Source Nasal swab

## 2024-11-22 NOTE — PLAN OF CARE
Aware of finding on MRI of potential pylephlebitis.  Appropriate broad spectrum antibiotic regimen already established and patient improving clinically from likely intra-abdominal infection.  No role for anticoagulation.  In event of any sgns of deterioration, Infectious Diseases and/or Surgery involvement to be considered.      Barrett Reno MD  [facility]

## 2024-11-23 LAB
ALBUMIN SERPL BCP-MCNC: 2.6 G/DL (ref 3.5–5.2)
ALP SERPL-CCNC: 97 U/L (ref 40–150)
ALT SERPL W/O P-5'-P-CCNC: 68 U/L (ref 10–44)
ANION GAP SERPL CALC-SCNC: 8 MMOL/L (ref 8–16)
AST SERPL-CCNC: 31 U/L (ref 10–40)
BASOPHILS # BLD AUTO: 0.08 K/UL (ref 0–0.2)
BASOPHILS NFR BLD: 0.9 % (ref 0–1.9)
BILIRUB SERPL-MCNC: 0.4 MG/DL (ref 0.1–1)
BUN SERPL-MCNC: 15 MG/DL (ref 6–20)
CALCIUM SERPL-MCNC: 9.2 MG/DL (ref 8.7–10.5)
CHLORIDE SERPL-SCNC: 105 MMOL/L (ref 95–110)
CO2 SERPL-SCNC: 31 MMOL/L (ref 23–29)
CREAT SERPL-MCNC: 1.6 MG/DL (ref 0.5–1.4)
CREAT SERPL-MCNC: 1.6 MG/DL (ref 0.5–1.4)
CRYPTOSP AG STL QL IA: NEGATIVE
DIFFERENTIAL METHOD BLD: ABNORMAL
EOSINOPHIL # BLD AUTO: 0.2 K/UL (ref 0–0.5)
EOSINOPHIL NFR BLD: 2.2 % (ref 0–8)
ERYTHROCYTE [DISTWIDTH] IN BLOOD BY AUTOMATED COUNT: 13.1 % (ref 11.5–14.5)
EST. GFR  (NO RACE VARIABLE): 52.8 ML/MIN/1.73 M^2
EST. GFR  (NO RACE VARIABLE): 52.8 ML/MIN/1.73 M^2
G LAMBLIA AG STL QL IA: NEGATIVE
GLUCOSE SERPL-MCNC: 101 MG/DL (ref 70–110)
HAV IGM SERPL QL IA: NORMAL
HBV CORE IGM SERPL QL IA: NORMAL
HBV SURFACE AG SERPL QL IA: NORMAL
HCT VFR BLD AUTO: 41.2 % (ref 40–54)
HCV AB SERPL QL IA: NORMAL
HGB BLD-MCNC: 13.1 G/DL (ref 14–18)
IMM GRANULOCYTES # BLD AUTO: 0.04 K/UL (ref 0–0.04)
IMM GRANULOCYTES NFR BLD AUTO: 0.5 % (ref 0–0.5)
LYMPHOCYTES # BLD AUTO: 1 K/UL (ref 1–4.8)
LYMPHOCYTES NFR BLD: 11 % (ref 18–48)
MCH RBC QN AUTO: 28.5 PG (ref 27–31)
MCHC RBC AUTO-ENTMCNC: 31.8 G/DL (ref 32–36)
MCV RBC AUTO: 90 FL (ref 82–98)
MONOCYTES # BLD AUTO: 0.4 K/UL (ref 0.3–1)
MONOCYTES NFR BLD: 4 % (ref 4–15)
NEUTROPHILS # BLD AUTO: 7.1 K/UL (ref 1.8–7.7)
NEUTROPHILS NFR BLD: 81.4 % (ref 38–73)
NRBC BLD-RTO: 0 /100 WBC
PLATELET # BLD AUTO: 426 K/UL (ref 150–450)
PMV BLD AUTO: 9.2 FL (ref 9.2–12.9)
POTASSIUM SERPL-SCNC: 3.6 MMOL/L (ref 3.5–5.1)
PROT SERPL-MCNC: 7 G/DL (ref 6–8.4)
RBC # BLD AUTO: 4.59 M/UL (ref 4.6–6.2)
RV AG STL QL IA.RAPID: NEGATIVE
SODIUM SERPL-SCNC: 144 MMOL/L (ref 136–145)
VANCOMYCIN SERPL-MCNC: 10.6 UG/ML
WBC # BLD AUTO: 8.69 K/UL (ref 3.9–12.7)
WBC #/AREA STL HPF: NORMAL /[HPF]

## 2024-11-23 PROCEDURE — 21400001 HC TELEMETRY ROOM

## 2024-11-23 PROCEDURE — 63600175 PHARM REV CODE 636 W HCPCS: Performed by: PHYSICIAN ASSISTANT

## 2024-11-23 PROCEDURE — 36415 COLL VENOUS BLD VENIPUNCTURE: CPT | Performed by: STUDENT IN AN ORGANIZED HEALTH CARE EDUCATION/TRAINING PROGRAM

## 2024-11-23 PROCEDURE — 80074 ACUTE HEPATITIS PANEL: CPT | Performed by: STUDENT IN AN ORGANIZED HEALTH CARE EDUCATION/TRAINING PROGRAM

## 2024-11-23 PROCEDURE — 25000003 PHARM REV CODE 250: Performed by: STUDENT IN AN ORGANIZED HEALTH CARE EDUCATION/TRAINING PROGRAM

## 2024-11-23 PROCEDURE — 25000003 PHARM REV CODE 250: Performed by: PHYSICIAN ASSISTANT

## 2024-11-23 PROCEDURE — 85025 COMPLETE CBC W/AUTO DIFF WBC: CPT | Performed by: STUDENT IN AN ORGANIZED HEALTH CARE EDUCATION/TRAINING PROGRAM

## 2024-11-23 PROCEDURE — 82565 ASSAY OF CREATININE: CPT | Performed by: STUDENT IN AN ORGANIZED HEALTH CARE EDUCATION/TRAINING PROGRAM

## 2024-11-23 PROCEDURE — 80202 ASSAY OF VANCOMYCIN: CPT | Performed by: STUDENT IN AN ORGANIZED HEALTH CARE EDUCATION/TRAINING PROGRAM

## 2024-11-23 PROCEDURE — 63600175 PHARM REV CODE 636 W HCPCS

## 2024-11-23 PROCEDURE — 80053 COMPREHEN METABOLIC PANEL: CPT | Performed by: STUDENT IN AN ORGANIZED HEALTH CARE EDUCATION/TRAINING PROGRAM

## 2024-11-23 PROCEDURE — 25000003 PHARM REV CODE 250

## 2024-11-23 RX ORDER — ENOXAPARIN SODIUM 100 MG/ML
30 INJECTION SUBCUTANEOUS EVERY 24 HOURS
Status: DISCONTINUED | OUTPATIENT
Start: 2024-11-23 | End: 2024-11-27 | Stop reason: HOSPADM

## 2024-11-23 RX ADMIN — GUAIFENESIN AND DEXTROMETHORPHAN HYDROBROMIDE 2 TABLET: 600; 30 TABLET, EXTENDED RELEASE ORAL at 08:11

## 2024-11-23 RX ADMIN — PIPERACILLIN SODIUM AND TAZOBACTAM SODIUM 4.5 G: 4; .5 INJECTION, POWDER, FOR SOLUTION INTRAVENOUS at 02:11

## 2024-11-23 RX ADMIN — Medication 300 ML: at 02:11

## 2024-11-23 RX ADMIN — Medication 300 ML: at 10:11

## 2024-11-23 RX ADMIN — Medication 300 ML: at 09:11

## 2024-11-23 RX ADMIN — VANCOMYCIN HYDROCHLORIDE 1500 MG: 1.5 INJECTION, POWDER, LYOPHILIZED, FOR SOLUTION INTRAVENOUS at 08:11

## 2024-11-23 RX ADMIN — Medication 6 MG: at 08:11

## 2024-11-23 RX ADMIN — FLUTICASONE PROPIONATE 100 MCG: 50 SPRAY, METERED NASAL at 09:11

## 2024-11-23 RX ADMIN — Medication 1 CAPSULE: at 09:11

## 2024-11-23 RX ADMIN — Medication 300 ML: at 11:11

## 2024-11-23 RX ADMIN — Medication 300 ML: at 04:11

## 2024-11-23 RX ADMIN — Medication 300 ML: at 08:11

## 2024-11-23 RX ADMIN — GUAIFENESIN AND DEXTROMETHORPHAN HYDROBROMIDE 2 TABLET: 600; 30 TABLET, EXTENDED RELEASE ORAL at 09:11

## 2024-11-23 RX ADMIN — PIPERACILLIN SODIUM AND TAZOBACTAM SODIUM 4.5 G: 4; .5 INJECTION, POWDER, FOR SOLUTION INTRAVENOUS at 10:11

## 2024-11-23 NOTE — SUBJECTIVE & OBJECTIVE
Interval History: Patient remains afebrile and endorses feeling better than any other time during hospitalization.  He states that the night diaphoresis did not occur and that he slept very well.  He continues to have 3 episodes of nonbloody diarrhea daily.  He denies abdominal pain, nausea, and vomiting.    Review of Systems   Constitutional:  Negative for diaphoresis and fever.   HENT:  Negative for ear pain and sore throat.    Respiratory:  Negative for cough and shortness of breath.    Cardiovascular:  Negative for chest pain and palpitations.   Gastrointestinal:  Negative for abdominal pain, anal bleeding, blood in stool, diarrhea and nausea.   Musculoskeletal:         Chronic right shoulder pain, unchanged   Skin:  Negative for color change.   Neurological:  Negative for dizziness and headaches.     Objective:     Vital Signs (Most Recent):  Temp: 97.8 °F (36.6 °C) (11/23/24 0739)  Pulse: 71 (11/23/24 1157)  Resp: 18 (11/23/24 0739)  BP: 117/85 (11/23/24 0739)  SpO2: 95 % (11/23/24 0739) Vital Signs (24h Range):  Temp:  [97.5 °F (36.4 °C)-98.2 °F (36.8 °C)] 97.8 °F (36.6 °C)  Pulse:  [71-95] 71  Resp:  [18] 18  SpO2:  [94 %-97 %] 95 %  BP: (117-141)/(82-89) 117/85     Weight: 109.9 kg (242 lb 4.6 oz)  Body mass index is 34.76 kg/m².    Intake/Output Summary (Last 24 hours) at 11/23/2024 1210  Last data filed at 11/23/2024 0917  Gross per 24 hour   Intake 1820 ml   Output --   Net 1820 ml         Physical Exam  Constitutional:       Appearance: He is not toxic-appearing.   Eyes:      General: No scleral icterus.  Cardiovascular:      Rate and Rhythm: Normal rate and regular rhythm.   Pulmonary:      Effort: Pulmonary effort is normal.      Breath sounds: Normal breath sounds.   Abdominal:      General: Bowel sounds are normal. There is no distension.      Tenderness: There is no abdominal tenderness. There is no guarding.   Musculoskeletal:      Right lower leg: No edema.      Left lower leg: No edema.    Skin:     Coloration: Skin is not jaundiced.   Neurological:      General: No focal deficit present.      Mental Status: He is alert and oriented to person, place, and time.   Psychiatric:         Mood and Affect: Mood normal.         Behavior: Behavior normal.             Significant Labs: All pertinent labs within the past 24 hours have been reviewed.  CBC:   Recent Labs   Lab 11/22/24  0913 11/23/24  1009   WBC 7.92 8.69   HGB 13.0* 13.1*   HCT 39.6* 41.2    426     CMP:   Recent Labs   Lab 11/22/24  0913 11/23/24  0459 11/23/24  1009     --  144   K 3.4*  --  3.6     --  105   CO2 24  --  31*   *  --  101   BUN 14  --  15   CREATININE 1.7* 1.6* 1.6*   CALCIUM 8.9  --  9.2   PROT 6.6  --  7.0   ALBUMIN 2.4*  --  2.6*   BILITOT 0.4  --  0.4   ALKPHOS 94  --  97   AST 27  --  31   ALT 65*  --  68*   ANIONGAP 10  --  8         Significant Imaging: MRI Abdomen with and without contrast 11/21/24:  Impression:     Branching tubular areas of hypoenhancement in the right posterior liver with adjacent edema, concerning for pylephlebitis.  No abscess.  Infiltrative neoplasm is less likely.

## 2024-11-23 NOTE — PROGRESS NOTES
"[facility]  Progress Note    Patient Name: Gentry Dowling  MRN: [id number]  Patient Class: IP- Inpatient   Admission Date: 11/17/2024  Length of Stay: 6 days  Attending Physician: Barrett Reno MD  Primary Care Provider: Kishore Erickson MD        Subjective:     Principal Problem:Sepsis without acute organ dysfunction        HPI:  Gentry Dowling is a 48 y.o. male with a PMHx of obesity who [place] [facility]. Patient reports fever with associated fatigue, poor appetite, mailase and feeling generally unwell for the past week. He reports mild cough, intermittent right lower quadrant "gas pain" for the past few days. He notices fever usually occurs in the late afternoon which causes difficulty sleeping. He thinks he fell in his sleep and hit his head a few days ago as he woke up with a cut to his head and soreness to the R side of his body. Does not remember the fall and unsure if LOC. Reports intermittent headache which he feels is due to dehydration. Of note, patient reports sinus congestion and pain began about 1 month ago. His mother had similar symptoms about 1 month ago which have since resolved. Denies chest pain, LE swelling, rash, vision changes, N/V, diarrhea. He does mention that he has a family history of leukemia.     Overview/Hospital Course:  ED: Temp 102, , BP 93/63, vitals improved s/p 1L IVFs, tylenol and IV vanc and zosyn. Leukocytosis of WBC 13.6. UA non infectious. POC lactic WNL. CT c/a/p with Posterior right hepatic lobe heterogeneously enhancing/hypoenhancing area.  The abrupt diminished enhancement of posterior right portal vein raises suspicion for thrombosis and associated hepatic infarction.  Other underlying etiology including infection/inflammation not excluded.   Ultrasound negative for portal vein thrombosis. MRI ordered. Pt developed LOGAN which improved with IVF and oral intake.  MRI revealing for pylephlebitis " of portal vessel. Given high mortality associated with this condition, close monitoring for bowel ischemia and other complications necessary and discussed with patient.  However, appropriate treatment ongoing, including treatment with Zosyn.  Given this finding being suggestive of intra-abdominal infection and associated symptoms being present, stool samples obtained and GI PCR panel.    Interval History: Patient remains afebrile and endorses feeling better than any other time during hospitalization.  He states that the night diaphoresis did not occur and that he slept very well.  He continues to have 3 episodes of nonbloody diarrhea daily.  He denies abdominal pain, nausea, and vomiting.    Review of Systems   Constitutional:  Negative for diaphoresis and fever.   HENT:  Negative for ear pain and sore throat.    Respiratory:  Negative for cough and shortness of breath.    Cardiovascular:  Negative for chest pain and palpitations.   Gastrointestinal:  Negative for abdominal pain, anal bleeding, blood in stool, diarrhea and nausea.   Musculoskeletal:         Chronic right shoulder pain, unchanged   Skin:  Negative for color change.   Neurological:  Negative for dizziness and headaches.     Objective:     Vital Signs (Most Recent):  Temp: 97.8 °F (36.6 °C) (11/23/24 0739)  Pulse: 71 (11/23/24 1157)  Resp: 18 (11/23/24 0739)  BP: 117/85 (11/23/24 0739)  SpO2: 95 % (11/23/24 0739) Vital Signs (24h Range):  Temp:  [97.5 °F (36.4 °C)-98.2 °F (36.8 °C)] 97.8 °F (36.6 °C)  Pulse:  [71-95] 71  Resp:  [18] 18  SpO2:  [94 %-97 %] 95 %  BP: (117-141)/(82-89) 117/85     Weight: 109.9 kg (242 lb 4.6 oz)  Body mass index is 34.76 kg/m².    Intake/Output Summary (Last 24 hours) at 11/23/2024 1210  Last data filed at 11/23/2024 0917  Gross per 24 hour   Intake 1820 ml   Output --   Net 1820 ml         Physical Exam  Constitutional:       Appearance: He is not toxic-appearing.   Eyes:      General: No scleral  icterus.  Cardiovascular:      Rate and Rhythm: Normal rate and regular rhythm.   Pulmonary:      Effort: Pulmonary effort is normal.      Breath sounds: Normal breath sounds.   Abdominal:      General: Bowel sounds are normal. There is no distension.      Tenderness: There is no abdominal tenderness. There is no guarding.   Musculoskeletal:      Right lower leg: No edema.      Left lower leg: No edema.   Skin:     Coloration: Skin is not jaundiced.   Neurological:      General: No focal deficit present.      Mental Status: He is alert and oriented to person, place, and time.   Psychiatric:         Mood and Affect: Mood normal.         Behavior: Behavior normal.             Significant Labs: All pertinent labs within the past 24 hours have been reviewed.  CBC:   Recent Labs   Lab 11/22/24  0913 11/23/24  1009   WBC 7.92 8.69   HGB 13.0* 13.1*   HCT 39.6* 41.2    426     CMP:   Recent Labs   Lab 11/22/24  0913 11/23/24  0459 11/23/24  1009     --  144   K 3.4*  --  3.6     --  105   CO2 24  --  31*   *  --  101   BUN 14  --  15   CREATININE 1.7* 1.6* 1.6*   CALCIUM 8.9  --  9.2   PROT 6.6  --  7.0   ALBUMIN 2.4*  --  2.6*   BILITOT 0.4  --  0.4   ALKPHOS 94  --  97   AST 27  --  31   ALT 65*  --  68*   ANIONGAP 10  --  8         Significant Imaging: MRI Abdomen with and without contrast 11/21/24:  Impression:     Branching tubular areas of hypoenhancement in the right posterior liver with adjacent edema, concerning for pylephlebitis.  No abscess.  Infiltrative neoplasm is less likely.       Assessment/Plan:      * Sepsis without acute organ dysfunction  Fever of unknown origin (abdominal/GI related)  - febrile with tachycardia and leukocytosis on admit  - lactic WNL  - source unclear- UA, CXR unremarkable  - COVID/flu negative; HIV neg  - CTH negative  - CT chest/abd/pelvis ordered - Posterior right hepatic lobe heterogeneously enhancing/hypoenhancing area. The abrupt diminished enhancement  of posterior right portal vein raises suspicion for thrombosis and associated hepatic infarction.  Other underlying etiology including infection/inflammation not excluded.    Symmetric bilateral perinephric fat stranding with unknown chronicity.  Urinalysis can be attempted to rule out infectious process.   Mild hepatosplenomegaly.   - Obtained ultrasound - negative for thrombosis. Heterogeneous hypoechoic lesion near the dome of the right hepatic lobe posteriorly.  A mass is not excluded.  MRI ordered and evident of some edema around liver without abscess and pylephlebitis, which hints at intra-abdominal infection: continue Vancomycin, Zosyn; no anticoagulation required; obtaining stool studies to assess for culprit as well as GI pathogen PCR panel; considering Infectious Diseases consult once samples return but feel confident in Zosyn; monitoring liver function closely and signs/symptoms of infarct in any bowel region; potential consult to Interventional Radiology for potential drainage of region (may be limited by lack of abscess) for sample evaluation but may wait for planned repeat MRI on 11/25  - continue BS IV vanc and zosyn for now  - no photophobia or neck rigidity, had joint injections but roughly 8 years ago, no rash, pulmonary symptoms, murmur, or sinus discomfort; difficult to ascertain cause at this stage but improving overall  - will continue to monitor progress and obtain additional labs as appropriate  - encourage oral hydration    Hypokalemia  Patient's most recent potassium results are listed below.   Recent Labs     11/20/24  0308 11/21/24  1024 11/22/24  0913   K 3.5 3.4* 3.4*     Plan  - Replete potassium per protocol  - Monitor potassium Daily  - Patient's hypokalemia is stable    LOGAN (acute kidney injury)  LOGAN is likely due to pre-renal azotemia due to dehydration and ?contrast . Baseline creatinine is  unknown . Most recent creatinine and eGFR are listed below.  Recent Labs      11/22/24  0913 11/23/24  0459 11/23/24  1009   CREATININE 1.7* 1.6* 1.6*   EGFRNORACEVR 49.1* 52.8* 52.8*        Plan  - LOGAN is improved and stable  - Avoid nephrotoxins and renally dose meds for GFR listed above  - Monitor urine output, serial BMP, and adjust therapy as needed; will review urine studies and FENA if worsens; Vanc/Zosyn combination adding to mild renal insult but benefits outweigh risks    Head trauma  - reports fall which possibly happened in his sleep (?) w/ head trauma, unknown LOC  - will check CTH given headaches, can also eval sinuses - negative  - neuro checks q4h now discontinued  - alert and fully oriented      VTE Risk Mitigation (From admission, onward)           Ordered     enoxaparin injection 30 mg  Daily         11/23/24 0729     IP VTE HIGH RISK PATIENT  Once         11/17/24 2232                    Discharge Planning   PRESTON: 11/25/2024     Code Status: Full Code   Is the patient medically ready for discharge?: No    Reason for patient still in hospital (select all that apply): Patient trending condition  Discharge Plan A: Home with family            Barrett Reno MD  Department of Hospital Medicine   [facility] - Internal Medicine Telemetry

## 2024-11-23 NOTE — ASSESSMENT & PLAN NOTE
Fever of unknown origin (abdominal/GI related)  - febrile with tachycardia and leukocytosis on admit  - lactic WNL  - source unclear- UA, CXR unremarkable  - COVID/flu negative; HIV neg  - CTH negative  - CT chest/abd/pelvis ordered - Posterior right hepatic lobe heterogeneously enhancing/hypoenhancing area. The abrupt diminished enhancement of posterior right portal vein raises suspicion for thrombosis and associated hepatic infarction.  Other underlying etiology including infection/inflammation not excluded.    Symmetric bilateral perinephric fat stranding with unknown chronicity.  Urinalysis can be attempted to rule out infectious process.   Mild hepatosplenomegaly.   - Obtained ultrasound - negative for thrombosis. Heterogeneous hypoechoic lesion near the dome of the right hepatic lobe posteriorly.  A mass is not excluded.  MRI ordered and evident of some edema around liver without abscess and pylephlebitis, which hints at intra-abdominal infection: continue Vancomycin, Zosyn; no anticoagulation required; obtaining stool studies to assess for culprit as well as GI pathogen PCR panel; considering Infectious Diseases consult once samples return but feel confident in Zosyn; monitoring liver function closely and signs/symptoms of infarct in any bowel region; potential consult to Interventional Radiology for potential drainage of region (may be limited by lack of abscess) for sample evaluation but may wait for planned repeat MRI on 11/25  - continue BS IV vanc and zosyn for now  - no photophobia or neck rigidity, had joint injections but roughly 8 years ago, no rash, pulmonary symptoms, murmur, or sinus discomfort; difficult to ascertain cause at this stage but improving overall  - will continue to monitor progress and obtain additional labs as appropriate  - encourage oral hydration

## 2024-11-23 NOTE — PLAN OF CARE
[facility] - Internal Medicine Telemetry  Discharge Reassessment    Primary Care Provider: Kishore Erickson MD    Expected Discharge Date: 11/26/2024    Reassessment (most recent)       Discharge Reassessment - 11/22/24 1515          Discharge Reassessment    Did the patient's condition or plan change since previous assessment? No (P)      Discharge Plan discussed with: Patient;Parent(s) (P)      Name(s) and Number(s) HUSSEIN HARPER (Mother)  [phone] (P)      Communicated PRESTON with patient/caregiver Yes (P)      Discharge Plan A Home with family (P)      Discharge Plan B Home (P)      DME Needed Upon Discharge  none (P)      Transition of Care Barriers None (P)      Why the patient remains in the hospital Requires continued medical care (P)         Post-Acute Status    [facility]                  Discharge Plan A and Plan B have been determined by review of patient's clinical status, future medical and therapeutic needs, and coverage/benefits for post-acute care in coordination with multidisciplinary team members.                 Aleksander Gumzán, LIZ, LMSW  [facility]  Case Management  Ext. [phone]

## 2024-11-23 NOTE — PLAN OF CARE
Problem: Adult Inpatient Plan of Care  Goal: Plan of Care Review  Outcome: Progressing  Flowsheets (Taken 11/23/2024 2061)  Plan of Care Reviewed With: patient  Goal: Patient-Specific Goal (Individualized)  Outcome: Progressing  Goal: Absence of Hospital-Acquired Illness or Injury  Outcome: Progressing  Goal: Optimal Comfort and Wellbeing  Outcome: Progressing  Goal: Readiness for Transition of Care  Outcome: Progressing     Problem: Sepsis/Septic Shock  Goal: Optimal Coping  Outcome: Progressing  Goal: Absence of Bleeding  Outcome: Progressing  Goal: Blood Glucose Level Within Targeted Range  Outcome: Progressing  Goal: Absence of Infection Signs and Symptoms  Outcome: Progressing  Goal: Optimal Nutrition Intake  Outcome: Progressing     Problem: Infection  Goal: Absence of Infection Signs and Symptoms  Outcome: Progressing     Problem: Skin Injury Risk Increased  Goal: Skin Health and Integrity  Outcome: Progressing  Intervention: Promote and Optimize Oral Intake  Flowsheets (Taken 11/23/2024 1472)  Nutrition Interventions: food preferences provided

## 2024-11-23 NOTE — ASSESSMENT & PLAN NOTE
- reports fall which possibly happened in his sleep (?) w/ head trauma, unknown LOC  - will check CTH given headaches, can also eval sinuses - negative  - neuro checks q4h now discontinued  - alert and fully oriented

## 2024-11-23 NOTE — ASSESSMENT & PLAN NOTE
LOGAN is likely due to pre-renal azotemia due to dehydration and ?contrast . Baseline creatinine is  unknown . Most recent creatinine and eGFR are listed below.  Recent Labs     11/22/24  0913 11/23/24  0459 11/23/24  1009   CREATININE 1.7* 1.6* 1.6*   EGFRNORACEVR 49.1* 52.8* 52.8*        Plan  - LOGAN is improved and stable  - Avoid nephrotoxins and renally dose meds for GFR listed above  - Monitor urine output, serial BMP, and adjust therapy as needed; will review urine studies and FENA if worsens; Vanc/Zosyn combination adding to mild renal insult but benefits outweigh risks

## 2024-11-23 NOTE — PROGRESS NOTES
Pharmacokinetic Assessment Follow Up: IV Vancomycin    Vancomycin serum concentration assessment(s):    The random level was drawn correctly and can be used to guide therapy at this time. The measurement is within the desired definitive target range of 10 to 20 mcg/mL.    Vancomycin Regimen Plan:    C/w pulse dosing d/t LOGAN  1.5 g IV vancomycin dose once  VR w/ 11/24 AM labs    Drug levels (last 3 results):  Recent Labs   Lab Result Units 11/21/24  0323 11/22/24  0349 11/23/24  0459   Vancomycin, Random ug/mL 9.8 9.7 10.6     Pharmacy will continue to follow and monitor vancomycin.    Please contact pharmacy at extension [phone] for questions regarding this assessment.    Thank you for the consult,   Yuliana Andersen     Patient brief summary:  Gentry Dowling is a 48 y.o. male initiated on antimicrobial therapy with IV Vancomycin for treatment of sepsis    Drug Allergies:   Review of patient's allergies indicates:  No Known Allergies    Actual Body Weight:   111.1 kg    Renal Function:   Estimated Creatinine Clearance: 41.8 mL/min (A) (based on SCr of 1.6 mg/dL (H)).,     Dialysis Method (if applicable):  N/A    CBC (last 72 hours):  Recent Labs   Lab Result Units 11/22/24  0913   WBC K/uL 7.92   Hemoglobin g/dL 13.0*   Hematocrit % 39.6*   Platelets K/uL 362   Gran % % 83.2*   Lymph % % 10.5*   Mono % % 3.2*   Eosinophil % % 2.1   Basophil % % 0.5   Differential Method  Automated       Metabolic Panel (last 72 hours):  Recent Labs   Lab Result Units 11/21/24  1024 11/21/24  1135 11/22/24  0348 11/22/24  0913 11/23/24  0459   Sodium mmol/L 143  --   --  140  --    Potassium mmol/L 3.4*  --   --  3.4*  --    Chloride mmol/L 106  --   --  106  --    CO2 mmol/L 29  --   --  24  --    Glucose mg/dL 90  --   --  147*  --    BUN mg/dL 12  --   --  14  --    Creatinine mg/dL 1.9*  --  1.7* 1.7* 1.6*   Albumin g/dL  --  2.4*  --  2.4*  --    Total Bilirubin mg/dL  --  0.3  --  0.4  --    Alkaline Phosphatase U/L  --  93  --  94  --     AST U/L  --  26  --  27  --    ALT U/L  --  47*  --  65*  --        Vancomycin Administrations:  vancomycin given in the last 96 hours                     vancomycin 1,250 mg in 0.9% NaCl 250 mL IVPB (admixture device) (mg) 1,250 mg New Bag 11/21/24 0911    vancomycin (VANCOCIN) 1,000 mg in D5W 250 mL IVPB (admixture device) (mg) 1,000 mg New Bag 11/20/24 0908    vancomycin 750 mg in D5W 250 mL IVPB (admixture device) (mg) 750 mg New Bag 11/18/24 1100                    Microbiologic Results:  Microbiology Results (last 7 days)       Procedure Component Value Units Date/Time    Blood culture #1 **CANNOT BE ORDERED STAT** [[id number]] Collected: 11/17/24 1749    Order Status: Completed Specimen: Blood from Peripheral, Upper Arm, Right Updated: 11/22/24 2012     Blood Culture, Routine No growth after 5 days.    Blood culture #2 **CANNOT BE ORDERED STAT** [[id number]] Collected: 11/17/24 1749    Order Status: Completed Specimen: Blood from Peripheral, Upper Arm, Right Updated: 11/22/24 2012     Blood Culture, Routine No growth after 5 days.    Stool culture [[id number]] Collected: 11/22/24 1128    Order Status: Sent Specimen: Stool Updated: 11/22/24 1253    E. coli 0157 antigen [[id number]] Collected: 11/22/24 1128    Order Status: No result Specimen: Stool Updated: 11/22/24 1253    Influenza A & B by Molecular [[id number]] Collected: 11/17/24 1721    Order Status: Completed Specimen: Nasopharyngeal Swab Updated: 11/17/24 1756     Influenza A, Molecular Negative     Influenza B, Molecular Negative     Flu A & B Source Nasal swab

## 2024-11-24 LAB
ALBUMIN SERPL BCP-MCNC: 2.6 G/DL (ref 3.5–5.2)
ALP SERPL-CCNC: 90 U/L (ref 40–150)
ALT SERPL W/O P-5'-P-CCNC: 65 U/L (ref 10–44)
ANION GAP SERPL CALC-SCNC: 11 MMOL/L (ref 8–16)
AST SERPL-CCNC: 26 U/L (ref 10–40)
BASOPHILS # BLD AUTO: 0.07 K/UL (ref 0–0.2)
BASOPHILS NFR BLD: 0.9 % (ref 0–1.9)
BILIRUB SERPL-MCNC: 0.5 MG/DL (ref 0.1–1)
BUN SERPL-MCNC: 17 MG/DL (ref 6–20)
CALCIUM SERPL-MCNC: 8.9 MG/DL (ref 8.7–10.5)
CHLORIDE SERPL-SCNC: 108 MMOL/L (ref 95–110)
CO2 SERPL-SCNC: 25 MMOL/L (ref 23–29)
CREAT SERPL-MCNC: 1.8 MG/DL (ref 0.5–1.4)
CREAT SERPL-MCNC: 1.8 MG/DL (ref 0.5–1.4)
CREAT UR-MCNC: 82 MG/DL (ref 23–375)
DIFFERENTIAL METHOD BLD: ABNORMAL
E COLI SXT1 STL QL IA: NEGATIVE
E COLI SXT2 STL QL IA: NEGATIVE
EOSINOPHIL # BLD AUTO: 0.2 K/UL (ref 0–0.5)
EOSINOPHIL NFR BLD: 2.1 % (ref 0–8)
ERYTHROCYTE [DISTWIDTH] IN BLOOD BY AUTOMATED COUNT: 13.4 % (ref 11.5–14.5)
EST. GFR  (NO RACE VARIABLE): 45.6 ML/MIN/1.73 M^2
EST. GFR  (NO RACE VARIABLE): 45.6 ML/MIN/1.73 M^2
GLUCOSE SERPL-MCNC: 140 MG/DL (ref 70–110)
HCT VFR BLD AUTO: 38.8 % (ref 40–54)
HGB BLD-MCNC: 13.1 G/DL (ref 14–18)
IMM GRANULOCYTES # BLD AUTO: 0.04 K/UL (ref 0–0.04)
IMM GRANULOCYTES NFR BLD AUTO: 0.5 % (ref 0–0.5)
LYMPHOCYTES # BLD AUTO: 0.8 K/UL (ref 1–4.8)
LYMPHOCYTES NFR BLD: 9.9 % (ref 18–48)
MCH RBC QN AUTO: 29.7 PG (ref 27–31)
MCHC RBC AUTO-ENTMCNC: 33.8 G/DL (ref 32–36)
MCV RBC AUTO: 88 FL (ref 82–98)
MONOCYTES # BLD AUTO: 0.3 K/UL (ref 0.3–1)
MONOCYTES NFR BLD: 3.9 % (ref 4–15)
NEUTROPHILS # BLD AUTO: 6.7 K/UL (ref 1.8–7.7)
NEUTROPHILS NFR BLD: 82.7 % (ref 38–73)
NRBC BLD-RTO: 0 /100 WBC
PLATELET # BLD AUTO: 358 K/UL (ref 150–450)
PMV BLD AUTO: 9.2 FL (ref 9.2–12.9)
POTASSIUM SERPL-SCNC: 3.5 MMOL/L (ref 3.5–5.1)
PROT SERPL-MCNC: 6.6 G/DL (ref 6–8.4)
RBC # BLD AUTO: 4.41 M/UL (ref 4.6–6.2)
SODIUM SERPL-SCNC: 144 MMOL/L (ref 136–145)
SODIUM UR-SCNC: 97 MMOL/L (ref 20–250)
VANCOMYCIN SERPL-MCNC: 20 UG/ML
VANCOMYCIN SERPL-MCNC: 9.7 UG/ML
WBC # BLD AUTO: 8.05 K/UL (ref 3.9–12.7)

## 2024-11-24 PROCEDURE — 36415 COLL VENOUS BLD VENIPUNCTURE: CPT | Performed by: STUDENT IN AN ORGANIZED HEALTH CARE EDUCATION/TRAINING PROGRAM

## 2024-11-24 PROCEDURE — 25000003 PHARM REV CODE 250: Performed by: STUDENT IN AN ORGANIZED HEALTH CARE EDUCATION/TRAINING PROGRAM

## 2024-11-24 PROCEDURE — 80202 ASSAY OF VANCOMYCIN: CPT | Performed by: STUDENT IN AN ORGANIZED HEALTH CARE EDUCATION/TRAINING PROGRAM

## 2024-11-24 PROCEDURE — 87507 IADNA-DNA/RNA PROBE TQ 12-25: CPT | Performed by: STUDENT IN AN ORGANIZED HEALTH CARE EDUCATION/TRAINING PROGRAM

## 2024-11-24 PROCEDURE — 63600175 PHARM REV CODE 636 W HCPCS: Performed by: STUDENT IN AN ORGANIZED HEALTH CARE EDUCATION/TRAINING PROGRAM

## 2024-11-24 PROCEDURE — 82565 ASSAY OF CREATININE: CPT | Performed by: STUDENT IN AN ORGANIZED HEALTH CARE EDUCATION/TRAINING PROGRAM

## 2024-11-24 PROCEDURE — 63600175 PHARM REV CODE 636 W HCPCS: Performed by: PHYSICIAN ASSISTANT

## 2024-11-24 PROCEDURE — 84300 ASSAY OF URINE SODIUM: CPT | Performed by: STUDENT IN AN ORGANIZED HEALTH CARE EDUCATION/TRAINING PROGRAM

## 2024-11-24 PROCEDURE — 82570 ASSAY OF URINE CREATININE: CPT | Performed by: STUDENT IN AN ORGANIZED HEALTH CARE EDUCATION/TRAINING PROGRAM

## 2024-11-24 PROCEDURE — 25000003 PHARM REV CODE 250: Performed by: PHYSICIAN ASSISTANT

## 2024-11-24 PROCEDURE — 85025 COMPLETE CBC W/AUTO DIFF WBC: CPT | Performed by: STUDENT IN AN ORGANIZED HEALTH CARE EDUCATION/TRAINING PROGRAM

## 2024-11-24 PROCEDURE — 80053 COMPREHEN METABOLIC PANEL: CPT | Performed by: STUDENT IN AN ORGANIZED HEALTH CARE EDUCATION/TRAINING PROGRAM

## 2024-11-24 PROCEDURE — 21400001 HC TELEMETRY ROOM

## 2024-11-24 RX ADMIN — Medication 300 ML: at 07:11

## 2024-11-24 RX ADMIN — Medication 1 CAPSULE: at 10:11

## 2024-11-24 RX ADMIN — Medication 300 ML: at 02:11

## 2024-11-24 RX ADMIN — PIPERACILLIN SODIUM AND TAZOBACTAM SODIUM 4.5 G: 4; .5 INJECTION, POWDER, FOR SOLUTION INTRAVENOUS at 02:11

## 2024-11-24 RX ADMIN — Medication 300 ML: at 11:11

## 2024-11-24 RX ADMIN — FLUTICASONE PROPIONATE 100 MCG: 50 SPRAY, METERED NASAL at 10:11

## 2024-11-24 RX ADMIN — GUAIFENESIN AND DEXTROMETHORPHAN HYDROBROMIDE 2 TABLET: 600; 30 TABLET, EXTENDED RELEASE ORAL at 09:11

## 2024-11-24 RX ADMIN — PIPERACILLIN SODIUM AND TAZOBACTAM SODIUM 4.5 G: 4; .5 INJECTION, POWDER, FOR SOLUTION INTRAVENOUS at 11:11

## 2024-11-24 RX ADMIN — VANCOMYCIN HYDROCHLORIDE 1500 MG: 1.5 INJECTION, POWDER, LYOPHILIZED, FOR SOLUTION INTRAVENOUS at 09:11

## 2024-11-24 RX ADMIN — GUAIFENESIN AND DEXTROMETHORPHAN HYDROBROMIDE 2 TABLET: 600; 30 TABLET, EXTENDED RELEASE ORAL at 10:11

## 2024-11-24 RX ADMIN — PIPERACILLIN SODIUM AND TAZOBACTAM SODIUM 4.5 G: 4; .5 INJECTION, POWDER, FOR SOLUTION INTRAVENOUS at 05:11

## 2024-11-24 NOTE — PROGRESS NOTES
Pharmacokinetic Assessment Follow Up: IV Vancomycin    Vancomycin serum concentration assessment(s):    The random level was drawn incorrectly and cannot be used to guide therapy at this time. Vancomycin dose delayed until 2051 on 11/23 due to loss of IV access. VR at 20 mcg/mL was 8 hours post dose    Vancomycin Regimen Plan:    C/w pulse dosing d/t LOGAN  VR 11/24 @ 1930, ~23 hours from last dose    Drug levels (last 3 results):  Recent Labs   Lab Result Units 11/22/24  0349 11/23/24 0459 11/24/24  0523   Vancomycin, Random ug/mL 9.7 10.6 20.0     Pharmacy will continue to follow and monitor vancomycin.    Please contact pharmacy at extension [phone] for questions regarding this assessment.    Thank you for the consult,   Yuliana Andersen     Patient brief summary:  Gentry Dowling is a 49 y.o. male initiated on antimicrobial therapy with IV Vancomycin for treatment of sepsis    Drug Allergies:   Review of patient's allergies indicates:  No Known Allergies    Actual Body Weight:   111.1 kg    Renal Function:   Estimated Creatinine Clearance: 61.6 mL/min (A) (based on SCr of 1.8 mg/dL (H)).,     Dialysis Method (if applicable):  N/A    CBC (last 72 hours):  Recent Labs   Lab Result Units 11/22/24  0913 11/23/24  1009 11/24/24  0852   WBC K/uL 7.92 8.69 8.05   Hemoglobin g/dL 13.0* 13.1* 13.1*   Hematocrit % 39.6* 41.2 38.8*   Platelets K/uL 362 426 358   Gran % % 83.2* 81.4* 82.7*   Lymph % % 10.5* 11.0* 9.9*   Mono % % 3.2* 4.0 3.9*   Eosinophil % % 2.1 2.2 2.1   Basophil % % 0.5 0.9 0.9   Differential Method  Automated Automated Automated       Metabolic Panel (last 72 hours):  Recent Labs   Lab Result Units 11/22/24  0348 11/22/24  0913 11/23/24 0459 11/23/24  1009 11/24/24  0523 11/24/24  0852   Sodium mmol/L  --  140  --  144  --  144   Potassium mmol/L  --  3.4*  --  3.6  --  3.5   Chloride mmol/L  --  106  --  105  --  108   CO2 mmol/L  --  24  --  31*  --  25   Glucose mg/dL  --  147*  --  101  --  140*   BUN mg/dL   --  14  --  15  --  17   Creatinine mg/dL 1.7* 1.7* 1.6* 1.6* 1.8* 1.8*   Albumin g/dL  --  2.4*  --  2.6*  --  2.6*   Total Bilirubin mg/dL  --  0.4  --  0.4  --  0.5   Alkaline Phosphatase U/L  --  94  --  97  --  90   AST U/L  --  27  --  31  --  26   ALT U/L  --  65*  --  68*  --  65*       Vancomycin Administrations:  vancomycin given in the last 96 hours                     vancomycin 1,250 mg in 0.9% NaCl 250 mL IVPB (admixture device) (mg) 1,250 mg New Bag 11/21/24 0911    vancomycin (VANCOCIN) 1,000 mg in D5W 250 mL IVPB (admixture device) (mg) 1,000 mg New Bag 11/20/24 0908    vancomycin 750 mg in D5W 250 mL IVPB (admixture device) (mg) 750 mg New Bag 11/18/24 1100                    Microbiologic Results:  Microbiology Results (last 7 days)       Procedure Component Value Units Date/Time    Stool culture [[id number]] Collected: 11/22/24 1128    Order Status: Completed Specimen: Stool Updated: 11/24/24 1252     Stool Culture Culture in progress    Blood culture #1 **CANNOT BE ORDERED STAT** [[id number]] Collected: 11/17/24 1749    Order Status: Completed Specimen: Blood from Peripheral, Upper Arm, Right Updated: 11/22/24 2012     Blood Culture, Routine No growth after 5 days.    Blood culture #2 **CANNOT BE ORDERED STAT** [[id number]] Collected: 11/17/24 1749    Order Status: Completed Specimen: Blood from Peripheral, Upper Arm, Right Updated: 11/22/24 2012     Blood Culture, Routine No growth after 5 days.    E. coli 0157 antigen [[id number]] Collected: 11/22/24 1128    Order Status: No result Specimen: Stool Updated: 11/22/24 1253    Influenza A & B by Molecular [[id number]] Collected: 11/17/24 1721    Order Status: Completed Specimen: Nasopharyngeal Swab Updated: 11/17/24 1756     Influenza A, Molecular Negative     Influenza B, Molecular Negative     Flu A & B Source Nasal swab

## 2024-11-24 NOTE — PROGRESS NOTES
"[facility] Internal Medicine [facility] Medicine  Progress Note    Patient Name: Gentry Dowling  MRN: [id number]  Patient Class: IP- Inpatient   Admission Date: 11/17/2024  Length of Stay: 7 days  Attending Physician: Barrett Reno MD  Primary Care Provider: Kishore Erickson MD        Subjective:     Principal Problem:Sepsis without acute organ dysfunction        HPI:  Gentry Dowling is a 48 y.o. male with a PMHx of obesity who [place] [facility] [place]. Patient reports fever with associated fatigue, poor appetite, mailase and feeling generally unwell for the past week. He reports mild cough, intermittent right lower quadrant "gas pain" for the past few days. He notices fever usually occurs in the late afternoon which causes difficulty sleeping. He thinks he fell in his sleep and hit his head a few days ago as he woke up with a cut to his head and soreness to the R side of his body. Does not remember the fall and unsure if LOC. Reports intermittent headache which he feels is due to dehydration. Of note, patient reports sinus congestion and pain began about 1 month ago. His mother had similar symptoms about 1 month ago which have since resolved. Denies chest pain, LE swelling, rash, vision changes, N/V, diarrhea. He does mention that he has a family history of leukemia.     Overview/Hospital Course:  ED: Temp 102, , BP 93/63, vitals improved s/p 1L IVFs, tylenol and IV vanc and zosyn. Leukocytosis of WBC 13.6. UA non infectious. POC lactic WNL. CT c/a/p with Posterior right hepatic lobe heterogeneously enhancing/hypoenhancing area.  The abrupt diminished enhancement of posterior right portal vein raises suspicion for thrombosis and associated hepatic infarction.  Other underlying etiology including infection/inflammation not excluded.   Ultrasound negative for portal vein thrombosis. MRI ordered. Pt developed LOGAN which improved with IVF and oral intake.  MRI revealing for pylephlebitis " of portal vessel. Given high mortality associated with this condition, close monitoring for bowel ischemia and other complications necessary and discussed with patient.  However, appropriate treatment ongoing, including treatment with Zosyn.  Given this finding being suggestive of intra-abdominal infection and associated symptoms being present, stool samples obtained and GI PCR panel.    Interval History: Patient with mild dysphoria due to it being his birthday and prolonged hospitalization.  Continues with nonbloody diarrhea.  Remains afebrile without elevated white blood cell count.  Denies nausea, night sweats.    Review of Systems   Constitutional:  Negative for appetite change, diaphoresis and fever.   HENT:  Negative for sore throat.    Respiratory:  Negative for cough and shortness of breath.    Cardiovascular:  Negative for chest pain and palpitations.   Gastrointestinal:  Positive for diarrhea. Negative for abdominal pain, blood in stool, nausea and vomiting.   Genitourinary:  Negative for difficulty urinating.   Musculoskeletal:         Chronic R shoulder pain from known rotator's cuff tear   Skin:  Negative for color change.   Neurological:  Negative for light-headedness and headaches.     Objective:     Vital Signs (Most Recent):  Temp: 98.2 °F (36.8 °C) (11/24/24 0730)  Pulse: 84 (11/24/24 0757)  Resp: 18 (11/24/24 0730)  BP: 134/87 (11/24/24 0730)  SpO2: 96 % (11/24/24 0730) Vital Signs (24h Range):  Temp:  [97.7 °F (36.5 °C)-98.8 °F (37.1 °C)] 98.2 °F (36.8 °C)  Pulse:  [71-97] 84  Resp:  [18] 18  SpO2:  [94 %-96 %] 96 %  BP: (105-134)/(70-87) 134/87     Weight: 109.9 kg (242 lb 4.6 oz)  Body mass index is 34.76 kg/m².    Intake/Output Summary (Last 24 hours) at 11/24/2024 1102  Last data filed at 11/24/2024 1032  Gross per 24 hour   Intake 1691.29 ml   Output --   Net 1691.29 ml         Physical Exam  Constitutional:       Appearance: He is not toxic-appearing.   Eyes:      General: No scleral  icterus.     Conjunctiva/sclera: Conjunctivae normal.   Cardiovascular:      Rate and Rhythm: Normal rate and regular rhythm.   Pulmonary:      Effort: Pulmonary effort is normal. No respiratory distress.   Abdominal:      General: There is no distension.      Tenderness: There is no abdominal tenderness. There is no guarding.   Musculoskeletal:      Right lower leg: No edema.      Left lower leg: No edema.   Skin:     Coloration: Skin is not jaundiced.   Neurological:      General: No focal deficit present.      Mental Status: He is alert and oriented to person, place, and time.   Psychiatric:         Mood and Affect: Mood normal.         Behavior: Behavior normal.             Significant Labs: All pertinent labs within the past 24 hours have been reviewed.  CBC:   Recent Labs   Lab 11/23/24  1009 11/24/24  0852   WBC 8.69 8.05   HGB 13.1* 13.1*   HCT 41.2 38.8*    358     CMP:   Recent Labs   Lab 11/23/24  1009 11/24/24  0523 11/24/24  0852     --  144   K 3.6  --  3.5     --  108   CO2 31*  --  25     --  140*   BUN 15  --  17   CREATININE 1.6* 1.8* 1.8*   CALCIUM 9.2  --  8.9   PROT 7.0  --  6.6   ALBUMIN 2.6*  --  2.6*   BILITOT 0.4  --  0.5   ALKPHOS 97  --  90   AST 31  --  26   ALT 68*  --  65*   ANIONGAP 8  --  11       Significant Imaging: I have reviewed all pertinent imaging results/findings within the past 24 hours.    Assessment/Plan:      * Sepsis without acute organ dysfunction  Fever of unknown origin (abdominal/GI related)  - febrile with tachycardia and leukocytosis on admit  - lactic WNL  - source unclear- UA, CXR unremarkable  - COVID/flu negative; HIV neg  - CTH negative  - CT chest/abd/pelvis ordered - Posterior right hepatic lobe heterogeneously enhancing/hypoenhancing area. The abrupt diminished enhancement of posterior right portal vein raises suspicion for thrombosis and associated hepatic infarction.  Other underlying etiology including infection/inflammation not  excluded.    Symmetric bilateral perinephric fat stranding with unknown chronicity.  Urinalysis can be attempted to rule out infectious process.   Mild hepatosplenomegaly.   - Obtained ultrasound - negative for thrombosis. Heterogeneous hypoechoic lesion near the dome of the right hepatic lobe posteriorly.  A mass is not excluded.  MRI ordered and evident of some edema around liver without abscess and pylephlebitis, which hints at intra-abdominal infection: continue Vancomycin, Zosyn; no anticoagulation required; obtaining stool studies to assess for culprit as well as GI pathogen PCR panel; considering Infectious Diseases consult once samples return but feel confident in Zosyn; monitoring liver function closely and signs/symptoms of infarct in any bowel region; potential consult to Interventional Radiology for potential drainage of region (may be limited by lack of abscess) for sample evaluation but may wait for planned repeat MRI on 11/25  - GI pathogen panel pending; notable negatives: Hep A, B, C, Rotavirus, Giardia, Cryptosporidium  - continue BS IV vanc and zosyn for now  - no photophobia or neck rigidity, had joint injections but roughly 8 years ago, no rash, pulmonary symptoms, murmur, or sinus discomfort; difficult to ascertain cause at this stage but improving overall  - will continue to monitor progress and obtain additional labs as appropriate  - encourage oral hydration    LOGAN (acute kidney injury)  LOGAN is likely due to pre-renal azotemia due to dehydration and ?contrast . Baseline creatinine is  unknown . Most recent creatinine and eGFR are listed below.  Recent Labs     11/23/24  1009 11/24/24  0523 11/24/24  0852   CREATININE 1.6* 1.8* 1.8*   EGFRNORACEVR 52.8* 45.6* 45.6*        Plan  - LOGAN is improved and stable  - Avoid nephrotoxins and renally dose meds for GFR listed above  - Monitor urine output, serial BMP, and adjust therapy as needed; obtaining urine sample for FENA; Vanc/Zosyn combination  adding to mild renal insult but benefits outweigh risks    Hypokalemia  Patient's most recent potassium results are listed below.   Recent Labs     11/22/24  0913 11/23/24  1009 11/24/24  0852   K 3.4* 3.6 3.5       Plan  - Replete potassium per protocol  - Monitor potassium Daily  - Patient's hypokalemia is stable    Head trauma  - reports fall which possibly happened in his sleep (?) w/ head trauma, unknown LOC  - will check CTH given headaches, can also eval sinuses - negative  - neuro checks q4h now discontinued  - alert and fully oriented      VTE Risk Mitigation (From admission, onward)           Ordered     enoxaparin injection 30 mg  Daily         11/23/24 0729     IP VTE HIGH RISK PATIENT  Once         11/17/24 2232                    Discharge Planning   PRESTON: 11/26/2024     Code Status: Full Code   Is the patient medically ready for discharge?: No  Repeat MRI on 11/25; strong Abx while awaiting pathogen studies    Reason for patient still in hospital (select all that apply): Patient trending condition  Discharge Plan A: Home with family            Barrett Reno MD  Department of Hospital Medicine   [facility] - Internal Medicine Telemetry

## 2024-11-24 NOTE — ASSESSMENT & PLAN NOTE
LOGAN is likely due to pre-renal azotemia due to dehydration and ?contrast . Baseline creatinine is  unknown . Most recent creatinine and eGFR are listed below.  Recent Labs     11/23/24  1009 11/24/24  0523 11/24/24  0852   CREATININE 1.6* 1.8* 1.8*   EGFRNORACEVR 52.8* 45.6* 45.6*        Plan  - LOGAN is improved and stable  - Avoid nephrotoxins and renally dose meds for GFR listed above  - Monitor urine output, serial BMP, and adjust therapy as needed; obtaining urine sample for FENA; Vanc/Zosyn combination adding to mild renal insult but benefits outweigh risks

## 2024-11-24 NOTE — ASSESSMENT & PLAN NOTE
Patient's most recent potassium results are listed below.   Recent Labs     11/22/24  0913 11/23/24  1009 11/24/24  0852   K 3.4* 3.6 3.5       Plan  - Replete potassium per protocol  - Monitor potassium Daily  - Patient's hypokalemia is stable

## 2024-11-24 NOTE — SUBJECTIVE & OBJECTIVE
Interval History: Patient with mild dysphoria due to it being his birthday and prolonged hospitalization.  Continues with nonbloody diarrhea.  Remains afebrile without elevated white blood cell count.  Denies nausea, night sweats.    Review of Systems   Constitutional:  Negative for appetite change, diaphoresis and fever.   HENT:  Negative for sore throat.    Respiratory:  Negative for cough and shortness of breath.    Cardiovascular:  Negative for chest pain and palpitations.   Gastrointestinal:  Positive for diarrhea. Negative for abdominal pain, blood in stool, nausea and vomiting.   Genitourinary:  Negative for difficulty urinating.   Musculoskeletal:         Chronic R shoulder pain from known rotator's cuff tear   Skin:  Negative for color change.   Neurological:  Negative for light-headedness and headaches.     Objective:     Vital Signs (Most Recent):  Temp: 98.2 °F (36.8 °C) (11/24/24 0730)  Pulse: 84 (11/24/24 0757)  Resp: 18 (11/24/24 0730)  BP: 134/87 (11/24/24 0730)  SpO2: 96 % (11/24/24 0730) Vital Signs (24h Range):  Temp:  [97.7 °F (36.5 °C)-98.8 °F (37.1 °C)] 98.2 °F (36.8 °C)  Pulse:  [71-97] 84  Resp:  [18] 18  SpO2:  [94 %-96 %] 96 %  BP: (105-134)/(70-87) 134/87     Weight: 109.9 kg (242 lb 4.6 oz)  Body mass index is 34.76 kg/m².    Intake/Output Summary (Last 24 hours) at 11/24/2024 1102  Last data filed at 11/24/2024 1032  Gross per 24 hour   Intake 1691.29 ml   Output --   Net 1691.29 ml         Physical Exam  Constitutional:       Appearance: He is not toxic-appearing.   Eyes:      General: No scleral icterus.     Conjunctiva/sclera: Conjunctivae normal.   Cardiovascular:      Rate and Rhythm: Normal rate and regular rhythm.   Pulmonary:      Effort: Pulmonary effort is normal. No respiratory distress.   Abdominal:      General: There is no distension.      Tenderness: There is no abdominal tenderness. There is no guarding.   Musculoskeletal:      Right lower leg: No edema.      Left lower  leg: No edema.   Skin:     Coloration: Skin is not jaundiced.   Neurological:      General: No focal deficit present.      Mental Status: He is alert and oriented to person, place, and time.   Psychiatric:         Mood and Affect: Mood normal.         Behavior: Behavior normal.             Significant Labs: All pertinent labs within the past 24 hours have been reviewed.  CBC:   Recent Labs   Lab 11/23/24  1009 11/24/24  0852   WBC 8.69 8.05   HGB 13.1* 13.1*   HCT 41.2 38.8*    358     CMP:   Recent Labs   Lab 11/23/24  1009 11/24/24  0523 11/24/24  0852     --  144   K 3.6  --  3.5     --  108   CO2 31*  --  25     --  140*   BUN 15  --  17   CREATININE 1.6* 1.8* 1.8*   CALCIUM 9.2  --  8.9   PROT 7.0  --  6.6   ALBUMIN 2.6*  --  2.6*   BILITOT 0.4  --  0.5   ALKPHOS 97  --  90   AST 31  --  26   ALT 68*  --  65*   ANIONGAP 8  --  11       Significant Imaging: I have reviewed all pertinent imaging results/findings within the past 24 hours.

## 2024-11-24 NOTE — ASSESSMENT & PLAN NOTE
Fever of unknown origin (abdominal/GI related)  - febrile with tachycardia and leukocytosis on admit  - lactic WNL  - source unclear- UA, CXR unremarkable  - COVID/flu negative; HIV neg  - CTH negative  - CT chest/abd/pelvis ordered - Posterior right hepatic lobe heterogeneously enhancing/hypoenhancing area. The abrupt diminished enhancement of posterior right portal vein raises suspicion for thrombosis and associated hepatic infarction.  Other underlying etiology including infection/inflammation not excluded.    Symmetric bilateral perinephric fat stranding with unknown chronicity.  Urinalysis can be attempted to rule out infectious process.   Mild hepatosplenomegaly.   - Obtained ultrasound - negative for thrombosis. Heterogeneous hypoechoic lesion near the dome of the right hepatic lobe posteriorly.  A mass is not excluded.  MRI ordered and evident of some edema around liver without abscess and pylephlebitis, which hints at intra-abdominal infection: continue Vancomycin, Zosyn; no anticoagulation required; obtaining stool studies to assess for culprit as well as GI pathogen PCR panel; considering Infectious Diseases consult once samples return but feel confident in Zosyn; monitoring liver function closely and signs/symptoms of infarct in any bowel region; potential consult to Interventional Radiology for potential drainage of region (may be limited by lack of abscess) for sample evaluation but may wait for planned repeat MRI on 11/25  - GI pathogen panel pending; notable negatives: Hep A, B, C, Rotavirus, Giardia, Cryptosporidium  - continue BS IV vanc and zosyn for now  - no photophobia or neck rigidity, had joint injections but roughly 8 years ago, no rash, pulmonary symptoms, murmur, or sinus discomfort; difficult to ascertain cause at this stage but improving overall  - will continue to monitor progress and obtain additional labs as appropriate  - encourage oral hydration

## 2024-11-25 LAB
BACTERIA STL CULT: NORMAL
CALPROTECTIN STL-MCNT: 118 MCG/G
CREAT SERPL-MCNC: 1.7 MG/DL (ref 0.5–1.4)
EST. GFR  (NO RACE VARIABLE): 48.8 ML/MIN/1.73 M^2
FAT STL QL: NORMAL
NEUTRAL FAT STL QL: NORMAL
VANCOMYCIN SERPL-MCNC: 15.3 UG/ML

## 2024-11-25 PROCEDURE — 25000003 PHARM REV CODE 250: Performed by: STUDENT IN AN ORGANIZED HEALTH CARE EDUCATION/TRAINING PROGRAM

## 2024-11-25 PROCEDURE — 63600175 PHARM REV CODE 636 W HCPCS: Performed by: PHYSICIAN ASSISTANT

## 2024-11-25 PROCEDURE — 25000003 PHARM REV CODE 250: Performed by: PHYSICIAN ASSISTANT

## 2024-11-25 PROCEDURE — 21400001 HC TELEMETRY ROOM

## 2024-11-25 PROCEDURE — 63600175 PHARM REV CODE 636 W HCPCS: Performed by: STUDENT IN AN ORGANIZED HEALTH CARE EDUCATION/TRAINING PROGRAM

## 2024-11-25 PROCEDURE — 36415 COLL VENOUS BLD VENIPUNCTURE: CPT | Performed by: STUDENT IN AN ORGANIZED HEALTH CARE EDUCATION/TRAINING PROGRAM

## 2024-11-25 PROCEDURE — 25000242 PHARM REV CODE 250 ALT 637 W/ HCPCS: Performed by: STUDENT IN AN ORGANIZED HEALTH CARE EDUCATION/TRAINING PROGRAM

## 2024-11-25 PROCEDURE — 80202 ASSAY OF VANCOMYCIN: CPT | Performed by: STUDENT IN AN ORGANIZED HEALTH CARE EDUCATION/TRAINING PROGRAM

## 2024-11-25 PROCEDURE — 25500020 PHARM REV CODE 255: Performed by: STUDENT IN AN ORGANIZED HEALTH CARE EDUCATION/TRAINING PROGRAM

## 2024-11-25 PROCEDURE — 27000207 HC ISOLATION

## 2024-11-25 PROCEDURE — A9585 GADOBUTROL INJECTION: HCPCS | Performed by: STUDENT IN AN ORGANIZED HEALTH CARE EDUCATION/TRAINING PROGRAM

## 2024-11-25 PROCEDURE — 82565 ASSAY OF CREATININE: CPT | Performed by: STUDENT IN AN ORGANIZED HEALTH CARE EDUCATION/TRAINING PROGRAM

## 2024-11-25 RX ORDER — GADOBUTROL 604.72 MG/ML
10 INJECTION INTRAVENOUS
Status: COMPLETED | OUTPATIENT
Start: 2024-11-25 | End: 2024-11-25

## 2024-11-25 RX ADMIN — Medication 300 ML: at 11:11

## 2024-11-25 RX ADMIN — FLUTICASONE PROPIONATE 100 MCG: 50 SPRAY, METERED NASAL at 09:11

## 2024-11-25 RX ADMIN — GUAIFENESIN AND DEXTROMETHORPHAN HYDROBROMIDE 2 TABLET: 600; 30 TABLET, EXTENDED RELEASE ORAL at 08:11

## 2024-11-25 RX ADMIN — ACETAMINOPHEN 650 MG: 325 TABLET ORAL at 11:11

## 2024-11-25 RX ADMIN — PIPERACILLIN SODIUM AND TAZOBACTAM SODIUM 4.5 G: 4; .5 INJECTION, POWDER, FOR SOLUTION INTRAVENOUS at 05:11

## 2024-11-25 RX ADMIN — Medication 1 CAPSULE: at 09:11

## 2024-11-25 RX ADMIN — GUAIFENESIN AND DEXTROMETHORPHAN HYDROBROMIDE 2 TABLET: 600; 30 TABLET, EXTENDED RELEASE ORAL at 09:11

## 2024-11-25 RX ADMIN — Medication 6 MG: at 11:11

## 2024-11-25 RX ADMIN — Medication 300 ML: at 03:11

## 2024-11-25 RX ADMIN — Medication 300 ML: at 08:11

## 2024-11-25 RX ADMIN — GADOBUTROL 10 ML: 604.72 INJECTION INTRAVENOUS at 10:11

## 2024-11-25 RX ADMIN — Medication 300 ML: at 09:11

## 2024-11-25 RX ADMIN — PIPERACILLIN SODIUM AND TAZOBACTAM SODIUM 4.5 G: 4; .5 INJECTION, POWDER, FOR SOLUTION INTRAVENOUS at 02:11

## 2024-11-25 RX ADMIN — VANCOMYCIN HYDROCHLORIDE 1250 MG: 1.25 INJECTION, POWDER, LYOPHILIZED, FOR SOLUTION INTRAVENOUS at 08:11

## 2024-11-25 NOTE — ASSESSMENT & PLAN NOTE
Fever of unknown origin (abdominal/GI related)  - febrile with tachycardia and leukocytosis on admit  - lactic WNL  - source unclear- UA, CXR unremarkable  - COVID/flu negative; HIV neg  - CTH negative  - CT chest/abd/pelvis ordered - Posterior right hepatic lobe heterogeneously enhancing/hypoenhancing area. The abrupt diminished enhancement of posterior right portal vein raises suspicion for thrombosis and associated hepatic infarction.  Other underlying etiology including infection/inflammation not excluded.    Symmetric bilateral perinephric fat stranding with unknown chronicity.  Urinalysis can be attempted to rule out infectious process.   Mild hepatosplenomegaly.   - Obtained ultrasound - negative for thrombosis. Heterogeneous hypoechoic lesion near the dome of the right hepatic lobe posteriorly.  A mass is not excluded.  MRI ordered and evident of some edema around liver without abscess and pylephlebitis, which hints at intra-abdominal infection: continue Vancomycin, Zosyn; no anticoagulation required; obtaining stool studies to assess for culprit as well as GI pathogen PCR panel; considering Infectious Diseases consult once samples return but feel confident in Zosyn; monitoring liver function closely and signs/symptoms of infarct in any bowel region; potential consult to Interventional Radiology for potential drainage of region (may be limited by lack of abscess) for sample evaluation;  planned repeat MRI on 11/25 reveals continued but some improvement in pylephlebitis  - GI pathogen panel pending; notable negatives: Hep A, B, C, Rotavirus, Giardia, Shigella, Salmonella, Yersinia, Cryptosporidium, E. Coli 0157  - continue IV vanc and zosyn for now  - no photophobia or neck rigidity, had joint injections but roughly 8 years ago, no rash, pulmonary symptoms, murmur, or sinus discomfort; difficult to ascertain cause at this stage but improving overall  - will continue to monitor progress and obtain additional  labs as appropriate  - encourage oral hydration

## 2024-11-25 NOTE — PROGRESS NOTES
"[facility] - Internal Medicine [facility] Medicine  Progress Note    Patient Name: Gentry Dowling  MRN: [id number]  Patient Class: IP- Inpatient   Admission Date: 11/17/2024  Length of Stay: 8 days  Attending Physician: Barrett Reno MD  Primary Care Provider: Kishore Erickson MD        Subjective:     Principal Problem:Sepsis without acute organ dysfunction        HPI:  Gentry Dowling is a 48 y.o. male with a PMHx of obesity who presents to [facility] for evaluation of fever. Patient reports fever with associated fatigue, poor appetite, mailase and feeling generally unwell for the past week. He reports mild cough, intermittent right lower quadrant "gas pain" for the past few days. He notices fever usually occurs in the late afternoon which causes difficulty sleeping. He thinks he fell in his sleep and hit his head a few days ago as he woke up with a cut to his head and soreness to the R side of his body. Does not remember the fall and unsure if LOC. Reports intermittent headache which he feels is due to dehydration. Of note, patient reports sinus congestion and pain began about 1 month ago. His mother had similar symptoms about 1 month ago which have since resolved. Denies chest pain, LE swelling, rash, vision changes, N/V, diarrhea. He does mention that he has a family history of leukemia.     Overview/Hospital Course:  ED: Temp 102, , BP 93/63, vitals improved s/p 1L IVFs, tylenol and IV vanc and zosyn. Leukocytosis of WBC 13.6. UA non infectious. POC lactic WNL. CT c/a/p with Posterior right hepatic lobe heterogeneously enhancing/hypoenhancing area.  The abrupt diminished enhancement of posterior right portal vein raises suspicion for thrombosis and associated hepatic infarction.  Other underlying etiology including infection/inflammation not excluded.   Ultrasound negative for portal vein thrombosis. MRI ordered. Pt developed LOGAN which improved with IVF and oral intake.  MRI revealing for pylephlebitis " of portal vessel. Given high mortality associated with this condition, close monitoring for bowel ischemia and other complications necessary and discussed with patient.  However, appropriate treatment ongoing, including treatment with Zosyn.  Given this finding being suggestive of intra-abdominal infection and associated symptoms being present, stool samples obtained and GI PCR panel.    Interval History: Patient endorses continued improvement in symptoms with exception of continued diarrhea.  He remains afebrile.  No additional complaints at this time.    Review of Systems   Constitutional:  Negative for appetite change and fever.   HENT:  Negative for sinus pain and sore throat.    Respiratory:  Negative for cough and shortness of breath.    Cardiovascular:  Negative for chest pain and palpitations.   Gastrointestinal:  Positive for diarrhea. Negative for abdominal distention, abdominal pain, blood in stool, nausea and vomiting.   Neurological:  Negative for light-headedness and headaches.     Objective:     Vital Signs (Most Recent):  Temp: 97.9 °F (36.6 °C) (11/25/24 1147)  Pulse: 82 (11/25/24 1147)  Resp: 18 (11/25/24 1147)  BP: 115/77 (11/25/24 1147)  SpO2: 97 % (11/25/24 1147) Vital Signs (24h Range):  Temp:  [97.9 °F (36.6 °C)-99 °F (37.2 °C)] 97.9 °F (36.6 °C)  Pulse:  [75-95] 82  Resp:  [18] 18  SpO2:  [95 %-97 %] 97 %  BP: (115-129)/(77-81) 115/77     Weight: 109.9 kg (242 lb 4.6 oz)  Body mass index is 34.76 kg/m².    Intake/Output Summary (Last 24 hours) at 11/25/2024 1452  Last data filed at 11/25/2024 1200  Gross per 24 hour   Intake 1080 ml   Output --   Net 1080 ml         Physical Exam  Constitutional:       Appearance: He is not toxic-appearing.   HENT:      Head: Normocephalic and atraumatic.   Abdominal:      General: There is no distension.      Tenderness: There is no abdominal tenderness.   Musculoskeletal:      Right lower leg: No edema.      Left lower leg: No edema.   Skin:     Coloration:  Skin is not jaundiced.   Neurological:      General: No focal deficit present.      Mental Status: He is alert and oriented to person, place, and time.   Psychiatric:         Mood and Affect: Mood normal.         Behavior: Behavior normal.             Significant Labs: All pertinent labs within the past 24 hours have been reviewed.  CBC:   Recent Labs   Lab 11/24/24  0852   WBC 8.05   HGB 13.1*   HCT 38.8*        CMP:   Recent Labs   Lab 11/24/24  0523 11/24/24  0852 11/25/24  0210   NA  --  144  --    K  --  3.5  --    CL  --  108  --    CO2  --  25  --    GLU  --  140*  --    BUN  --  17  --    CREATININE 1.8* 1.8* 1.7*   CALCIUM  --  8.9  --    PROT  --  6.6  --    ALBUMIN  --  2.6*  --    BILITOT  --  0.5  --    ALKPHOS  --  90  --    AST  --  26  --    ALT  --  65*  --    ANIONGAP  --  11  --        Significant Imaging: I have reviewed all pertinent imaging results/findings within the past 24 hours.    Assessment/Plan:      * Sepsis without acute organ dysfunction  Fever of unknown origin (abdominal/GI related)  - febrile with tachycardia and leukocytosis on admit  - lactic WNL  - source unclear- UA, CXR unremarkable  - COVID/flu negative; HIV neg  - CTH negative  - CT chest/abd/pelvis ordered - Posterior right hepatic lobe heterogeneously enhancing/hypoenhancing area. The abrupt diminished enhancement of posterior right portal vein raises suspicion for thrombosis and associated hepatic infarction.  Other underlying etiology including infection/inflammation not excluded.    Symmetric bilateral perinephric fat stranding with unknown chronicity.  Urinalysis can be attempted to rule out infectious process.   Mild hepatosplenomegaly.   - Obtained ultrasound - negative for thrombosis. Heterogeneous hypoechoic lesion near the dome of the right hepatic lobe posteriorly.  A mass is not excluded.  MRI ordered and evident of some edema around liver without abscess and pylephlebitis, which hints at intra-abdominal  infection: continue Vancomycin, Zosyn; no anticoagulation required; obtaining stool studies to assess for culprit as well as GI pathogen PCR panel; considering Infectious Diseases consult once samples return but feel confident in Zosyn; monitoring liver function closely and signs/symptoms of infarct in any bowel region; potential consult to Interventional Radiology for potential drainage of region (may be limited by lack of abscess) for sample evaluation;  planned repeat MRI on 11/25 reveals continued but some improvement in pylephlebitis  - GI pathogen panel pending; notable negatives: Hep A, B, C, Rotavirus, Giardia, Shigella, Salmonella, Yersinia, Cryptosporidium, E. Coli 0157  - continue IV vanc and zosyn for now  - no photophobia or neck rigidity, had joint injections but roughly 8 years ago, no rash, pulmonary symptoms, murmur, or sinus discomfort; difficult to ascertain cause at this stage but improving overall  - will continue to monitor progress and obtain additional labs as appropriate  - encourage oral hydration    LOGAN (acute kidney injury)  LOGAN is likely due to pre-renal azotemia due to dehydration and ?contrast . Baseline creatinine is  unknown . Most recent creatinine and eGFR are listed below.  Recent Labs     11/24/24  0523 11/24/24  0852 11/25/24  0210   CREATININE 1.8* 1.8* 1.7*   EGFRNORACEVR 45.6* 45.6* 48.8*        Plan  - renally dose meds for GFR listed above  - Monitor urine output, serial BMP, and adjust therapy as needed; urine sample for FENA indeterminate; Vanc/Zosyn combination adding to mild renal insult but benefits outweigh risks    Hypokalemia  Patient's most recent potassium results are listed below.   Recent Labs     11/23/24  1009 11/24/24  0852   K 3.6 3.5       Plan  - Replete potassium per protocol  - Monitor potassium Daily  - Patient's hypokalemia is stable    Head trauma  - reports fall which possibly happened in his sleep (?) w/ head trauma, unknown LOC  - will check CTH  given headaches, can also eval sinuses - negative  - neuro checks q4h now discontinued  - alert and fully oriented      VTE Risk Mitigation (From admission, onward)           Ordered     enoxaparin injection 30 mg  Daily         11/23/24 0729     IP VTE HIGH RISK PATIENT  Once         11/17/24 2232                    Discharge Planning   PRESTON: 11/27/2024     Code Status: Full Code   Is the patient medically ready for discharge?: No    Reason for patient still in hospital (select all that apply): Patient trending condition, Laboratory test, and Treatment  Discharge Plan A: Home with family            Barrett Reno MD  Department of Hospital Medicine   [facility] - Internal Medicine Telemetry

## 2024-11-25 NOTE — ASSESSMENT & PLAN NOTE
LOGAN is likely due to pre-renal azotemia due to dehydration and ?contrast . Baseline creatinine is  unknown . Most recent creatinine and eGFR are listed below.  Recent Labs     11/24/24  0523 11/24/24  0852 11/25/24  0210   CREATININE 1.8* 1.8* 1.7*   EGFRNORACEVR 45.6* 45.6* 48.8*        Plan  - renally dose meds for GFR listed above  - Monitor urine output, serial BMP, and adjust therapy as needed; urine sample for FENA indeterminate; Vanc/Zosyn combination adding to mild renal insult but benefits outweigh risks

## 2024-11-25 NOTE — PROGRESS NOTES
Pharmacokinetic Assessment Follow Up: IV Vancomycin    Vancomycin serum concentration assessment(s):    Vancomycin Regimen Plan:    Indication: sepsis  Pulse Dose of 1500 mg IV   Trough Goal: 15-20   Current Level: 9.7 mcg/mL (Random Level drawn 24 hours after the completion of last infusion) ~ estimated half-life 14.36 hours based on calculation  Renal Function: LOGAN Cr 1.8 (baseline 1.1)  Random level on 11/25 at approximately 1000. Re-dose when the random level is less than 20 mcg/mL  Recurring AM random draw canceled because the next vancomycin dose is schedule at 2200. New random scheduled 12 hours after the current dose based on half-life calculation    Drug levels (last 3 results):  Recent Labs   Lab Result Units 11/23/24  0459 11/24/24  0523 11/24/24 2015   Vancomycin, Random ug/mL 10.6 20.0 9.7       Pharmacy will continue to follow and monitor vancomycin.    Please contact pharmacy at extension [phone] for questions regarding this assessment.    Thank you for the consult,   Haja Sheppard       Patient brief summary:  Gentry Dowling is a 49 y.o. male initiated on antimicrobial therapy with IV Vancomycin for treatment of sepsis    Drug Allergies:   Review of patient's allergies indicates:  No Known Allergies    Actual Body Weight:   109.9 kg    Renal Function:   Estimated Creatinine Clearance: 61.6 mL/min (A) (based on SCr of 1.8 mg/dL (H)).,     Dialysis Method (if applicable):  N/A    CBC (last 72 hours):  Recent Labs   Lab Result Units 11/22/24  0913 11/23/24  1009 11/24/24  0852   WBC K/uL 7.92 8.69 8.05   Hemoglobin g/dL 13.0* 13.1* 13.1*   Hematocrit % 39.6* 41.2 38.8*   Platelets K/uL 362 426 358   Gran % % 83.2* 81.4* 82.7*   Lymph % % 10.5* 11.0* 9.9*   Mono % % 3.2* 4.0 3.9*   Eosinophil % % 2.1 2.2 2.1   Basophil % % 0.5 0.9 0.9   Differential Method  Automated Automated Automated       Metabolic Panel (last 72 hours):  Recent Labs   Lab Result Units 11/22/24  0348 11/22/24  0913 11/23/24  0459  11/23/24  1009 11/24/24  0523 11/24/24  0852   Sodium mmol/L  --  140  --  144  --  144   Potassium mmol/L  --  3.4*  --  3.6  --  3.5   Chloride mmol/L  --  106  --  105  --  108   CO2 mmol/L  --  24  --  31*  --  25   Glucose mg/dL  --  147*  --  101  --  140*   BUN mg/dL  --  14  --  15  --  17   Creatinine mg/dL 1.7* 1.7* 1.6* 1.6* 1.8* 1.8*   Albumin g/dL  --  2.4*  --  2.6*  --  2.6*   Total Bilirubin mg/dL  --  0.4  --  0.4  --  0.5   Alkaline Phosphatase U/L  --  94  --  97  --  90   AST U/L  --  27  --  31  --  26   ALT U/L  --  65*  --  68*  --  65*       Vancomycin Administrations:  vancomycin given in the last 96 hours                     vancomycin 1,500 mg in 0.9% NaCl 250 mL IVPB (admixture device) (mg) 1,500 mg New Bag 11/23/24 2051    vancomycin 1,500 mg in 0.9% NaCl 250 mL IVPB (admixture device) (mg) 1,500 mg New Bag 11/22/24 0841    vancomycin 1,250 mg in 0.9% NaCl 250 mL IVPB (admixture device) (mg) 1,250 mg New Bag 11/21/24 0911                    Microbiologic Results:  Microbiology Results (last 7 days)       Procedure Component Value Units Date/Time    Stool culture [[id number]] Collected: 11/22/24 1128    Order Status: Completed Specimen: Stool Updated: 11/24/24 1252     Stool Culture Culture in progress    Blood culture #1 **CANNOT BE ORDERED STAT** [[id number]] Collected: 11/17/24 1749    Order Status: Completed Specimen: Blood from Peripheral, Upper Arm, Right Updated: 11/22/24 2012     Blood Culture, Routine No growth after 5 days.    Blood culture #2 **CANNOT BE ORDERED STAT** [[id number]] Collected: 11/17/24 1749    Order Status: Completed Specimen: Blood from Peripheral, Upper Arm, Right Updated: 11/22/24 2012     Blood Culture, Routine No growth after 5 days.    E. coli 0157 antigen [[id number]] Collected: 11/22/24 1128    Order Status: No result Specimen: Stool Updated: 11/22/24 1251

## 2024-11-25 NOTE — ASSESSMENT & PLAN NOTE
Patient's most recent potassium results are listed below.   Recent Labs     11/23/24  1009 11/24/24  0852   K 3.6 3.5       Plan  - Replete potassium per protocol  - Monitor potassium Daily  - Patient's hypokalemia is stable

## 2024-11-25 NOTE — SUBJECTIVE & OBJECTIVE
Interval History: Patient endorses continued improvement in symptoms with exception of continued diarrhea.  He remains afebrile.  No additional complaints at this time.    Review of Systems   Constitutional:  Negative for appetite change and fever.   HENT:  Negative for sinus pain and sore throat.    Respiratory:  Negative for cough and shortness of breath.    Cardiovascular:  Negative for chest pain and palpitations.   Gastrointestinal:  Positive for diarrhea. Negative for abdominal distention, abdominal pain, blood in stool, nausea and vomiting.   Neurological:  Negative for light-headedness and headaches.     Objective:     Vital Signs (Most Recent):  Temp: 97.9 °F (36.6 °C) (11/25/24 1147)  Pulse: 82 (11/25/24 1147)  Resp: 18 (11/25/24 1147)  BP: 115/77 (11/25/24 1147)  SpO2: 97 % (11/25/24 1147) Vital Signs (24h Range):  Temp:  [97.9 °F (36.6 °C)-99 °F (37.2 °C)] 97.9 °F (36.6 °C)  Pulse:  [75-95] 82  Resp:  [18] 18  SpO2:  [95 %-97 %] 97 %  BP: (115-129)/(77-81) 115/77     Weight: 109.9 kg (242 lb 4.6 oz)  Body mass index is 34.76 kg/m².    Intake/Output Summary (Last 24 hours) at 11/25/2024 1452  Last data filed at 11/25/2024 1200  Gross per 24 hour   Intake 1080 ml   Output --   Net 1080 ml         Physical Exam  Constitutional:       Appearance: He is not toxic-appearing.   HENT:      Head: Normocephalic and atraumatic.   Abdominal:      General: There is no distension.      Tenderness: There is no abdominal tenderness.   Musculoskeletal:      Right lower leg: No edema.      Left lower leg: No edema.   Skin:     Coloration: Skin is not jaundiced.   Neurological:      General: No focal deficit present.      Mental Status: He is alert and oriented to person, place, and time.   Psychiatric:         Mood and Affect: Mood normal.         Behavior: Behavior normal.             Significant Labs: All pertinent labs within the past 24 hours have been reviewed.  CBC:   Recent Labs   Lab 11/24/24  0852   WBC 8.05    HGB 13.1*   HCT 38.8*        CMP:   Recent Labs   Lab 11/24/24  0523 11/24/24  0852 11/25/24  0210   NA  --  144  --    K  --  3.5  --    CL  --  108  --    CO2  --  25  --    GLU  --  140*  --    BUN  --  17  --    CREATININE 1.8* 1.8* 1.7*   CALCIUM  --  8.9  --    PROT  --  6.6  --    ALBUMIN  --  2.6*  --    BILITOT  --  0.5  --    ALKPHOS  --  90  --    AST  --  26  --    ALT  --  65*  --    ANIONGAP  --  11  --        Significant Imaging: I have reviewed all pertinent imaging results/findings within the past 24 hours.

## 2024-11-26 PROBLEM — K75.1: Status: ACTIVE | Noted: 2024-11-26

## 2024-11-26 LAB
ALBUMIN SERPL BCP-MCNC: 2.6 G/DL (ref 3.5–5.2)
ALP SERPL-CCNC: 80 U/L (ref 40–150)
ALT SERPL W/O P-5'-P-CCNC: 49 U/L (ref 10–44)
ANION GAP SERPL CALC-SCNC: 10 MMOL/L (ref 8–16)
AST SERPL-CCNC: 17 U/L (ref 10–40)
BASOPHILS # BLD AUTO: 0.08 K/UL (ref 0–0.2)
BASOPHILS NFR BLD: 1 % (ref 0–1.9)
BILIRUB SERPL-MCNC: 0.3 MG/DL (ref 0.1–1)
BUN SERPL-MCNC: 18 MG/DL (ref 6–20)
CALCIUM SERPL-MCNC: 8.7 MG/DL (ref 8.7–10.5)
CHLORIDE SERPL-SCNC: 110 MMOL/L (ref 95–110)
CO2 SERPL-SCNC: 23 MMOL/L (ref 23–29)
CREAT SERPL-MCNC: 1.6 MG/DL (ref 0.5–1.4)
DIFFERENTIAL METHOD BLD: ABNORMAL
EOSINOPHIL # BLD AUTO: 0.2 K/UL (ref 0–0.5)
EOSINOPHIL NFR BLD: 2.6 % (ref 0–8)
ERYTHROCYTE [DISTWIDTH] IN BLOOD BY AUTOMATED COUNT: 13.1 % (ref 11.5–14.5)
EST. GFR  (NO RACE VARIABLE): 52.5 ML/MIN/1.73 M^2
GLUCOSE SERPL-MCNC: 102 MG/DL (ref 70–110)
H PYLORI AG STL QL IA: NOT DETECTED
HCT VFR BLD AUTO: 37.2 % (ref 40–54)
HGB BLD-MCNC: 12 G/DL (ref 14–18)
IMM GRANULOCYTES # BLD AUTO: 0.03 K/UL (ref 0–0.04)
IMM GRANULOCYTES NFR BLD AUTO: 0.4 % (ref 0–0.5)
LACTATE SERPL-SCNC: 0.7 MMOL/L (ref 0.5–2.2)
LYMPHOCYTES # BLD AUTO: 1.1 K/UL (ref 1–4.8)
LYMPHOCYTES NFR BLD: 12.6 % (ref 18–48)
MAGNESIUM SERPL-MCNC: 2.1 MG/DL (ref 1.6–2.6)
MCH RBC QN AUTO: 28.6 PG (ref 27–31)
MCHC RBC AUTO-ENTMCNC: 32.3 G/DL (ref 32–36)
MCV RBC AUTO: 89 FL (ref 82–98)
MONOCYTES # BLD AUTO: 0.5 K/UL (ref 0.3–1)
MONOCYTES NFR BLD: 5.9 % (ref 4–15)
NEUTROPHILS # BLD AUTO: 6.5 K/UL (ref 1.8–7.7)
NEUTROPHILS NFR BLD: 77.5 % (ref 38–73)
NRBC BLD-RTO: 0 /100 WBC
PHOSPHATE SERPL-MCNC: 4.3 MG/DL (ref 2.7–4.5)
PLATELET # BLD AUTO: 374 K/UL (ref 150–450)
PMV BLD AUTO: 9.4 FL (ref 9.2–12.9)
POTASSIUM SERPL-SCNC: 3.9 MMOL/L (ref 3.5–5.1)
PROT SERPL-MCNC: 6.3 G/DL (ref 6–8.4)
RBC # BLD AUTO: 4.19 M/UL (ref 4.6–6.2)
SODIUM SERPL-SCNC: 143 MMOL/L (ref 136–145)
WBC # BLD AUTO: 8.32 K/UL (ref 3.9–12.7)

## 2024-11-26 PROCEDURE — 85025 COMPLETE CBC W/AUTO DIFF WBC: CPT | Performed by: STUDENT IN AN ORGANIZED HEALTH CARE EDUCATION/TRAINING PROGRAM

## 2024-11-26 PROCEDURE — 25000003 PHARM REV CODE 250: Performed by: STUDENT IN AN ORGANIZED HEALTH CARE EDUCATION/TRAINING PROGRAM

## 2024-11-26 PROCEDURE — 99223 1ST HOSP IP/OBS HIGH 75: CPT | Mod: ,,, | Performed by: INTERNAL MEDICINE

## 2024-11-26 PROCEDURE — 63600175 PHARM REV CODE 636 W HCPCS: Performed by: PHYSICIAN ASSISTANT

## 2024-11-26 PROCEDURE — 25000003 PHARM REV CODE 250: Performed by: PHYSICIAN ASSISTANT

## 2024-11-26 PROCEDURE — 83605 ASSAY OF LACTIC ACID: CPT | Performed by: STUDENT IN AN ORGANIZED HEALTH CARE EDUCATION/TRAINING PROGRAM

## 2024-11-26 PROCEDURE — 36415 COLL VENOUS BLD VENIPUNCTURE: CPT | Performed by: STUDENT IN AN ORGANIZED HEALTH CARE EDUCATION/TRAINING PROGRAM

## 2024-11-26 PROCEDURE — 63600175 PHARM REV CODE 636 W HCPCS: Performed by: STUDENT IN AN ORGANIZED HEALTH CARE EDUCATION/TRAINING PROGRAM

## 2024-11-26 PROCEDURE — 84100 ASSAY OF PHOSPHORUS: CPT | Performed by: STUDENT IN AN ORGANIZED HEALTH CARE EDUCATION/TRAINING PROGRAM

## 2024-11-26 PROCEDURE — 36573 INSJ PICC RS&I 5 YR+: CPT

## 2024-11-26 PROCEDURE — 76937 US GUIDE VASCULAR ACCESS: CPT

## 2024-11-26 PROCEDURE — 21400001 HC TELEMETRY ROOM

## 2024-11-26 PROCEDURE — 27000207 HC ISOLATION

## 2024-11-26 PROCEDURE — 80053 COMPREHEN METABOLIC PANEL: CPT | Performed by: STUDENT IN AN ORGANIZED HEALTH CARE EDUCATION/TRAINING PROGRAM

## 2024-11-26 PROCEDURE — 02HV33Z INSERTION OF INFUSION DEVICE INTO SUPERIOR VENA CAVA, PERCUTANEOUS APPROACH: ICD-10-PCS | Performed by: STUDENT IN AN ORGANIZED HEALTH CARE EDUCATION/TRAINING PROGRAM

## 2024-11-26 PROCEDURE — 83735 ASSAY OF MAGNESIUM: CPT | Performed by: STUDENT IN AN ORGANIZED HEALTH CARE EDUCATION/TRAINING PROGRAM

## 2024-11-26 PROCEDURE — C1751 CATH, INF, PER/CENT/MIDLINE: HCPCS

## 2024-11-26 RX ORDER — LORAZEPAM 2 MG/ML
1 INJECTION INTRAMUSCULAR ONCE AS NEEDED
Status: COMPLETED | OUTPATIENT
Start: 2024-11-26 | End: 2024-11-26

## 2024-11-26 RX ORDER — SODIUM CHLORIDE 0.9 % (FLUSH) 0.9 %
10 SYRINGE (ML) INJECTION EVERY 12 HOURS PRN
Status: DISCONTINUED | OUTPATIENT
Start: 2024-11-26 | End: 2024-11-27 | Stop reason: HOSPADM

## 2024-11-26 RX ORDER — CEFTRIAXONE 2 G/1
2 INJECTION, POWDER, FOR SOLUTION INTRAMUSCULAR; INTRAVENOUS
Status: DISCONTINUED | OUTPATIENT
Start: 2024-11-26 | End: 2024-11-27

## 2024-11-26 RX ORDER — LIDOCAINE HYDROCHLORIDE 10 MG/ML
1 INJECTION, SOLUTION INFILTRATION; PERINEURAL ONCE AS NEEDED
Status: DISCONTINUED | OUTPATIENT
Start: 2024-11-26 | End: 2024-11-27 | Stop reason: HOSPADM

## 2024-11-26 RX ADMIN — GUAIFENESIN AND DEXTROMETHORPHAN HYDROBROMIDE 2 TABLET: 600; 30 TABLET, EXTENDED RELEASE ORAL at 09:11

## 2024-11-26 RX ADMIN — Medication 300 ML: at 03:11

## 2024-11-26 RX ADMIN — Medication 300 ML: at 07:11

## 2024-11-26 RX ADMIN — Medication 300 ML: at 11:11

## 2024-11-26 RX ADMIN — CEFTRIAXONE SODIUM 2 G: 2 INJECTION, POWDER, FOR SOLUTION INTRAMUSCULAR; INTRAVENOUS at 10:11

## 2024-11-26 RX ADMIN — PIPERACILLIN SODIUM AND TAZOBACTAM SODIUM 4.5 G: 4; .5 INJECTION, POWDER, FOR SOLUTION INTRAVENOUS at 04:11

## 2024-11-26 RX ADMIN — PIPERACILLIN SODIUM AND TAZOBACTAM SODIUM 4.5 G: 4; .5 INJECTION, POWDER, FOR SOLUTION INTRAVENOUS at 12:11

## 2024-11-26 RX ADMIN — PIPERACILLIN SODIUM AND TAZOBACTAM SODIUM 4.5 G: 4; .5 INJECTION, POWDER, FOR SOLUTION INTRAVENOUS at 09:11

## 2024-11-26 RX ADMIN — LORAZEPAM 1 MG: 2 INJECTION INTRAMUSCULAR; INTRAVENOUS at 06:11

## 2024-11-26 RX ADMIN — Medication 6 MG: at 10:11

## 2024-11-26 RX ADMIN — Medication 1 CAPSULE: at 09:11

## 2024-11-26 RX ADMIN — GUAIFENESIN AND DEXTROMETHORPHAN HYDROBROMIDE 2 TABLET: 600; 30 TABLET, EXTENDED RELEASE ORAL at 10:11

## 2024-11-26 RX ADMIN — FLUTICASONE PROPIONATE 100 MCG: 50 SPRAY, METERED NASAL at 09:11

## 2024-11-26 NOTE — HPI
"Mr. Dowling is a 49M with no significant PMH presented with fever and generalized malaise. Infectious disease consulted for "if further workup needed in patient with fever of unknown origin; likely GI related given Calprotectin elevation and hepatic inflammation with pylephlebitis".     Patient was febrile to 102 on admission, last fever was 101.6 on 11/19. Has been on vancomycin and zosyn since 11/17. Blood and stool cultures negative. UA unremarkable. CT CAP on admission with possible R portal vein thrombosis, but not visualized on US. MRI abdomen with pylephlebitis, with slight improvement on repeat MRI yesterday.       "

## 2024-11-26 NOTE — ASSESSMENT & PLAN NOTE
Fever of unknown origin (abdominal/GI related)  - febrile with tachycardia and leukocytosis on admit  - lactic WNL  - source unclear- UA, CXR unremarkable  - COVID/flu negative; HIV neg  - CTH negative  - CT chest/abd/pelvis ordered - Posterior right hepatic lobe heterogeneously enhancing/hypoenhancing area. The abrupt diminished enhancement of posterior right portal vein raises suspicion for thrombosis and associated hepatic infarction.  Other underlying etiology including infection/inflammation not excluded.    Symmetric bilateral perinephric fat stranding with unknown chronicity.  Urinalysis can be attempted to rule out infectious process.   Mild hepatosplenomegaly.   - Obtained ultrasound - negative for thrombosis. Heterogeneous hypoechoic lesion near the dome of the right hepatic lobe posteriorly.  A mass is not excluded.  MRI ordered and evident of some edema around liver without abscess and pylephlebitis, which hints at intra-abdominal infection: continue Vancomycin, Zosyn; no anticoagulation required; obtained stool studies to assess for culprit as well as GI pathogen PCR panel; Infectious Diseases consult once samples return but feel confident in Zosyn; monitoring liver function closely and signs/symptoms of infarct in any bowel region; potential consult to Interventional Radiology for potential drainage of region (may be limited by lack of abscess) for sample evaluation;  planned repeat MRI on 11/25 reveals continued but some improvement in pylephlebitis  - GI pathogen panel pending; notable negatives: Hep A, B, C, Rotavirus, Giardia, Shigella, Salmonella, Yersinia, Cryptosporidium, E. Coli 0157  - no photophobia or neck rigidity, had joint injections but roughly 8 years ago, no rash, pulmonary symptoms, murmur, or sinus discomfort; difficult to ascertain cause at this stage but improving overall  - will continue to monitor progress and obtain additional labs as appropriate  - encourage oral hydration  -  continue IV vanc and zosyn for now but low threshold to stop antibiotics given clinical improvement, negative blood cultures, complete lack of repeat fever, normalized WBC; consult to Infectious Diseases and appreciate their assessment

## 2024-11-26 NOTE — PLAN OF CARE
AAOx4; POC reviewed & verbalizes understanding.  Admit DX: sepsis  Afebrile. No acute events on shift. No deficits noted  IV access & IVF: PIV saline flushed & locked  BM:11/25/24  : WNL  Appetite: adequate  Abd MRI today  Telemetry D/C  Bed alarm set; fall precautions maintained.   Bed locked in lowest position, side rails up x2, call light within reach, environment clear. Encouraged pt to call nurse with any concerns.  Safety measures maintained

## 2024-11-26 NOTE — PROGRESS NOTES
"[facility] - Internal Medicine [facility] Medicine  Progress Note    Patient Name: Gentry Dowling  MRN: [id number]  Patient Class: IP- Inpatient   Admission Date: 11/17/2024  Length of Stay: 9 days  Attending Physician: Barrett Reno MD  Primary Care Provider: Kishore Erickson MD        Subjective:     Principal Problem:Sepsis without acute organ dysfunction        HPI:  Gentry Dowling is a 48 y.o. male with a PMHx of obesity who presents to [facility] for evaluation of fever. Patient reports fever with associated fatigue, poor appetite, mailase and feeling generally unwell for the past week. He reports mild cough, intermittent right lower quadrant "gas pain" for the past few days. He notices fever usually occurs in the late afternoon which causes difficulty sleeping. He thinks he fell in his sleep and hit his head a few days ago as he woke up with a cut to his head and soreness to the R side of his body. Does not remember the fall and unsure if LOC. Reports intermittent headache which he feels is due to dehydration. Of note, patient reports sinus congestion and pain began about 1 month ago. His mother had similar symptoms about 1 month ago which have since resolved. Denies chest pain, LE swelling, rash, vision changes, N/V, diarrhea. He does mention that he has a family history of leukemia.     Overview/Hospital Course:  ED: Temp 102, , BP 93/63, vitals improved s/p 1L IVFs, tylenol and IV vanc and zosyn. Leukocytosis of WBC 13.6. UA non infectious. POC lactic WNL. CT c/a/p with Posterior right hepatic lobe heterogeneously enhancing/hypoenhancing area.  The abrupt diminished enhancement of posterior right portal vein raises suspicion for thrombosis and associated hepatic infarction.  Other underlying etiology including infection/inflammation not excluded.   Ultrasound negative for portal vein thrombosis. MRI ordered. Pt developed LOGAN which improved with IVF and oral intake.  MRI revealing for pylephlebitis " of portal vessel. Given high mortality associated with this condition, close monitoring for bowel ischemia and other complications necessary and discussed with patient.  However, appropriate treatment ongoing, including treatment with Zosyn.  Given this finding being suggestive of intra-abdominal infection and associated symptoms being present, stool samples obtained and GI PCR panel.    Interval History: Patient with continued episodes of diarrhea overnight but fewer in occurrence.  He remains afebrile and denies abdominal pain.  He has no additional complaints at this time.    Review of Systems   Constitutional:  Negative for diaphoresis and fever.   HENT:  Negative for sore throat.    Respiratory:  Negative for cough and shortness of breath.    Cardiovascular:  Negative for chest pain and palpitations.   Gastrointestinal:  Positive for diarrhea. Negative for abdominal pain, nausea and vomiting.   Genitourinary:  Negative for dysuria.   Neurological:  Negative for light-headedness and headaches.   Psychiatric/Behavioral:  Negative for agitation and behavioral problems.      Objective:     Vital Signs (Most Recent):  Temp: 98.5 °F (36.9 °C) (11/26/24 1142)  Pulse: 82 (11/26/24 1142)  Resp: 19 (11/26/24 1142)  BP: 122/88 (11/26/24 1142)  SpO2: (!) 94 % (11/26/24 1142) Vital Signs (24h Range):  Temp:  [97.9 °F (36.6 °C)-99.3 °F (37.4 °C)] 98.5 °F (36.9 °C)  Pulse:  [] 82  Resp:  [18-19] 19  SpO2:  [94 %-98 %] 94 %  BP: (115-150)/(80-93) 122/88     Weight: 109.9 kg (242 lb 4.6 oz)  Body mass index is 34.76 kg/m².    Intake/Output Summary (Last 24 hours) at 11/26/2024 1153  Last data filed at 11/26/2024 0800  Gross per 24 hour   Intake 1080 ml   Output --   Net 1080 ml         Physical Exam  Constitutional:       Appearance: He is not toxic-appearing.   HENT:      Head: Normocephalic and atraumatic.   Eyes:      General: No scleral icterus.  Cardiovascular:      Rate and Rhythm: Normal rate and regular rhythm.    Abdominal:      General: Bowel sounds are normal.      Tenderness: There is no abdominal tenderness.   Musculoskeletal:      Right lower leg: No edema.      Left lower leg: No edema.   Skin:     Coloration: Skin is not jaundiced.   Neurological:      General: No focal deficit present.      Mental Status: He is alert and oriented to person, place, and time.   Psychiatric:         Mood and Affect: Mood normal.         Behavior: Behavior normal.             Significant Labs: All pertinent labs within the past 24 hours have been reviewed.  CBC:   Recent Labs   Lab 11/26/24 0220   WBC 8.32   HGB 12.0*   HCT 37.2*        CMP:   Recent Labs   Lab 11/25/24 0210 11/26/24 0220   NA  --  143   K  --  3.9   CL  --  110   CO2  --  23   GLU  --  102   BUN  --  18   CREATININE 1.7* 1.6*   CALCIUM  --  8.7   PROT  --  6.3   ALBUMIN  --  2.6*   BILITOT  --  0.3   ALKPHOS  --  80   AST  --  17   ALT  --  49*   ANIONGAP  --  10       Significant Imaging: I have reviewed all pertinent imaging results/findings within the past 24 hours.    MRI Abdomen 11/24:  Impression:     Continued branching tubular areas of hypoenhancement in the right posterior liver with adjacent edema, overall slightly improved and remains consistent pylephlebitis.  No abscess.  Recommend continued follow-up.    Assessment/Plan:      * Sepsis without acute organ dysfunction  Fever of unknown origin (abdominal/GI related)  - febrile with tachycardia and leukocytosis on admit  - lactic WNL  - source unclear- UA, CXR unremarkable  - COVID/flu negative; HIV neg  - CTH negative  - CT chest/abd/pelvis ordered - Posterior right hepatic lobe heterogeneously enhancing/hypoenhancing area. The abrupt diminished enhancement of posterior right portal vein raises suspicion for thrombosis and associated hepatic infarction.  Other underlying etiology including infection/inflammation not excluded.    Symmetric bilateral perinephric fat stranding with unknown  chronicity.  Urinalysis can be attempted to rule out infectious process.   Mild hepatosplenomegaly.   - Obtained ultrasound - negative for thrombosis. Heterogeneous hypoechoic lesion near the dome of the right hepatic lobe posteriorly.  A mass is not excluded.  MRI ordered and evident of some edema around liver without abscess and pylephlebitis, which hints at intra-abdominal infection: continue Vancomycin, Zosyn; no anticoagulation required; obtained stool studies to assess for culprit as well as GI pathogen PCR panel; Infectious Diseases consult once samples return but feel confident in Zosyn; monitoring liver function closely and signs/symptoms of infarct in any bowel region; potential consult to Interventional Radiology for potential drainage of region (may be limited by lack of abscess) for sample evaluation;  planned repeat MRI on 11/25 reveals continued but some improvement in pylephlebitis  - GI pathogen panel pending; notable negatives: Hep A, B, C, Rotavirus, Giardia, Shigella, Salmonella, Yersinia, Cryptosporidium, E. Coli 0157  - no photophobia or neck rigidity, had joint injections but roughly 8 years ago, no rash, pulmonary symptoms, murmur, or sinus discomfort; difficult to ascertain cause at this stage but improving overall  - will continue to monitor progress and obtain additional labs as appropriate  - encourage oral hydration  - continue IV vanc and zosyn for now but low threshold to stop antibiotics given clinical improvement, negative blood cultures, complete lack of repeat fever, normalized WBC; consult to Infectious Diseases and appreciate their assessment    LOGAN (acute kidney injury)  LOGAN is likely due to pre-renal azotemia due to dehydration and ?contrast . Baseline creatinine is  unknown . Most recent creatinine and eGFR are listed below.  Recent Labs     11/24/24  0852 11/25/24  0210 11/26/24  0220   CREATININE 1.8* 1.7* 1.6*   EGFRNORACEVR 45.6* 48.8* 52.5*        Plan  - renally dose  meds for GFR listed above  - Monitor urine output, serial BMP, and adjust therapy as needed; urine sample for FENA indeterminate; Vanc/Zosyn combination adding to mild renal insult but benefits outweigh risks  - improving and likely to improve further once antibiotics are discontinued    Pylephlebitis  - discovered on MRI Abdomen, which led to suspicion for intra-abdominal infection  - hepatic function monitored closely and maintained  -  discussed case with GI and endorse no intervention aside from treating infection  - repeat MRI with some improvement  - counseled family on importance of relaying this to follow up PCP in event may want to repeat imaging in future or close monitoring of hepatic function  - treatment as above      Hypokalemia  Patient's most recent potassium results are listed below.   Recent Labs     11/24/24  0852 11/26/24  0220   K 3.5 3.9       Plan  - Replete potassium per protocol  - Monitor potassium Daily  - Patient's hypokalemia is stable    Head trauma  - reports fall which possibly happened in his sleep (?) w/ head trauma, unknown LOC  - will check CTH given headaches, can also eval sinuses - negative  - neuro checks q4h now discontinued  - alert and fully oriented      VTE Risk Mitigation (From admission, onward)           Ordered     enoxaparin injection 30 mg  Daily         11/23/24 0729     IP VTE HIGH RISK PATIENT  Once         11/17/24 2232                    Discharge Planning   PRESTON: 11/27/2024     Code Status: Full Code   Is the patient medically ready for discharge?: Potentially today    Reason for patient still in hospital (select all that apply): Treatment  Discharge Plan A: Home with family              Barrett Reno MD  Department of Hospital Medicine   [facility] - Internal Medicine Telemetry

## 2024-11-26 NOTE — CONSULTS
"[facility]  Infectious Disease  Consult Note    Patient Name: Gentry Dowling  MRN: [id number]  Admission Date: 11/17/2024  Hospital Length of Stay: 9 days  Attending Physician: Barrett Reno MD  Primary Care Provider: Kishore Erickson MD     Isolation Status: Special Contact    Patient information was obtained from patient and ER records.      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 Was febrile to 102 on admission, last fever was 101.6 on 11/19. Has been on vancomycin and zosyn since 11/17. Repeat MRI with slight improvement of pyelephlebitis.     Unclear cause of pyelephlebitis, but there is some literature showing infectious causes (E. Coli, Klebsiella, etc). Favor treating for 6 week course for vascular infection.     Recommendations  Stop vancomycin and zosyn  Start ceftriaxone 2g daily  Duration 6 weeks (11/17 - 12/29)        Thank you for your consult. I will sign off. Please contact us if you have any additional questions.    Luz Mckeon DO  Infectious Disease  [facility]    Subjective:     Principal Problem: Sepsis without acute organ dysfunction    HPI: Mr. Dowling is a 49M with no significant PMH presented with fever and generalized malaise. Infectious disease consulted for "if further workup needed in patient with fever of unknown origin; likely GI related given Calprotectin elevation and hepatic inflammation with pylephlebitis".     Patient was febrile to 102 on admission, last fever was 101.6 on 11/19. Has been on vancomycin and zosyn since 11/17. Blood and stool cultures negative. UA unremarkable. CT CAP on admission with possible R portal vein thrombosis, but not visualized on US. MRI abdomen with " "pylephlebitis, with slight improvement on repeat MRI yesterday.         Past Medical History:   Diagnosis Date    Pylephlebitis 11/26/2024       History reviewed. No pertinent surgical history.    Review of patient's allergies indicates:  No Known Allergies    Medications:  Medications Prior to Admission   Medication Sig    albuterol (PROVENTIL/VENTOLIN HFA) 90 mcg/actuation inhaler albuterol sulfate HFA 90 mcg/actuation aerosol inhaler   INHALE 1 PUFF BY MOUTH EVERY 4 TO 6 HOURS AS NEEDED FOR SHORTNESS OF BREATH    azelastine (ASTELIN) 137 mcg (0.1 %) nasal spray 1 spray (137 mcg total) by Nasal route 2 (two) times daily.    fluticasone propionate (FLONASE) 50 mcg/actuation nasal spray 2 sprays (100 mcg total) by Each Nostril route 2 (two) times daily.    sertraline (ZOLOFT) 100 MG tablet sertraline 100 mg tablet   TAKE 1 AND 1/2 TABLETS BY MOUTH EVERY MORNING     Antibiotics (From admission, onward)      Start     Stop Route Frequency Ordered    11/18/24 0330  piperacillin-tazobactam (ZOSYN) 4.5 g in D5W 100 mL IVPB (MB+)         -- IV Every 8 hours (non-standard times) 11/17/24 2359    11/18/24 0058  vancomycin - pharmacy to dose  (vancomycin IVPB (PEDS and ADULTS))        Placed in "And" Linked Group    -- IV pharmacy to manage frequency 11/17/24 2358          Antifungals (From admission, onward)      None          Antivirals (From admission, onward)      None             Immunization History   Administered Date(s) Administered    COVID-19, MRNA, LN-S, PF (Pfizer) (Purple Cap) 03/20/2021, 04/11/2021       Family History    None       Social History     Socioeconomic History    Marital status: Single   Tobacco Use    Smoking status: Some Days     Types: Cigars    Smokeless tobacco: Former     Types: Chew     Social Drivers of Health     Financial Resource Strain: Medium Risk (11/18/2024)    Overall Financial Resource Strain (CARDIA)     Difficulty of Paying Living Expenses: Somewhat hard   Food Insecurity: Food " Insecurity Present (11/18/2024)    Hunger Vital Sign     Worried About Running Out of Food in the Last Year: Sometimes true     Ran Out of Food in the Last Year: Sometimes true   Transportation Needs: No Transportation Needs (11/18/2024)    TRANSPORTATION NEEDS     Transportation : No   Stress: Stress Concern Present (11/18/2024)    [employer] - Occupational Stress Questionnaire     Feeling of Stress : To some extent   Housing Stability: High Risk (11/18/2024)    Housing Stability Vital Sign     Unable to Pay for Housing in the Last Year: Yes     Homeless in the Last Year: No     Review of Systems   Constitutional:  Negative for chills and fever.   Respiratory:  Negative for cough and shortness of breath.    Cardiovascular:  Negative for chest pain, palpitations and leg swelling.   Gastrointestinal:  Negative for abdominal pain, constipation, diarrhea, nausea and vomiting.   Genitourinary:  Negative for dysuria and hematuria.   Musculoskeletal:  Negative for arthralgias and myalgias.   Skin:  Negative for rash.   Neurological:  Negative for weakness and headaches.     Objective:     Vital Signs (Most Recent):  Temp: 98.7 °F (37.1 °C) (11/26/24 1530)  Pulse: 87 (11/26/24 1530)  Resp: 19 (11/26/24 1530)  BP: 124/81 (11/26/24 1530)  SpO2: (!) 94 % (11/26/24 1530) Vital Signs (24h Range):  Temp:  [97.9 °F (36.6 °C)-99.3 °F (37.4 °C)] 98.7 °F (37.1 °C)  Pulse:  [] 87  Resp:  [18-19] 19  SpO2:  [94 %-98 %] 94 %  BP: (115-150)/(80-93) 124/81     Weight: 109.9 kg (242 lb 4.6 oz)  Body mass index is 34.76 kg/m².    Estimated Creatinine Clearance: 69.4 mL/min (A) (based on SCr of 1.6 mg/dL (H)).     Physical Exam  Vitals reviewed.   Constitutional:       General: He is not in acute distress.     Appearance: Normal appearance. He is not ill-appearing.   HENT:      Head: Normocephalic and atraumatic.   Eyes:      Extraocular Movements: Extraocular movements intact.      Conjunctiva/sclera:  Conjunctivae normal.   Cardiovascular:      Rate and Rhythm: Normal rate and regular rhythm.      Heart sounds: No murmur heard.  Pulmonary:      Effort: Pulmonary effort is normal. No respiratory distress.      Breath sounds: Normal breath sounds.   Abdominal:      General: Abdomen is flat. Bowel sounds are normal.      Palpations: Abdomen is soft.      Tenderness: There is no abdominal tenderness.   Musculoskeletal:      Cervical back: Normal range of motion.   Skin:     General: Skin is warm and dry.   Neurological:      General: No focal deficit present.      Mental Status: He is alert and oriented to person, place, and time.   Psychiatric:         Mood and Affect: Mood normal.         Behavior: Behavior normal.         Thought Content: Thought content normal.          Significant Labs: All pertinent labs within the past 24 hours have been reviewed.  Recent Lab Results         11/26/24  0220        Albumin 2.6       ALP 80       ALT 49       Anion Gap 10       AST 17       Baso # 0.08       Basophil % 1.0       BILIRUBIN TOTAL 0.3  Comment: For infants and newborns, interpretation of results should be based  on gestational age, weight and in agreement with clinical  observations.    Premature Infant recommended reference ranges:  Up to 24 hours.............<8.0 mg/dL  Up to 48 hours............<12.0 mg/dL  3-5 days..................<15.0 mg/dL  6-29 days.................<15.0 mg/dL         BUN 18       Calcium 8.7       Chloride 110       CO2 23       Creatinine 1.6       Differential Method Automated       eGFR 52.5       Eos # 0.2       Eos % 2.6       Glucose 102       Gran # (ANC) 6.5       Gran % 77.5       Hematocrit 37.2       Hemoglobin 12.0       Immature Grans (Abs) 0.03  Comment: Mild elevation in immature granulocytes is non specific and   can be seen in a variety of conditions including stress response,   acute inflammation, trauma and pregnancy. Correlation with other   laboratory and clinical  findings is essential.         Immature Granulocytes 0.4       Lactic Acid Level 0.7  Comment: Falsely low lactic acid results can be found in samples   containing >=13.0 mg/dL total bilirubin and/or >=3.5 mg/dL   direct bilirubin.         Lymph # 1.1       Lymph % 12.6       Magnesium  2.1       MCH 28.6       MCHC 32.3       MCV 89       Mono # 0.5       Mono % 5.9       MPV 9.4       nRBC 0       Phosphorus Level 4.3       Platelet Count 374       Potassium 3.9       PROTEIN TOTAL 6.3       RBC 4.19       RDW 13.1       Sodium 143       WBC 8.32               Significant Imaging: I have reviewed all pertinent imaging results/findings within the past 24 hours.

## 2024-11-26 NOTE — SUBJECTIVE & OBJECTIVE
Interval History: Patient with continued episodes of diarrhea overnight but fewer in occurrence.  He remains afebrile and denies abdominal pain.  He has no additional complaints at this time.    Review of Systems   Constitutional:  Negative for diaphoresis and fever.   HENT:  Negative for sore throat.    Respiratory:  Negative for cough and shortness of breath.    Cardiovascular:  Negative for chest pain and palpitations.   Gastrointestinal:  Positive for diarrhea. Negative for abdominal pain, nausea and vomiting.   Genitourinary:  Negative for dysuria.   Neurological:  Negative for light-headedness and headaches.   Psychiatric/Behavioral:  Negative for agitation and behavioral problems.      Objective:     Vital Signs (Most Recent):  Temp: 98.5 °F (36.9 °C) (11/26/24 1142)  Pulse: 82 (11/26/24 1142)  Resp: 19 (11/26/24 1142)  BP: 122/88 (11/26/24 1142)  SpO2: (!) 94 % (11/26/24 1142) Vital Signs (24h Range):  Temp:  [97.9 °F (36.6 °C)-99.3 °F (37.4 °C)] 98.5 °F (36.9 °C)  Pulse:  [] 82  Resp:  [18-19] 19  SpO2:  [94 %-98 %] 94 %  BP: (115-150)/(80-93) 122/88     Weight: 109.9 kg (242 lb 4.6 oz)  Body mass index is 34.76 kg/m².    Intake/Output Summary (Last 24 hours) at 11/26/2024 1153  Last data filed at 11/26/2024 0800  Gross per 24 hour   Intake 1080 ml   Output --   Net 1080 ml         Physical Exam  Constitutional:       Appearance: He is not toxic-appearing.   HENT:      Head: Normocephalic and atraumatic.   Eyes:      General: No scleral icterus.  Cardiovascular:      Rate and Rhythm: Normal rate and regular rhythm.   Abdominal:      General: Bowel sounds are normal.      Tenderness: There is no abdominal tenderness.   Musculoskeletal:      Right lower leg: No edema.      Left lower leg: No edema.   Skin:     Coloration: Skin is not jaundiced.   Neurological:      General: No focal deficit present.      Mental Status: He is alert and oriented to person, place, and time.   Psychiatric:         Mood and  Affect: Mood normal.         Behavior: Behavior normal.             Significant Labs: All pertinent labs within the past 24 hours have been reviewed.  CBC:   Recent Labs   Lab 11/26/24 0220   WBC 8.32   HGB 12.0*   HCT 37.2*        CMP:   Recent Labs   Lab 11/25/24 0210 11/26/24 0220   NA  --  143   K  --  3.9   CL  --  110   CO2  --  23   GLU  --  102   BUN  --  18   CREATININE 1.7* 1.6*   CALCIUM  --  8.7   PROT  --  6.3   ALBUMIN  --  2.6*   BILITOT  --  0.3   ALKPHOS  --  80   AST  --  17   ALT  --  49*   ANIONGAP  --  10       Significant Imaging: I have reviewed all pertinent imaging results/findings within the past 24 hours.    MRI Abdomen 11/24:  Impression:     Continued branching tubular areas of hypoenhancement in the right posterior liver with adjacent edema, overall slightly improved and remains consistent pylephlebitis.  No abscess.  Recommend continued follow-up.

## 2024-11-26 NOTE — PLAN OF CARE
Recommendations  1. Continue Regular diet   2. RD following     Goals: Meet % of EEN/EPN by RD f/u date  Nutrition Goal Status: goal not met  Communication of RD Recs:POC

## 2024-11-26 NOTE — ASSESSMENT & PLAN NOTE
- discovered on MRI Abdomen, which led to suspicion for intra-abdominal infection  - hepatic function monitored closely and maintained  -  discussed case with GI and endorse no intervention aside from treating infection  - repeat MRI with some improvement  - counseled family on importance of relaying this to follow up PCP in event may want to repeat imaging in future or close monitoring of hepatic function  - treatment as above

## 2024-11-26 NOTE — ASSESSMENT & PLAN NOTE
LOGAN is likely due to pre-renal azotemia due to dehydration and ?contrast . Baseline creatinine is  unknown . Most recent creatinine and eGFR are listed below.  Recent Labs     11/24/24  0852 11/25/24  0210 11/26/24  0220   CREATININE 1.8* 1.7* 1.6*   EGFRNORACEVR 45.6* 48.8* 52.5*        Plan  - renally dose meds for GFR listed above  - Monitor urine output, serial BMP, and adjust therapy as needed; urine sample for FENA indeterminate; Vanc/Zosyn combination adding to mild renal insult but benefits outweigh risks  - improving and likely to improve further once antibiotics are discontinued

## 2024-11-26 NOTE — PLAN OF CARE
Outpatient Antibiotic Therapy Plan:    Please send referral to [facility].    1) Infection: pyelephlebitis    2) Discharge Antibiotics:    Intravenous antibiotics:  Ceftriaxone 2g IV q 24 hours       3) Therapy Duration:  6 weeks    Estimated end date of IV antibiotics: 12/29/24    4) Outpatient Weekly Labs:    Order the following labs to be drawn on Mondays:   CBC  CMP       5) Fax Lab Results to Infectious Diseases Provider: Dr. Mckeon    [facility] Fax Number: [phone]    6) Outpatient Infectious Diseases Follow-up    Follow-up appointment will be arranged by the ID clinic and will be found in the patient's appointments tab.    Prior to discharge, please ensure the patient's follow-up has been scheduled.    If there is still no follow-up scheduled prior to discharge, please send an AGNITiO message to [facility] Clinical Pool or Call Infectious Diseases Dept.

## 2024-11-26 NOTE — SUBJECTIVE & OBJECTIVE
"Past Medical History:   Diagnosis Date    Pylephlebitis 11/26/2024       History reviewed. No pertinent surgical history.    Review of patient's allergies indicates:  No Known Allergies    Medications:  Medications Prior to Admission   Medication Sig    albuterol (PROVENTIL/VENTOLIN HFA) 90 mcg/actuation inhaler albuterol sulfate HFA 90 mcg/actuation aerosol inhaler   INHALE 1 PUFF BY MOUTH EVERY 4 TO 6 HOURS AS NEEDED FOR SHORTNESS OF BREATH    azelastine (ASTELIN) 137 mcg (0.1 %) nasal spray 1 spray (137 mcg total) by Nasal route 2 (two) times daily.    fluticasone propionate (FLONASE) 50 mcg/actuation nasal spray 2 sprays (100 mcg total) by Each Nostril route 2 (two) times daily.    sertraline (ZOLOFT) 100 MG tablet sertraline 100 mg tablet   TAKE 1 AND 1/2 TABLETS BY MOUTH EVERY MORNING     Antibiotics (From admission, onward)      Start     Stop Route Frequency Ordered    11/18/24 0330  piperacillin-tazobactam (ZOSYN) 4.5 g in D5W 100 mL IVPB (MB+)         -- IV Every 8 hours (non-standard times) 11/17/24 2359    11/18/24 0058  vancomycin - pharmacy to dose  (vancomycin IVPB (PEDS and ADULTS))        Placed in "And" Linked Group    -- IV pharmacy to manage frequency 11/17/24 2358          Antifungals (From admission, onward)      None          Antivirals (From admission, onward)      None             Immunization History   Administered Date(s) Administered    COVID-19, MRNA, LN-S, PF (Pfizer) (Purple Cap) 03/20/2021, 04/11/2021       Family History    None       Social History     Socioeconomic History    Marital status: Single   Tobacco Use    Smoking status: Some Days     Types: Cigars    Smokeless tobacco: Former     Types: Chew     Social Drivers of Health     Financial Resource Strain: Medium Risk (11/18/2024)    Overall Financial Resource Strain (CARDIA)     Difficulty of Paying Living Expenses: Somewhat hard   Food Insecurity: Food Insecurity Present (11/18/2024)    Hunger Vital Sign     Worried About " Running Out of Food in the Last Year: Sometimes true     Ran Out of Food in the Last Year: Sometimes true   Transportation Needs: No Transportation Needs (11/18/2024)    TRANSPORTATION NEEDS     Transportation : No   Stress: Stress Concern Present (11/18/2024)    [employer] - Occupational Stress Questionnaire     Feeling of Stress : To some extent   Housing Stability: High Risk (11/18/2024)    Housing Stability Vital Sign     Unable to Pay for Housing in the Last Year: Yes     Homeless in the Last Year: No     Review of Systems   Constitutional:  Negative for chills and fever.   Respiratory:  Negative for cough and shortness of breath.    Cardiovascular:  Negative for chest pain, palpitations and leg swelling.   Gastrointestinal:  Negative for abdominal pain, constipation, diarrhea, nausea and vomiting.   Genitourinary:  Negative for dysuria and hematuria.   Musculoskeletal:  Negative for arthralgias and myalgias.   Skin:  Negative for rash.   Neurological:  Negative for weakness and headaches.     Objective:     Vital Signs (Most Recent):  Temp: 98.7 °F (37.1 °C) (11/26/24 1530)  Pulse: 87 (11/26/24 1530)  Resp: 19 (11/26/24 1530)  BP: 124/81 (11/26/24 1530)  SpO2: (!) 94 % (11/26/24 1530) Vital Signs (24h Range):  Temp:  [97.9 °F (36.6 °C)-99.3 °F (37.4 °C)] 98.7 °F (37.1 °C)  Pulse:  [] 87  Resp:  [18-19] 19  SpO2:  [94 %-98 %] 94 %  BP: (115-150)/(80-93) 124/81     Weight: 109.9 kg (242 lb 4.6 oz)  Body mass index is 34.76 kg/m².    Estimated Creatinine Clearance: 69.4 mL/min (A) (based on SCr of 1.6 mg/dL (H)).     Physical Exam  Vitals reviewed.   Constitutional:       General: He is not in acute distress.     Appearance: Normal appearance. He is not ill-appearing.   HENT:      Head: Normocephalic and atraumatic.   Eyes:      Extraocular Movements: Extraocular movements intact.      Conjunctiva/sclera: Conjunctivae normal.   Cardiovascular:      Rate and Rhythm: Normal rate  and regular rhythm.      Heart sounds: No murmur heard.  Pulmonary:      Effort: Pulmonary effort is normal. No respiratory distress.      Breath sounds: Normal breath sounds.   Abdominal:      General: Abdomen is flat. Bowel sounds are normal.      Palpations: Abdomen is soft.      Tenderness: There is no abdominal tenderness.   Musculoskeletal:      Cervical back: Normal range of motion.   Skin:     General: Skin is warm and dry.   Neurological:      General: No focal deficit present.      Mental Status: He is alert and oriented to person, place, and time.   Psychiatric:         Mood and Affect: Mood normal.         Behavior: Behavior normal.         Thought Content: Thought content normal.          Significant Labs: All pertinent labs within the past 24 hours have been reviewed.  Recent Lab Results         11/26/24  0220        Albumin 2.6       ALP 80       ALT 49       Anion Gap 10       AST 17       Baso # 0.08       Basophil % 1.0       BILIRUBIN TOTAL 0.3  Comment: For infants and newborns, interpretation of results should be based  on gestational age, weight and in agreement with clinical  observations.    Premature Infant recommended reference ranges:  Up to 24 hours.............<8.0 mg/dL  Up to 48 hours............<12.0 mg/dL  3-5 days..................<15.0 mg/dL  6-29 days.................<15.0 mg/dL         BUN 18       Calcium 8.7       Chloride 110       CO2 23       Creatinine 1.6       Differential Method Automated       eGFR 52.5       Eos # 0.2       Eos % 2.6       Glucose 102       Gran # (ANC) 6.5       Gran % 77.5       Hematocrit 37.2       Hemoglobin 12.0       Immature Grans (Abs) 0.03  Comment: Mild elevation in immature granulocytes is non specific and   can be seen in a variety of conditions including stress response,   acute inflammation, trauma and pregnancy. Correlation with other   laboratory and clinical findings is essential.         Immature Granulocytes 0.4       Lactic Acid  Level 0.7  Comment: Falsely low lactic acid results can be found in samples   containing >=13.0 mg/dL total bilirubin and/or >=3.5 mg/dL   direct bilirubin.         Lymph # 1.1       Lymph % 12.6       Magnesium  2.1       MCH 28.6       MCHC 32.3       MCV 89       Mono # 0.5       Mono % 5.9       MPV 9.4       nRBC 0       Phosphorus Level 4.3       Platelet Count 374       Potassium 3.9       PROTEIN TOTAL 6.3       RBC 4.19       RDW 13.1       Sodium 143       WBC 8.32               Significant Imaging: I have reviewed all pertinent imaging results/findings within the past 24 hours.

## 2024-11-26 NOTE — PROGRESS NOTES
VANCOMYCIN DOSING BY PHARMACY DISCONTINUATION NOTE    Gentry Dowling is a 49 y.o. male who had been consulted for vancomycin dosing.    The pharmacy consult for vancomycin dosing has been discontinued.     Vancomycin Dosing by Pharmacy Consult will sign-off. Please reconsult if necessary. Thank you for allowing us to participate in this patient's care.     Charito Solo Pharm.D.  [phone]

## 2024-11-26 NOTE — ASSESSMENT & PLAN NOTE
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 Was febrile to 102 on admission, last fever was 101.6 on 11/19. Has been on vancomycin and zosyn since 11/17. Repeat MRI with slight improvement of pyelephlebitis.     Unclear cause of pyelephlebitis, but there is some literature showing infectious causes (E. Coli, Klebsiella, etc). Favor treating for 6 week course for vascular infection.     Recommendations  Stop vancomycin and zosyn  Start ceftriaxone 2g daily  Duration 6 weeks (11/17 - 12/29)

## 2024-11-26 NOTE — PROGRESS NOTES
"[facility] - Internal Medicine Telemetry  Adult Nutrition  Progress Note    SUMMARY       Recommendations  1. Continue Regular diet   2. RD following    Goals: Meet % of EEN/EPN by RD f/u date  Nutrition Goal Status: goal not met  Communication of RD Recs:POC    Assessment and Plan    No nutrition dx at this time     Reason for Assessment    Reason For Assessment: length of stay  Diagnosis: other (see comments) (sepsis)  General Information Comments: Pt is LOS. Pt reports overall good appetite, ate 100% of breakfast. Pt does report intentional weight loss 2/2 to a study he has been doing. NFPE not warranted as pt is well nourished w/ no s/s of malnutrition at this time. RD following.  Nutrition Discharge Planning: Regular diet  Nutrition Related Social Determinants of Health: SDOH: None Identified      Nutrition Risk Screen    Nutrition Risk Screen: no indicators present    Nutrition/Diet History    Food Allergies: NKFA    Anthropometrics    Temp: 98.7 °F (37.1 °C)  Height Method: Stated  Height: 5' 10" (177.8 cm)  Height (inches): 70 in  Weight Method: Standard Scale  Weight: 109.9 kg (242 lb 4.6 oz)  Weight (lb): 242.29 lb  Ideal Body Weight (IBW), Male: 166 lb  % Ideal Body Weight, Male (lb): 145.96 %  BMI (Calculated): 34.8  BMI Grade: 30 - 34.9- obesity - grade I       Lab/Procedures/Meds    Pertinent Labs Reviewed: reviewed  Pertinent Labs Comments: -  Pertinent Medications Reviewed: reviewed  Pertinent Medications Comments: -    Estimated/Assessed Needs    Weight Used For Calorie Calculations: 109.9 kg (242 lb 4.6 oz)  Energy Calorie Requirements (kcal): 2364  Energy Need Method: Benjamin-St April (PAL 1.2)  Protein Requirements: 110 g (1.0 g/kg)  Weight Used For Protein Calculations: 109.9 kg (242 lb 4.6 oz)   RDA Method (mL): 1970     Nutrition Prescription Ordered    Current Diet Order: Regular diet    Evaluation of Received Nutrient/Fluid Intake    I/O: -  Energy Calories Required: not meeting " needs  Protein Required: not meeting needs  Fluid Required: not meeting needs  Total Fluid Intake (mL/kg): 1 ml or fluid per MD  Tolerance: tolerating  % Intake of Estimated Energy Needs: 75 - 100 %  % Meal Intake: 75 - 100 %    Nutrition Risk    Level of Risk/Frequency of Follow-up: low (1-2x/week)    Monitor and Evaluation    Food and Nutrient Intake: energy intake, food and beverage intake  Food and Nutrient Adminstration: diet order  Knowledge/Beliefs/Attitudes: food and nutrition knowledge/skill, beliefs and attitudes  Physical Activity and Function: nutrition-related ADLs and IADLs, factors affecting access to physical activity  Anthropometric Measurements: height/length, weight, weight change, body mass index, growth pattern indices/percentile ranks  Biochemical Data, Medical Tests and Procedures: electrolyte and renal panel, inflammatory profile, lipid profile, gastrointestinal profile, glucose/endocrine profile  Nutrition-Focused Physical Findings: overall appearance, extremities, muscles and bones, skin, head and eyes     Nutrition Follow-Up    RD Follow-up?: Yes  By Georgie Goldberg, Registration Eligible, PL-LDN

## 2024-11-26 NOTE — PROGRESS NOTES
Pharmacokinetic Assessment Follow Up: IV Vancomycin    Vancomycin serum concentration assessment(s):    Vancomycin random was drawn about 16 hours post dose and is within goal of 10 - 20.     Vancomycin Regimen Plan:    Pulse dose with vancomycin 1250 mg once now.   Draw vancomycin random on 11/26 at 2000.   Renal function has not returned to baseline, so vancomycin will not be scheduled at this time.     Drug levels (last 3 results):  Recent Labs   Lab Result Units 11/24/24  0523 11/24/24 2015 11/25/24  1337   Vancomycin, Random ug/mL 20.0 9.7 15.3       Pharmacy will continue to follow and monitor vancomycin.    Please contact pharmacy at extension [phone] for questions regarding this assessment.    Thank you for the consult,   Charito Solo       Patient brief summary:  Gentry Dowling is a 49 y.o. male initiated on antimicrobial therapy with IV Vancomycin for treatment of sepsis    Drug Allergies:   Review of patient's allergies indicates:  No Known Allergies    Actual Body Weight:   109.9 kg     Renal Function:   Estimated Creatinine Clearance: 65.3 mL/min (A) (based on SCr of 1.7 mg/dL (H)).,     Dialysis Method (if applicable):  N/A    CBC (last 72 hours):  Recent Labs   Lab Result Units 11/23/24  1009 11/24/24  0852   WBC K/uL 8.69 8.05   Hemoglobin g/dL 13.1* 13.1*   Hematocrit % 41.2 38.8*   Platelets K/uL 426 358   Gran % % 81.4* 82.7*   Lymph % % 11.0* 9.9*   Mono % % 4.0 3.9*   Eosinophil % % 2.2 2.1   Basophil % % 0.9 0.9   Differential Method  Automated Automated       Metabolic Panel (last 72 hours):  Recent Labs   Lab Result Units 11/23/24  0459 11/23/24  1009 11/24/24  0523 11/24/24  0852 11/24/24  2139 11/25/24  0210   Sodium mmol/L  --  144  --  144  --   --    Sodium, Urine mmol/L  --   --   --   --  97  --    Potassium mmol/L  --  3.6  --  3.5  --   --    Chloride mmol/L  --  105  --  108  --   --    CO2 mmol/L  --  31*  --  25  --   --    Glucose mg/dL  --  101  --  140*  --   --    BUN mg/dL   --  15  --  17  --   --    Creatinine mg/dL 1.6* 1.6* 1.8* 1.8*  --  1.7*   Creatinine, Urine mg/dL  --   --   --   --  82.0  --    Albumin g/dL  --  2.6*  --  2.6*  --   --    Total Bilirubin mg/dL  --  0.4  --  0.5  --   --    Alkaline Phosphatase U/L  --  97  --  90  --   --    AST U/L  --  31  --  26  --   --    ALT U/L  --  68*  --  65*  --   --        Vancomycin Administrations:  vancomycin given in the last 96 hours                     vancomycin 1,250 mg in 0.9% NaCl 250 mL IVPB (admixture device) (mg) 1,250 mg New Bag 11/25/24 2011    vancomycin 1,500 mg in 0.9% NaCl 250 mL IVPB (admixture device) (mg) 1,500 mg New Bag 11/24/24 2156    vancomycin 1,500 mg in 0.9% NaCl 250 mL IVPB (admixture device) (mg) 1,500 mg New Bag 11/23/24 2051    vancomycin 1,500 mg in 0.9% NaCl 250 mL IVPB (admixture device) (mg) 1,500 mg New Bag 11/22/24 0841                    Microbiologic Results:  Microbiology Results (last 7 days)       Procedure Component Value Units Date/Time    Clostridium difficile EIA [[id number]] Collected: 11/25/24 2014    Order Status: Sent Specimen: Stool Updated: 11/25/24 2014    Stool culture [[id number]] Collected: 11/22/24 1128    Order Status: Completed Specimen: Stool Updated: 11/25/24 1052     Stool Culture No Salmonella,Shigella,Vibrio,Campylobacter,Yersinia isolated.    E. coli 0157 antigen [[id number]] Collected: 11/22/24 1128    Order Status: Completed Specimen: Stool Updated: 11/24/24 2346     Shiga Toxin 1 E.coli Negative     Shiga Toxin 2 E.coli Negative    Blood culture #1 **CANNOT BE ORDERED STAT** [[id number]] Collected: 11/17/24 8259    Order Status: Completed Specimen: Blood from Peripheral, Upper Arm, Right Updated: 11/22/24 2012     Blood Culture, Routine No growth after 5 days.    Blood culture #2 **CANNOT BE ORDERED STAT** [[id number]] Collected: 11/17/24 1749    Order Status: Completed Specimen: Blood from Peripheral, Upper Arm, Right Updated: 11/22/24 2012     Blood Culture,  Routine No growth after 5 days.

## 2024-11-27 VITALS
TEMPERATURE: 98 F | SYSTOLIC BLOOD PRESSURE: 128 MMHG | DIASTOLIC BLOOD PRESSURE: 81 MMHG | HEART RATE: 84 BPM | OXYGEN SATURATION: 96 % | RESPIRATION RATE: 18 BRPM | BODY MASS INDEX: 34.69 KG/M2 | WEIGHT: 242.31 LBS | HEIGHT: 70 IN

## 2024-11-27 PROBLEM — S09.90XA HEAD TRAUMA: Status: RESOLVED | Noted: 2024-11-18 | Resolved: 2024-11-27

## 2024-11-27 PROBLEM — R50.9 FEVER OF UNKNOWN ORIGIN: Status: RESOLVED | Noted: 2024-11-17 | Resolved: 2024-11-27

## 2024-11-27 PROBLEM — A41.9 SEPSIS WITHOUT ACUTE ORGAN DYSFUNCTION: Status: RESOLVED | Noted: 2024-11-17 | Resolved: 2024-11-27

## 2024-11-27 PROBLEM — E87.6 HYPOKALEMIA: Status: RESOLVED | Noted: 2024-11-21 | Resolved: 2024-11-27

## 2024-11-27 PROBLEM — E66.811 OBESITY (BMI 30.0-34.9): Status: ACTIVE | Noted: 2024-11-27

## 2024-11-27 LAB
ALBUMIN SERPL BCP-MCNC: 2.7 G/DL (ref 3.5–5.2)
ALP SERPL-CCNC: 85 U/L (ref 40–150)
ALT SERPL W/O P-5'-P-CCNC: 44 U/L (ref 10–44)
ANION GAP SERPL CALC-SCNC: 10 MMOL/L (ref 8–16)
AST SERPL-CCNC: 16 U/L (ref 10–40)
BASOPHILS # BLD AUTO: 0.08 K/UL (ref 0–0.2)
BASOPHILS NFR BLD: 0.9 % (ref 0–1.9)
BILIRUB SERPL-MCNC: 0.3 MG/DL (ref 0.1–1)
BUN SERPL-MCNC: 18 MG/DL (ref 6–20)
CALCIUM SERPL-MCNC: 8.9 MG/DL (ref 8.7–10.5)
CHLORIDE SERPL-SCNC: 109 MMOL/L (ref 95–110)
CO2 SERPL-SCNC: 24 MMOL/L (ref 23–29)
CREAT SERPL-MCNC: 1.6 MG/DL (ref 0.5–1.4)
DIFFERENTIAL METHOD BLD: ABNORMAL
EOSINOPHIL # BLD AUTO: 0.3 K/UL (ref 0–0.5)
EOSINOPHIL NFR BLD: 3 % (ref 0–8)
ERYTHROCYTE [DISTWIDTH] IN BLOOD BY AUTOMATED COUNT: 13.2 % (ref 11.5–14.5)
EST. GFR  (NO RACE VARIABLE): 52.5 ML/MIN/1.73 M^2
GLUCOSE SERPL-MCNC: 116 MG/DL (ref 70–110)
HCT VFR BLD AUTO: 39.8 % (ref 40–54)
HGB BLD-MCNC: 12.9 G/DL (ref 14–18)
IMM GRANULOCYTES # BLD AUTO: 0.05 K/UL (ref 0–0.04)
IMM GRANULOCYTES NFR BLD AUTO: 0.6 % (ref 0–0.5)
LYMPHOCYTES # BLD AUTO: 1.3 K/UL (ref 1–4.8)
LYMPHOCYTES NFR BLD: 14.3 % (ref 18–48)
MAGNESIUM SERPL-MCNC: 2 MG/DL (ref 1.6–2.6)
MCH RBC QN AUTO: 28.4 PG (ref 27–31)
MCHC RBC AUTO-ENTMCNC: 32.4 G/DL (ref 32–36)
MCV RBC AUTO: 88 FL (ref 82–98)
MONOCYTES # BLD AUTO: 0.4 K/UL (ref 0.3–1)
MONOCYTES NFR BLD: 4.7 % (ref 4–15)
NEUTROPHILS # BLD AUTO: 6.8 K/UL (ref 1.8–7.7)
NEUTROPHILS NFR BLD: 76.5 % (ref 38–73)
NRBC BLD-RTO: 0 /100 WBC
O+P STL MICRO: NORMAL
PHOSPHATE SERPL-MCNC: 4.3 MG/DL (ref 2.7–4.5)
PLATELET # BLD AUTO: 372 K/UL (ref 150–450)
PMV BLD AUTO: 9.1 FL (ref 9.2–12.9)
POTASSIUM SERPL-SCNC: 3.9 MMOL/L (ref 3.5–5.1)
PROT SERPL-MCNC: 6.6 G/DL (ref 6–8.4)
RBC # BLD AUTO: 4.55 M/UL (ref 4.6–6.2)
SODIUM SERPL-SCNC: 143 MMOL/L (ref 136–145)
WBC # BLD AUTO: 8.93 K/UL (ref 3.9–12.7)

## 2024-11-27 PROCEDURE — 85025 COMPLETE CBC W/AUTO DIFF WBC: CPT | Performed by: STUDENT IN AN ORGANIZED HEALTH CARE EDUCATION/TRAINING PROGRAM

## 2024-11-27 PROCEDURE — 36415 COLL VENOUS BLD VENIPUNCTURE: CPT | Performed by: STUDENT IN AN ORGANIZED HEALTH CARE EDUCATION/TRAINING PROGRAM

## 2024-11-27 PROCEDURE — 25000003 PHARM REV CODE 250: Performed by: STUDENT IN AN ORGANIZED HEALTH CARE EDUCATION/TRAINING PROGRAM

## 2024-11-27 PROCEDURE — 83735 ASSAY OF MAGNESIUM: CPT | Performed by: STUDENT IN AN ORGANIZED HEALTH CARE EDUCATION/TRAINING PROGRAM

## 2024-11-27 PROCEDURE — 63600175 PHARM REV CODE 636 W HCPCS: Performed by: STUDENT IN AN ORGANIZED HEALTH CARE EDUCATION/TRAINING PROGRAM

## 2024-11-27 PROCEDURE — 84100 ASSAY OF PHOSPHORUS: CPT | Performed by: STUDENT IN AN ORGANIZED HEALTH CARE EDUCATION/TRAINING PROGRAM

## 2024-11-27 PROCEDURE — 80053 COMPREHEN METABOLIC PANEL: CPT | Performed by: STUDENT IN AN ORGANIZED HEALTH CARE EDUCATION/TRAINING PROGRAM

## 2024-11-27 RX ORDER — MUPIROCIN 20 MG/G
OINTMENT TOPICAL 2 TIMES DAILY
Status: DISCONTINUED | OUTPATIENT
Start: 2024-11-27 | End: 2024-11-27 | Stop reason: HOSPADM

## 2024-11-27 RX ORDER — CEFTRIAXONE 2 G/1
2 INJECTION, POWDER, FOR SOLUTION INTRAMUSCULAR; INTRAVENOUS
Status: DISCONTINUED | OUTPATIENT
Start: 2024-11-27 | End: 2024-11-27 | Stop reason: HOSPADM

## 2024-11-27 RX ORDER — CEFTRIAXONE 2 G/1
2 INJECTION, POWDER, FOR SOLUTION INTRAMUSCULAR; INTRAVENOUS DAILY
Qty: 64 G | Refills: 0 | Status: SHIPPED | OUTPATIENT
Start: 2024-11-27 | End: 2024-12-29

## 2024-11-27 RX ADMIN — GUAIFENESIN AND DEXTROMETHORPHAN HYDROBROMIDE 2 TABLET: 600; 30 TABLET, EXTENDED RELEASE ORAL at 08:11

## 2024-11-27 RX ADMIN — Medication 300 ML: at 11:11

## 2024-11-27 RX ADMIN — Medication 1 CAPSULE: at 08:11

## 2024-11-27 RX ADMIN — Medication 300 ML: at 08:11

## 2024-11-27 RX ADMIN — CEFTRIAXONE SODIUM 2 G: 2 INJECTION, POWDER, FOR SOLUTION INTRAMUSCULAR; INTRAVENOUS at 01:11

## 2024-11-27 RX ADMIN — FLUTICASONE PROPIONATE 100 MCG: 50 SPRAY, METERED NASAL at 08:11

## 2024-11-27 NOTE — NURSING
Patient and mother gathered belongings for discharge. Patient being wheeled out via wheelchair. Medications being delivered to the home.

## 2024-11-27 NOTE — ASSESSMENT & PLAN NOTE
* Sepsis without acute organ dysfunction  Fever of unknown origin (abdominal/GI related)  - febrile with tachycardia and leukocytosis on admit  - lactic WNL  - source unclear- UA, CXR unremarkable  - COVID/flu negative; HIV neg  - CTH negative  - CT chest/abd/pelvis ordered - Posterior right hepatic lobe heterogeneously enhancing/hypoenhancing area. The abrupt diminished enhancement of posterior right portal vein raises suspicion for thrombosis and associated hepatic infarction.  Other underlying etiology including infection/inflammation not excluded.    Symmetric bilateral perinephric fat stranding with unknown chronicity.  Urinalysis can be attempted to rule out infectious process.   Mild hepatosplenomegaly.   - Obtained ultrasound - negative for thrombosis. Heterogeneous hypoechoic lesion near the dome of the right hepatic lobe posteriorly.  A mass is not excluded.  MRI ordered and evident of some edema around liver without abscess and pylephlebitis, which hints at intra-abdominal infection: continue Vancomycin, Zosyn; no anticoagulation required; obtained stool studies to assess for culprit as well as GI pathogen PCR panel; Infectious Diseases consult once samples return but feel confident in Zosyn; monitoring liver function closely and signs/symptoms of infarct in any bowel region; potential consult to Interventional Radiology for potential drainage of region (may be limited by lack of abscess) for sample evaluation;  planned repeat MRI on 11/25 reveals continued but some improvement in pylephlebitis  - GI pathogen panel pending; notable negatives: Hep A, B, C, Rotavirus, Giardia, Shigella, Salmonella, Yersinia, Cryptosporidium, E. Coli 0157  - no photophobia or neck rigidity, had joint injections but roughly 8 years ago, no rash, pulmonary symptoms, murmur, or sinus discomfort; difficult to ascertain cause at this stage but improving overall  - will continue to monitor progress and obtain additional labs as  appropriate  - encourage oral hydration  - after consultation with Infectious Diseases, deemed that a total of 6 weeks IV antibiotics appropriate given vascular involvement in pylephlebitis  - PICC line placed and Rocephin 2g daily until 12/29 at home  - Infectious Diseases to follow up outpatient
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
## 2024-11-27 NOTE — ASSESSMENT & PLAN NOTE
LOGAN is likely due to pre-renal azotemia due to dehydration and ?contrast . Baseline creatinine is  unknown . Most recent creatinine and eGFR are listed below.  Recent Labs     11/25/24  0210 11/26/24  0220 11/27/24  0350   CREATININE 1.7* 1.6* 1.6*   EGFRNORACEVR 48.8* 52.5* 52.5*        Plan  - renally dose meds for GFR listed above  - Monitor urine output, serial BMP, and adjust therapy as needed; urine sample for FENA indeterminate; Vanc/Zosyn combination adding to mild renal insult but benefits outweighed risks  - improving and likely to improve further once Vanc/Zosyn switched to Rocephin: will follow up with PCP for labs

## 2024-11-27 NOTE — DISCHARGE SUMMARY
"[facility] - Internal Medicine [facility] Medicine  Discharge Summary      Patient Name: Gentry Dowling  MRN: [id number]  Admission Date: 11/17/2024  Hospital Length of Stay: 10 days  Discharge Date and Time:  11/27/2024 3:13 PM  Attending Physician: Barrett Reno MD   Discharging Provider: Barrett Reno MD  Discharge Provider Team: [facility] MED D  Primary Care Provider: Kishore Erickson MD        HPI: Gentry Dowling is a 48 y.o. male with a PMHx of obesity who presented to [facility] for evaluation of fever. Patient reported fever with associated fatigue, poor appetite, mailase and feeling generally unwell for the prior week. He reported mild cough, intermittent right lower quadrant "gas pain" for the prior few days. He noticed fever usually occurred in the late afternoon which caused difficulty sleeping. He thought he fell in his sleep and hit his head a few days prior as he woke up with a cut to his head and soreness to the R side of his body. He did not remember the fall and unsure if LOC. Reported intermittent headache which he felt was due to dehydration. Of note, patient reported sinus congestion and pain began about 1 month prior. His mother had similar symptoms about 1 month before which had since resolved. Denied chest pain, LE swelling, rash, vision changes, N/V, diarrhea, foreign travel, other recent sick contacts, history of incarceration, drug use.    * No surgery found *      Hospital Course: ED: Temp 102, , BP 93/63, vitals improved s/p 1L IVFs, tylenol and IV vanc and zosyn. Leukocytosis of WBC 13.6. UA non infectious. POC lactic WNL. CT c/a/p with Posterior right hepatic lobe heterogeneously enhancing/hypoenhancing area.  The abrupt diminished enhancement of posterior right portal vein raised suspicion for thrombosis and associated hepatic infarction.  Other underlying etiology included infection/inflammation not excluded.   Ultrasound negative for portal vein thrombosis. MRI ordered. Pt " developed LOGAN which improved with IVF and oral intake.  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 Given high mortality associated with this condition, close monitoring for bowel ischemia and other complications necessary and discussed with patient.  However, appropriate treatment was undertaken, including treatment with Zosyn.  Given this finding being suggestive of intra-abdominal infection and associated symptoms being present, stool samples obtained but negative. Fever remained of unknown origin but highly suspicious of intra-abdominal infection.  Infectious Diseases consulted and recommend IV antibiotics (Rocephin) until 12/29.    Consults:   Consults (From admission, onward)          Status Ordering Provider     Inpatient consult to Social Work  Once        Provider:  (Not yet assigned)    Acknowledged ADONAY INIGUEZ     Inpatient consult to PICC team (NIAS)  Once        Provider:  (Not yet assigned)    Completed ADONAY INIGUEZ     Inpatient consult to Infectious Diseases  Once        Provider:  (Not yet assigned)    Completed ADONAY INIGUEZ     Inpatient consult to Midline team  Once        Provider:  (Not yet assigned)    Completed ADONAY INIGUEZ     Inpatient consult to PICC team (NIAS)  Once        Provider:  (Not yet assigned)    Completed ADONAY INIGUEZ            Final Active Diagnoses:    Diagnosis Date Noted POA    PRINCIPAL PROBLEM:  Fever of undetermined origin [R50.9] 11/17/2024 Yes    Pylephlebitis [K75.1] 11/26/2024 Yes    LOGAN (acute kidney injury) [N17.9] 11/19/2024 No    Obesity (BMI 30.0-34.9) [E66.811] 11/27/2024 Yes      Problems Resolved During this Admission:    Diagnosis Date Noted Date Resolved POA    Sepsis without acute organ dysfunction [A41.9] 11/17/2024 11/27/2024 Yes    Hypokalemia [E87.6] 11/21/2024 11/27/2024 No    Head trauma [S09.90XA] 11/18/2024 11/27/2024 Yes    Tobacco dependency [F17.200] 11/17/2024 11/18/2024 Yes      Discharged Condition: good    Disposition:  Home or Self Care    Follow Up:   Follow-up Information       Infectious Diseases Follow up.               PCP Follow up.                           Patient Instructions:      Diet Adult Regular     Reason for not Ordering Smoking Cessation Referral     Order Specific Question Answer Comments   Reason for not ordering: Not medically appropriate at this time      Reason for not Prescribing Nicotine Replacement   Order Comments: Non smoker     Order Specific Question Answer Comments   Reason for not Prescribing: Not medically appropriate at this time      Activity as tolerated     Medications:     Medication List        START taking these medications      cefTRIAXone 2 gram injection  Commonly known as: ROCEPHIN  Inject 2 g into the vein once daily.            CONTINUE taking these medications      albuterol 90 mcg/actuation inhaler  Commonly known as: PROVENTIL/VENTOLIN HFA     azelastine 137 mcg (0.1 %) nasal spray  Commonly known as: ASTELIN  1 spray (137 mcg total) by Nasal route 2 (two) times daily.     fluticasone propionate 50 mcg/actuation nasal spray  Commonly known as: FLONASE  2 sprays (100 mcg total) by Each Nostril route 2 (two) times daily.     sertraline 100 MG tablet  Commonly known as: ZOLOFT               Where to Get Your Medications        These medications were sent to [facility] [address], [place] [address]      Hours: Always Open Phone: [phone]   cefTRIAXone 2 gram injection          Significant Diagnostic Studies:     MRI Abdomen 11/24:  Impression:     Continued branching tubular areas of hypoenhancement in the right posterior liver with adjacent edema, overall slightly improved and remains consistent pylephlebitis.  No abscess.  Recommend continued follow-up    Pending Diagnostic Studies:       None          Indwelling Lines/Drains at time of discharge:   Lines/Drains/Airways       Peripherally Inserted Central Catheter Line  Duration             PICC Double  Lumen 11/26/24 1850 left basilic <1 day                    Treatment by problem:    Fever of undetermined origin  * Sepsis without acute organ dysfunction  Fever of unknown origin (abdominal/GI related)  - febrile with tachycardia and leukocytosis on admit  - lactic WNL  - source unclear- UA, CXR unremarkable  - COVID/flu negative; HIV neg  - CTH negative  - CT chest/abd/pelvis ordered - Posterior right hepatic lobe heterogeneously enhancing/hypoenhancing area. The abrupt diminished enhancement of posterior right portal vein raises suspicion for thrombosis and associated hepatic infarction.  Other underlying etiology including infection/inflammation not excluded.    Symmetric bilateral perinephric fat stranding with unknown chronicity.  Urinalysis can be attempted to rule out infectious process.   Mild hepatosplenomegaly.   - Obtained ultrasound - negative for thrombosis. Heterogeneous hypoechoic lesion near the dome of the right hepatic lobe posteriorly.  A mass is not excluded.  MRI ordered and evident of some edema around liver without abscess and pylephlebitis, which hints at intra-abdominal infection: continue Vancomycin, Zosyn; no anticoagulation required; obtained stool studies to assess for culprit as well as GI pathogen PCR panel; Infectious Diseases consult once samples return but feel confident in Zosyn; monitoring liver function closely and signs/symptoms of infarct in any bowel region; potential consult to Interventional Radiology for potential drainage of region (may be limited by lack of abscess) for sample evaluation;  planned repeat MRI on 11/25 reveals continued but some improvement in pylephlebitis  - GI pathogen panel pending; notable negatives: Hep A, B, C, Rotavirus, Giardia, Shigella, Salmonella, Yersinia, Cryptosporidium, E. Coli 0157  - no photophobia or neck rigidity, had joint injections but roughly 8 years ago, no rash, pulmonary symptoms, murmur, or sinus discomfort; difficult to  ascertain cause at this stage but improving overall  - will continue to monitor progress and obtain additional labs as appropriate  - encourage oral hydration  - after consultation with Infectious Diseases, deemed that a total of 6 weeks IV antibiotics appropriate given vascular involvement in pylephlebitis  - PICC line placed and Rocephin 2g daily until 12/29 at home  - Infectious Diseases to follow up outpatient with weekly labs        Pylephlebitis  - discovered on MRI Abdomen, which led to suspicion for intra-abdominal infection  - hepatic function monitored closely and maintained  -  discussed case with GI and endorse no intervention aside from treating infection  - repeat MRI with some improvement  - counseled family on importance of relaying this to follow up PCP in event may want to repeat imaging in future or close monitoring of hepatic function  - treatment as above        LOGAN (acute kidney injury)  LOGAN is likely due to pre-renal azotemia due to dehydration and contrast . Baseline creatinine is  unknown . Most recent creatinine and eGFR are listed below.        Recent Labs     11/25/24  0210 11/26/24  0220 11/27/24  0350   CREATININE 1.7* 1.6* 1.6*   EGFRNORACEVR 48.8* 52.5* 52.5*          Plan  - improving and likely to improve further once Vanc/Zosyn switched to Rocephin: labs to be obtained every Monday while on antibiotic course          Time spent on the discharge of patient: 35 minutes         Barrett Reno MD  Department of Hospital Medicine  [facility] - Internal Medicine Telemetry

## 2024-11-27 NOTE — PROGRESS NOTES
[facility] educator met with Patient and mother discussed discharge plan for home IVABX. Gentry Dowling will dc home with family support. Patient will infuse medication via IVP over 10 minutes. Patient and mother educated on S.A.S.H procedure. S.A.S.H mat provided.  Patient education checklist reviewed and acknowledged by above person(s) above and are agreeable to discharge with home infusion plan of care. IV administration process using aspetic technique was reviewed with successful return demonstration. Patient feels comfortable with infusion. Patient will dc home with Ceftriaxone 2g daily at 6:30 pm IV  for estimated end of therapy date 12/29/24. . Extension placed to double lumen picc. Will come to [facility] for weekly dressing changes and lab draws. Time allotted for questions. Patients nurse and case management team notified teaching has been completed.     Medication delivery will be made to home    Patient will be set up to come to our infusion suite weekly for labs and dressing changes.     [facility]  Kati Houston RN, BSN, Clinical Liaison  Office [phone]  Cell [phone]

## 2024-11-27 NOTE — PLAN OF CARE
Discharge Plan A and Plan B have been determined by review of patient's clinical status, future medical and therapeutic needs, and coverage/benefits for post-acute care in coordination with multidisciplinary team members.    11/27/24 1036   Post-Acute Status   Post-Acute Authorization IV Infusion;Home Health   Home Health Status Pending medical clearance/testing   Coverage MEDICAID - [employer]ET[employer] [facility] -   IV Infusion Status Post acute provider reviewing for acceptance   Discharge Plan   Discharge Plan A Home with family  (Home Infusion)   Discharge Plan B Home Health;Home with family     SW met with patient and Mom to review discharge recommendation of home infusion and is agreeable to plan. Patient also confirmed that he is agreeable to attending infusion suite as needed; no HH required. SW sent home infusion referral to below listed infusion provider; pending post-acute provider review.     [facility]    If an additional preferred facility not listed above is identified, additional referral to be sent. If above facilities unable to accept, will send additional referrals to in-network providers.      12:40pm  SW confirmed w/ [facility] ([facility]) that patient approved. Bedside education complete w/ patient and Mom. Patient to f/u w/ infusion suite for lab draws/dressing change as needed; No HH needed.     SW will continue to follow.                LIZ Tillman, LMSW  [facility]  Case Management  Ext. [phone]

## 2024-11-27 NOTE — SUBJECTIVE & OBJECTIVE
Interval History: PICC line placed for long term antibiotics.  Patient with continued mild diarrhea but denies abdominal pain.  Remains afebrile.    Review of Systems   Constitutional:  Negative for appetite change, chills and fever.   Respiratory:  Negative for cough and shortness of breath.    Cardiovascular:  Negative for chest pain and palpitations.   Gastrointestinal:  Positive for diarrhea. Negative for abdominal pain and nausea.   Genitourinary:  Negative for difficulty urinating and dysuria.   Musculoskeletal:         Chronic,known right rotator cuff tear with pain that is stable   Neurological:  Negative for speech difficulty and headaches.   Psychiatric/Behavioral:  Negative for agitation and behavioral problems.      Objective:     Vital Signs (Most Recent):  Temp: 98.4 °F (36.9 °C) (11/27/24 0828)  Pulse: 87 (11/27/24 0828)  Resp: 18 (11/27/24 0828)  BP: 115/78 (11/27/24 0828)  SpO2: 96 % (11/27/24 0828) Vital Signs (24h Range):  Temp:  [98.1 °F (36.7 °C)-98.7 °F (37.1 °C)] 98.4 °F (36.9 °C)  Pulse:  [82-98] 87  Resp:  [18-19] 18  SpO2:  [94 %-96 %] 96 %  BP: (115-129)/(78-91) 115/78     Weight: 109.9 kg (242 lb 4.6 oz)  Body mass index is 34.76 kg/m².    Intake/Output Summary (Last 24 hours) at 11/27/2024 1110  Last data filed at 11/26/2024 1741  Gross per 24 hour   Intake 480 ml   Output --   Net 480 ml         Physical Exam  Constitutional:       Appearance: He is not toxic-appearing.   HENT:      Head: Normocephalic and atraumatic.   Eyes:      Conjunctiva/sclera: Conjunctivae normal.   Cardiovascular:      Rate and Rhythm: Normal rate and regular rhythm.   Pulmonary:      Effort: Pulmonary effort is normal.      Breath sounds: Normal breath sounds.   Abdominal:      Palpations: Abdomen is soft.   Musculoskeletal:      Right lower leg: No edema.      Left lower leg: No edema.   Neurological:      General: No focal deficit present.      Mental Status: He is alert and oriented to person, place, and  time.   Psychiatric:         Mood and Affect: Mood normal.         Behavior: Behavior normal.             Significant Labs: All pertinent labs within the past 24 hours have been reviewed.  CBC:   Recent Labs   Lab 11/26/24 0220 11/27/24  0350   WBC 8.32 8.93   HGB 12.0* 12.9*   HCT 37.2* 39.8*    372     CMP:   Recent Labs   Lab 11/26/24 0220 11/27/24  0350    143   K 3.9 3.9    109   CO2 23 24    116*   BUN 18 18   CREATININE 1.6* 1.6*   CALCIUM 8.7 8.9   PROT 6.3 6.6   ALBUMIN 2.6* 2.7*   BILITOT 0.3 0.3   ALKPHOS 80 85   AST 17 16   ALT 49* 44   ANIONGAP 10 10     Significant Imaging: I have reviewed all pertinent imaging results/findings within the past 24 hours.     MRI Abdomen 11/24:  Impression:     Continued branching tubular areas of hypoenhancement in the right posterior liver with adjacent edema, overall slightly improved and remains consistent pylephlebitis.  No abscess.  Recommend continued follow-up.

## 2024-11-27 NOTE — CONSULTS
Double lumen PICC to Left Basilic vein.  49 cm in length, 35 cm arm circumference and 3 cm exposed.   Lot # [id number].

## 2024-11-27 NOTE — PROGRESS NOTES
"[facility] - Internal Medicine [facility] Medicine  Progress Note    Patient Name: Gentry Dowling  MRN: [id number]  Patient Class: IP- Inpatient   Admission Date: 11/17/2024  Length of Stay: 10 days  Attending Physician: Barrett Reno MD  Primary Care Provider: Kishore Erickson MD        Subjective:     Principal Problem:Fever of undetermined origin        HPI:  Gentry Dowling is a 48 y.o. male with a PMHx of obesity who presents to [facility] for evaluation of fever. Patient reports fever with associated fatigue, poor appetite, mailase and feeling generally unwell for the past week. He reports mild cough, intermittent right lower quadrant "gas pain" for the past few days. He notices fever usually occurs in the late afternoon which causes difficulty sleeping. He thinks he fell in his sleep and hit his head a few days ago as he woke up with a cut to his head and soreness to the R side of his body. Does not remember the fall and unsure if LOC. Reports intermittent headache which he feels is due to dehydration. Of note, patient reports sinus congestion and pain began about 1 month ago. His mother had similar symptoms about 1 month ago which have since resolved. Denies chest pain, LE swelling, rash, vision changes, N/V, diarrhea. He does mention that he has a family history of leukemia.     Overview/Hospital Course:  ED: Temp 102, , BP 93/63, vitals improved s/p 1L IVFs, tylenol and IV vanc and zosyn. Leukocytosis of WBC 13.6. UA non infectious. POC lactic WNL. CT c/a/p with Posterior right hepatic lobe heterogeneously enhancing/hypoenhancing area.  The abrupt diminished enhancement of posterior right portal vein raises suspicion for thrombosis and associated hepatic infarction.  Other underlying etiology including infection/inflammation not excluded.   Ultrasound negative for portal vein thrombosis. MRI ordered. Pt developed LOGAN which improved with IVF and oral intake.  MRI revealing for pylephlebitis of portal " vessel. Given high mortality associated with this condition, close monitoring for bowel ischemia and other complications necessary and discussed with patient.  However, appropriate treatment ongoing, including treatment with Zosyn.  Given this finding being suggestive of intra-abdominal infection and associated symptoms being present, stool samples obtained but negative. Infectious Diseases consulted and recommend IV antibiotics until 12/29.    Interval History: PICC line placed for long term antibiotics.  Patient with continued mild diarrhea but denies abdominal pain.  Remains afebrile.    Review of Systems   Constitutional:  Negative for appetite change, chills and fever.   Respiratory:  Negative for cough and shortness of breath.    Cardiovascular:  Negative for chest pain and palpitations.   Gastrointestinal:  Positive for diarrhea. Negative for abdominal pain and nausea.   Genitourinary:  Negative for difficulty urinating and dysuria.   Musculoskeletal:         Chronic,known right rotator cuff tear with pain that is stable   Neurological:  Negative for speech difficulty and headaches.   Psychiatric/Behavioral:  Negative for agitation and behavioral problems.      Objective:     Vital Signs (Most Recent):  Temp: 98.4 °F (36.9 °C) (11/27/24 0828)  Pulse: 87 (11/27/24 0828)  Resp: 18 (11/27/24 0828)  BP: 115/78 (11/27/24 0828)  SpO2: 96 % (11/27/24 0828) Vital Signs (24h Range):  Temp:  [98.1 °F (36.7 °C)-98.7 °F (37.1 °C)] 98.4 °F (36.9 °C)  Pulse:  [82-98] 87  Resp:  [18-19] 18  SpO2:  [94 %-96 %] 96 %  BP: (115-129)/(78-91) 115/78     Weight: 109.9 kg (242 lb 4.6 oz)  Body mass index is 34.76 kg/m².    Intake/Output Summary (Last 24 hours) at 11/27/2024 1110  Last data filed at 11/26/2024 1741  Gross per 24 hour   Intake 480 ml   Output --   Net 480 ml         Physical Exam  Constitutional:       Appearance: He is not toxic-appearing.   HENT:      Head: Normocephalic and atraumatic.   Eyes:       Conjunctiva/sclera: Conjunctivae normal.   Cardiovascular:      Rate and Rhythm: Normal rate and regular rhythm.   Pulmonary:      Effort: Pulmonary effort is normal.      Breath sounds: Normal breath sounds.   Abdominal:      Palpations: Abdomen is soft.   Musculoskeletal:      Right lower leg: No edema.      Left lower leg: No edema.   Neurological:      General: No focal deficit present.      Mental Status: He is alert and oriented to person, place, and time.   Psychiatric:         Mood and Affect: Mood normal.         Behavior: Behavior normal.             Significant Labs: All pertinent labs within the past 24 hours have been reviewed.  CBC:   Recent Labs   Lab 11/26/24 0220 11/27/24  0350   WBC 8.32 8.93   HGB 12.0* 12.9*   HCT 37.2* 39.8*    372     CMP:   Recent Labs   Lab 11/26/24 0220 11/27/24  0350    143   K 3.9 3.9    109   CO2 23 24    116*   BUN 18 18   CREATININE 1.6* 1.6*   CALCIUM 8.7 8.9   PROT 6.3 6.6   ALBUMIN 2.6* 2.7*   BILITOT 0.3 0.3   ALKPHOS 80 85   AST 17 16   ALT 49* 44   ANIONGAP 10 10     Significant Imaging: I have reviewed all pertinent imaging results/findings within the past 24 hours.     MRI Abdomen 11/24:  Impression:     Continued branching tubular areas of hypoenhancement in the right posterior liver with adjacent edema, overall slightly improved and remains consistent pylephlebitis.  No abscess.  Recommend continued follow-up.    Assessment/Plan:      * Fever of undetermined origin  * Sepsis without acute organ dysfunction  Fever of unknown origin (abdominal/GI related)  - febrile with tachycardia and leukocytosis on admit  - lactic WNL  - source unclear- UA, CXR unremarkable  - COVID/flu negative; HIV neg  - CTH negative  - CT chest/abd/pelvis ordered - Posterior right hepatic lobe heterogeneously enhancing/hypoenhancing area. The abrupt diminished enhancement of posterior right portal vein raises suspicion for thrombosis and associated hepatic  infarction.  Other underlying etiology including infection/inflammation not excluded.    Symmetric bilateral perinephric fat stranding with unknown chronicity.  Urinalysis can be attempted to rule out infectious process.   Mild hepatosplenomegaly.   - Obtained ultrasound - negative for thrombosis. Heterogeneous hypoechoic lesion near the dome of the right hepatic lobe posteriorly.  A mass is not excluded.  MRI ordered and evident of some edema around liver without abscess and pylephlebitis, which hints at intra-abdominal infection: continue Vancomycin, Zosyn; no anticoagulation required; obtained stool studies to assess for culprit as well as GI pathogen PCR panel; Infectious Diseases consult once samples return but feel confident in Zosyn; monitoring liver function closely and signs/symptoms of infarct in any bowel region; potential consult to Interventional Radiology for potential drainage of region (may be limited by lack of abscess) for sample evaluation;  planned repeat MRI on 11/25 reveals continued but some improvement in pylephlebitis  - GI pathogen panel pending; notable negatives: Hep A, B, C, Rotavirus, Giardia, Shigella, Salmonella, Yersinia, Cryptosporidium, E. Coli 0157  - no photophobia or neck rigidity, had joint injections but roughly 8 years ago, no rash, pulmonary symptoms, murmur, or sinus discomfort; difficult to ascertain cause at this stage but improving overall  - will continue to monitor progress and obtain additional labs as appropriate  - encourage oral hydration  - after consultation with Infectious Diseases, deemed that a total of 6 weeks IV antibiotics appropriate given vascular involvement in pylephlebitis  - PICC line placed and Rocephin 2g daily until 12/29 at home  - Infectious Diseases to follow up outpatient      Pylephlebitis  - discovered on MRI Abdomen, which led to suspicion for intra-abdominal infection  - hepatic function monitored closely and maintained  -  discussed case  with GI and endorse no intervention aside from treating infection  - repeat MRI with some improvement  - counseled family on importance of relaying this to follow up PCP in event may want to repeat imaging in future or close monitoring of hepatic function  - treatment as above      LOGAN (acute kidney injury)  LOGAN is likely due to pre-renal azotemia due to dehydration and ?contrast . Baseline creatinine is  unknown . Most recent creatinine and eGFR are listed below.  Recent Labs     11/25/24  0210 11/26/24  0220 11/27/24  0350   CREATININE 1.7* 1.6* 1.6*   EGFRNORACEVR 48.8* 52.5* 52.5*        Plan  - renally dose meds for GFR listed above  - Monitor urine output, serial BMP, and adjust therapy as needed; urine sample for FENA indeterminate; Vanc/Zosyn combination adding to mild renal insult but benefits outweighed risks  - improving and likely to improve further once Vanc/Zosyn switched to Rocephin: labs to be obtained every Monday while on antibiotic course      VTE Risk Mitigation (From admission, onward)           Ordered     enoxaparin injection 30 mg  Daily         11/23/24 0729     IP VTE HIGH RISK PATIENT  Once         11/17/24 2232                    Discharge Planning   PRESTON: 11/27/2024     Code Status: Full Code   Is the patient medically ready for discharge?: Yes    Reason for patient still in hospital (select all that apply): Discharge today  Discharge Plan A: Home with family (Home Infusion)              Barrett Reno MD  Department of Hospital Medicine   [facility] - Internal Medicine Telemetry

## 2024-11-27 NOTE — PROCEDURES
"Gentry Dowling is a 49 y.o. male patient.    Temp: 98.7 °F (37.1 °C) (11/26/24 1530)  Pulse: 87 (11/26/24 1530)  Resp: 19 (11/26/24 1530)  BP: 124/81 (11/26/24 1530)  SpO2: (!) 94 % (11/26/24 1530)  Weight: 109.9 kg (242 lb 4.6 oz) (11/23/24 0900)  Height: 5' 10" (177.8 cm) (11/26/24 1604)    PICC  Date/Time: 11/26/2024 4:50 PM  Performed by: Ghada Perez RN  Assisting provider: Amor Sandoval RN  Consent Done: Yes  Time out: Immediately prior to procedure a time out was called to verify the correct patient, procedure, equipment, support staff and site/side marked as required  Indications: med administration  Anesthesia: local infiltration  Local anesthetic: lidocaine 1% without epinephrine  Anesthetic Total (mL): 3  Description of findings: PICC  Preparation: skin prepped with ChloraPrep  Skin prep agent dried: skin prep agent completely dried prior to procedure  Sterile barriers: all five maximum sterile barriers used - cap, mask, sterile gown, sterile gloves, and large sterile sheet  Hand hygiene: hand hygiene performed prior to central venous catheter insertion  Location details: left basilic  Catheter type: double lumen  Catheter size: 5 Fr  Catheter Length: 36cm    Ultrasound guidance: yes  Vessel Caliber: medium and patent, compressibility normal  Vascular Doppler: not done  Needle advanced into vessel with real time Ultrasound guidance.  Guidewire confirmed in vessel.  Image recorded and saved.  Sterile sheath used.  no esophageal manometryNumber of attempts: 1  Post-procedure: blood return through all ports, sterile dressing applied and chlorhexidine patch  Technical procedures used: 3CG  Specimens: No  Implants: No  Assessment: placement verified by x-ray  Complications: none  Comments: CATHETER CUT AT 49CM. 3CM EXTERNAL LENGTH.        Name TORSTEN SANDOVAL RN  11/26/2024    "

## 2024-11-28 NOTE — PLAN OF CARE
[facility] - Internal Medicine Telemetry  Discharge Final Note    Primary Care Provider: Kishore Erickson MD    Expected Discharge Date: 11/27/2024    Final Discharge Note (most recent)       Final Note - 11/28/24 1455          Final Note    Assessment Type Final Discharge Note (P)      Anticipated Discharge Disposition IV Therapy Provider (P)      What phone number can be called within the next 1-3 days to see how you are doing after discharge? [phone] (P)         Post-Acute Status    Post-Acute Authorization IV Infusion (P)      Coverage MEDICAID - [employer] Psychiatric - (P)      IV Infusion Status Set-up Complete/Auth obtained (P)                      Future Appointments   Date Time Provider Department Center   12/2/2024  2:00 PM CHAIR 04, [facility] [place]   12/5/2024  1:00 PM Birdie Arenas MD [facility]   12/26/2024 11:30 AM Luz Mckeon,  [facility] ID [facility]      Important Message from Medicare             Contact Info       Infectious Diseases        Next Steps: Follow up    PCP        Next Steps: Follow up          Patient and Mom received IV infusion education at the bedside via [facility].  Patient discharged w/ [facility] services.  Patient discharged home w/ family via personal vehicle.              LIZ Tillman, LMSW  [facility]  Case Management  Ext. [phone]

## 2024-12-05 ENCOUNTER — OFFICE VISIT (OUTPATIENT)
Dept: FAMILY MEDICINE | Facility: HOSPITAL | Age: 49
End: 2024-12-05
Payer: MEDICAID

## 2024-12-05 VITALS
BODY MASS INDEX: 35.79 KG/M2 | HEIGHT: 70 IN | DIASTOLIC BLOOD PRESSURE: 92 MMHG | HEART RATE: 88 BPM | OXYGEN SATURATION: 96 % | WEIGHT: 250 LBS | SYSTOLIC BLOOD PRESSURE: 138 MMHG

## 2024-12-05 DIAGNOSIS — K75.1: Primary | ICD-10-CM

## 2024-12-05 DIAGNOSIS — R03.0 ELEVATED BLOOD PRESSURE READING: ICD-10-CM

## 2024-12-05 DIAGNOSIS — J34.3 NASAL TURBINATE HYPERTROPHY: ICD-10-CM

## 2024-12-05 DIAGNOSIS — R19.7 DIARRHEA, UNSPECIFIED TYPE: ICD-10-CM

## 2024-12-05 DIAGNOSIS — Z13.220 ENCOUNTER FOR SCREENING FOR LIPID DISORDER: ICD-10-CM

## 2024-12-05 DIAGNOSIS — Z09 HOSPITAL DISCHARGE FOLLOW-UP: ICD-10-CM

## 2024-12-05 PROCEDURE — 99214 OFFICE O/P EST MOD 30 MIN: CPT

## 2024-12-05 RX ORDER — LOPERAMIDE HYDROCHLORIDE 2 MG/1
2 CAPSULE ORAL 4 TIMES DAILY PRN
Qty: 30 CAPSULE | Refills: 3 | Status: SHIPPED | OUTPATIENT
Start: 2024-12-05 | End: 2025-01-04

## 2024-12-05 NOTE — PROGRESS NOTES
"Clinic Note  [facility]    Subjective:     Gentry Dowling is a 49 y.o. year old who presents to clinic today for hospital follow up.    PMHx: Back pain (Chronic), Depression, Nasal turbinate hypertrophy, Obesity    Depression:  - No active thoughts of harm either to self or others  - Previous therapies: Sertraline ("removed feelings")    ROXANNE  - Sleep study 2019 with AHI 6.  - Deviated septum & turbinate hypertrophy surgery in 2000  - Reports issues with snoring, ... Likely compounded by sinus issues.    Pylephlebitis of Portal Vessel:  - Hospitalized 11/17/2024 for above, intra-abdominal infection suspected, but cause not isolated. Blood cx NGTD, E Coli antigen negative, Stool cx without isolates. Discharged with IV Rocephin (via PICC line) to end 12/29/24. Plan for outpatient follow-up with ID - appointment scheduled for 12/26.    Review of Systems negative except for symptoms mentioned in HPI    Health Maintenance         Date Due Completion Date    Lipid Panel Never done ---    Pneumococcal Vaccines (Age 0-64) (1 of 2 - PCV) Never done ---    TETANUS VACCINE 09/16/2015 9/16/2005    Colorectal Cancer Screening Never done ---    Influenza Vaccine (1) 09/01/2024 11/2/2020    COVID-19 Vaccine (4 - 2024-25 season) 09/01/2024 12/29/2021    Hemoglobin A1c (Diabetic Prevention Screening) 11/18/2027 11/18/2024    RSV Vaccine (Age 60+ and Pregnant patients) (1 - 1-dose 75+ series) 11/24/2050 ---            Past Medical History:   Diagnosis Date    Obesity (BMI 30.0-34.9) 11/27/2024    Pylephlebitis 11/26/2024       No past surgical history on file.    No family history on file.    Social History     Socioeconomic History    Marital status: Single   Tobacco Use    Smoking status: Some Days     Types: Cigars    Smokeless tobacco: Former     Types: Chew     Social Drivers of Health     Financial Resource Strain: High Risk (12/3/2024)    Overall Financial Resource Strain (CARDIA)     Difficulty of Paying Living " Expenses: Hard   Food Insecurity: Food Insecurity Present (12/3/2024)    Hunger Vital Sign     Worried About Running Out of Food in the Last Year: Often true     Ran Out of Food in the Last Year: Sometimes true   Transportation Needs: No Transportation Needs (11/18/2024)    TRANSPORTATION NEEDS     Transportation : No   Physical Activity: Insufficiently Active (12/3/2024)    Exercise Vital Sign     Days of Exercise per Week: 3 days     Minutes of Exercise per Session: 30 min   Stress: Stress Concern Present (12/3/2024)    [employer] - Occupational Stress Questionnaire     Feeling of Stress : Rather much   Housing Stability: High Risk (12/3/2024)    Housing Stability Vital Sign     Unable to Pay for Housing in the Last Year: Yes     Homeless in the Last Year: No       Current Outpatient Medications   Medication Sig Dispense Refill    azelastine (ASTELIN) 137 mcg (0.1 %) nasal spray 1 spray (137 mcg total) by Nasal route 2 (two) times daily. 30 mL 3    cefTRIAXone (ROCEPHIN) 2 gram injection Inject 2 g into the vein once daily. 64 g 0    fluticasone propionate (FLONASE) 50 mcg/actuation nasal spray 2 sprays (100 mcg total) by Each Nostril route 2 (two) times daily. 18.2 mL 3    loperamide (IMODIUM) 2 mg capsule Take 1 capsule (2 mg total) by mouth 4 (four) times daily as needed for Diarrhea. 30 capsule 3     No current facility-administered medications for this visit.       Review of patient's allergies indicates:  No Known Allergies      Objective:     Vitals:    12/05/24 1320   BP: (!) 138/92   Pulse: 88       Wt Readings from Last 1 Encounters:   12/05/24 113.4 kg (250 lb)       Physical Exam  Constitutional:       General: He is not in acute distress.  Eyes:      General:         Right eye: No discharge.         Left eye: No discharge.      Conjunctiva/sclera: Conjunctivae normal.   Cardiovascular:      Rate and Rhythm: Normal rate and regular rhythm.   Pulmonary:      Effort:  "Pulmonary effort is normal. No respiratory distress.   Skin:     General: Skin is warm.      Coloration: Skin is not jaundiced.      Comments: Mild erythema next to port site. Non-tender, no evidence of abscess. No asymmetry noted between arms   Neurological:      Mental Status: He is alert and oriented to person, place, and time.      Gait: Gait normal.   Psychiatric:         Mood and Affect: Mood normal.         Behavior: Behavior normal.         Assessment/Plan:     Gentry Hunter" was seen today for hospital follow up.    Diagnoses and all orders for this visit:    Pylephlebitis  Hospital discharge follow-up  Acute condition. Stable. Hospital notes reviewed. Labs reviewed - reassuring. Encouraged keeping upcoming appointment with ID, attending all infusion sessions. Discussed return & ER precautions in regards to pylephlebitis & skin erythema.    Diarrhea, unspecified type  Acute condition. Likely secondary to chronic antibiotic use. Reviewed recent stool studies - all negative. Safe to start Imodium. Discussed proper use of medication.  -     loperamide (IMODIUM) 2 mg capsule; Take 1 capsule (2 mg total) by mouth 4 (four) times daily as needed for Diarrhea.    Elevated blood pressure reading  No prior history of HTN. Multiple recent pressure readings - all WNL. Likely reactive. Will follow up at next visit & treat if appropriate.    Nasal turbinate hypertrophy  Chronic condition. Lost to follow up. Would benefit from re-establishing care in setting of persistent symptoms.  -     Ambulatory referral/consult to ENT; Future    Encounter for screening for lipid disorder  -     Lipid Panel; Future        Follow up in about 2 weeks for further .    Case discussed with staff: Dr. Eduardo Arenas MD PGY-2  [facility]     "

## 2024-12-10 ENCOUNTER — TELEPHONE (OUTPATIENT)
Dept: INFECTIOUS DISEASES | Facility: CLINIC | Age: 49
End: 2024-12-10
Payer: MEDICAID

## 2024-12-10 DIAGNOSIS — K75.1: Primary | ICD-10-CM

## 2024-12-10 NOTE — TELEPHONE ENCOUNTER
Called [facility] and gave verbal orders to have them draw a cmp on Wednesday  or Thursday    ----- Message from Luz Mckeon sent at 12/10/2024 11:57 AM CST -----  I ordered another CMP for him, can we get it scheduled for Wednesday or Thursday this week, thanks! He does go to infusion center every week for his PICC dressing change and that's where they usually do his weekly labs, not sure if he can get it done there?  ----- Message -----  From: [facility]  Sent: 12/9/2024   4:10 PM CST  To: Luz Mckeon, DO

## 2024-12-10 NOTE — TELEPHONE ENCOUNTER
Called patient to discuss labwork from yesterday showing glucose of 41. Patient states he did eat before those labs were done, and in the last few days has been feeling very run-down and tired, and today has been pretty drowsy. Denies any abdominal pain, fevers, chills. Is having some sinus congestion. Of note, he does not have any history of diabetes and does not take insulin. He agrees to get labwork rechecked. Advised him to go to the ER if his symptoms get worse or he develops fevers / severe pain. He expressed understanding. All questions answered.     Luz Mckeon  Infectious Disease Fellow PGY5  12/10/2024

## 2024-12-13 ENCOUNTER — TELEPHONE (OUTPATIENT)
Dept: OTOLARYNGOLOGY | Facility: CLINIC | Age: 49
End: 2024-12-13
Payer: MEDICAID

## 2024-12-13 ENCOUNTER — TELEPHONE (OUTPATIENT)
Dept: INFECTIOUS DISEASES | Facility: CLINIC | Age: 49
End: 2024-12-13
Payer: MEDICAID

## 2024-12-20 ENCOUNTER — OFFICE VISIT (OUTPATIENT)
Dept: FAMILY MEDICINE | Facility: HOSPITAL | Age: 49
End: 2024-12-20
Payer: MEDICAID

## 2024-12-20 VITALS
OXYGEN SATURATION: 95 % | HEART RATE: 108 BPM | BODY MASS INDEX: 36.7 KG/M2 | HEIGHT: 70 IN | DIASTOLIC BLOOD PRESSURE: 87 MMHG | SYSTOLIC BLOOD PRESSURE: 140 MMHG | WEIGHT: 256.38 LBS

## 2024-12-20 DIAGNOSIS — Z12.11 ENCOUNTER FOR SCREENING FOR COLORECTAL MALIGNANT NEOPLASM: ICD-10-CM

## 2024-12-20 DIAGNOSIS — G47.33 OBSTRUCTIVE SLEEP APNEA SYNDROME: ICD-10-CM

## 2024-12-20 DIAGNOSIS — Z12.12 ENCOUNTER FOR SCREENING FOR COLORECTAL MALIGNANT NEOPLASM: ICD-10-CM

## 2024-12-20 DIAGNOSIS — K75.1: Primary | ICD-10-CM

## 2024-12-20 DIAGNOSIS — F32.A DEPRESSION, UNSPECIFIED DEPRESSION TYPE: ICD-10-CM

## 2024-12-20 PROCEDURE — 99214 OFFICE O/P EST MOD 30 MIN: CPT

## 2024-12-20 RX ORDER — SODIUM CHLORIDE 0.9 % (FLUSH) 0.9 %
SYRINGE (ML) INJECTION
COMMUNITY
Start: 2024-12-17

## 2024-12-20 RX ORDER — HEPARIN SODIUM,PORCINE/PF 10 UNIT/ML
SYRINGE (ML) INTRAVENOUS
COMMUNITY
Start: 2024-12-17

## 2024-12-20 NOTE — PROGRESS NOTES
"Clinic Note  [facility]    Subjective:     Gentry Dowling is a 49 y.o. year old who presents to clinic today for follow up.    PMHx: Back pain (Chronic), Depression, Nasal turbinate hypertrophy, Obesity     Depression:  - No active thoughts of harm either to self or others  - 12/20/24: PHQ9 - 10 (but mostly sleep related questions), GAD7 - 3  - Believes he is doing well. Does not want to explore medications/therapy at this time.  - Previous therapies: Sertraline ("removed feelings")     ROXANNE:  - Sleep study 2019 with AHI 6.  - Deviated septum & turbinate hypertrophy surgery in 2000  - Reports continued issues with daytime sleepiness, snoring, feeling like airway closing, nocturia.  - Upcoming Otolaryngology appointment 1/6/24     Pylephlebitis of Portal Vessel:  - Hospitalized 11/17/2024 for above, intra-abdominal infection suspected, but cause not isolated. Blood cx NGTD, E Coli antigen negative, Stool cx without isolates. Discharged with IV Rocephin (via PICC line) to end 12/29/24. Plan for outpatient follow-up with ID - appointment scheduled for 12/26.  - Diarrhea resolved. No longer using Imodium.    Review of Systems negative except for symptoms mentioned in HPI    Health Maintenance         Date Due Completion Date    TETANUS VACCINE 09/16/2015 9/16/2005    Colorectal Cancer Screening Never done ---    Influenza Vaccine (1) 09/01/2024 11/2/2020    COVID-19 Vaccine (4 - 2024-25 season) 09/01/2024 12/29/2021    Hemoglobin A1c (Diabetic Prevention Screening) 11/18/2027 11/18/2024    Lipid Panel 12/11/2029 12/11/2024    RSV Vaccine (Age 60+ and Pregnant patients) (1 - 1-dose 75+ series) 11/24/2050 ---            Past Medical History:   Diagnosis Date    Obesity (BMI 30.0-34.9) 11/27/2024    Pylephlebitis 11/26/2024       No past surgical history on file.    No family history on file.    Social History     Socioeconomic History    Marital status: Single   Tobacco Use    Smoking status: Former     Types: " Cigars     Quit date: 10/20/2024     Years since quittin.1    Smokeless tobacco: Former     Types: Chew     Social Drivers of Health     Financial Resource Strain: High Risk (12/3/2024)    Overall Financial Resource Strain (CARDIA)     Difficulty of Paying Living Expenses: Hard   Food Insecurity: Food Insecurity Present (12/3/2024)    Hunger Vital Sign     Worried About Running Out of Food in the Last Year: Often true     Ran Out of Food in the Last Year: Sometimes true   Transportation Needs: No Transportation Needs (2024)    TRANSPORTATION NEEDS     Transportation : No   Physical Activity: Insufficiently Active (12/3/2024)    Exercise Vital Sign     Days of Exercise per Week: 3 days     Minutes of Exercise per Session: 30 min   Stress: Stress Concern Present (12/3/2024)    [employer] - Occupational Stress Questionnaire     Feeling of Stress : Rather much   Housing Stability: High Risk (12/3/2024)    Housing Stability Vital Sign     Unable to Pay for Housing in the Last Year: Yes     Homeless in the Last Year: No       Current Outpatient Medications   Medication Sig Dispense Refill    cefTRIAXone (ROCEPHIN) 2 gram injection Inject 2 g into the vein once daily. 64 g 0    fluticasone propionate (FLONASE) 50 mcg/actuation nasal spray 2 sprays (100 mcg total) by Each Nostril route 2 (two) times daily. 18.2 mL 3    heparin, porcine, PF, 10 unit/mL Syrg       loperamide (IMODIUM) 2 mg capsule Take 1 capsule (2 mg total) by mouth 4 (four) times daily as needed for Diarrhea. (Patient not taking: Reported on 2024) 30 capsule 3    NORMAL SALINE FLUSH injection       azelastine (ASTELIN) 137 mcg (0.1 %) nasal spray 1 spray (137 mcg total) by Nasal route 2 (two) times daily. (Patient not taking: Reported on 2024) 30 mL 3     No current facility-administered medications for this visit.       Review of patient's allergies indicates:  No Known Allergies      Objective:  "    Vitals:    12/20/24 0905   BP: (!) 140/87   Pulse: 108       Wt Readings from Last 1 Encounters:   12/26/24 116 kg (255 lb 11.7 oz)       Physical Exam  Constitutional:       General: He is not in acute distress.  Eyes:      General:         Right eye: No discharge.         Left eye: No discharge.      Conjunctiva/sclera: Conjunctivae normal.   Cardiovascular:      Rate and Rhythm: Normal rate.   Pulmonary:      Effort: Pulmonary effort is normal. No respiratory distress.   Skin:     Comments: Port site no longer red   Neurological:      Mental Status: He is alert and oriented to person, place, and time.      Gait: Gait normal.   Psychiatric:         Mood and Affect: Mood normal.         Behavior: Behavior normal.         Assessment/Plan:     Gentry Hunter" was seen today for follow-up.    Diagnoses and all orders for this visit:    Pylephlebitis  Chronic condition. Weekly abs reviewed. Arm no longer red. No areas for concern. Continue current regimen. Encouraged follow up with ID.    Depression, unspecified depression type  Well controlled. Depression screen done; while scores are elevated, likely secondary to underlying sleep/respiratory difficulties. No areas for acute concern identified.    Encounter for screening for colorectal malignant neoplasm  -     Ambulatory referral/consult to Endo Procedure ; Future    Obstructive sleep apnea syndrome  Chronic condition. Source likely anatomical (congestion & prior history of enlarged turbinates) but patient might benefit from repeat sleep study as well. Referral placed.  -     Ambulatory referral/consult to Sleep Disorders; Future      Case discussed with staff: Dr. Jessica Arenas MD PGY-2  [facility]     "

## 2024-12-26 ENCOUNTER — OFFICE VISIT (OUTPATIENT)
Dept: INFECTIOUS DISEASES | Facility: CLINIC | Age: 49
End: 2024-12-26
Payer: MEDICAID

## 2024-12-26 ENCOUNTER — INFUSION (OUTPATIENT)
Dept: INFECTIOUS DISEASES | Facility: HOSPITAL | Age: 49
End: 2024-12-26
Payer: MEDICAID

## 2024-12-26 VITALS
TEMPERATURE: 99 F | SYSTOLIC BLOOD PRESSURE: 144 MMHG | HEART RATE: 77 BPM | WEIGHT: 257.25 LBS | HEIGHT: 70 IN | BODY MASS INDEX: 36.83 KG/M2 | DIASTOLIC BLOOD PRESSURE: 96 MMHG

## 2024-12-26 VITALS
WEIGHT: 255.75 LBS | TEMPERATURE: 98 F | HEART RATE: 95 BPM | HEIGHT: 70 IN | RESPIRATION RATE: 19 BRPM | OXYGEN SATURATION: 95 % | SYSTOLIC BLOOD PRESSURE: 134 MMHG | BODY MASS INDEX: 36.61 KG/M2 | DIASTOLIC BLOOD PRESSURE: 74 MMHG

## 2024-12-26 DIAGNOSIS — K75.1: Primary | ICD-10-CM

## 2024-12-26 PROCEDURE — 3044F HG A1C LEVEL LT 7.0%: CPT | Mod: CPTII,,, | Performed by: STUDENT IN AN ORGANIZED HEALTH CARE EDUCATION/TRAINING PROGRAM

## 2024-12-26 PROCEDURE — 1111F DSCHRG MED/CURRENT MED MERGE: CPT | Mod: CPTII,,, | Performed by: STUDENT IN AN ORGANIZED HEALTH CARE EDUCATION/TRAINING PROGRAM

## 2024-12-26 PROCEDURE — 99213 OFFICE O/P EST LOW 20 MIN: CPT | Mod: S$PBB,,, | Performed by: STUDENT IN AN ORGANIZED HEALTH CARE EDUCATION/TRAINING PROGRAM

## 2024-12-26 PROCEDURE — 99999 PR PBB SHADOW E&M-EST. PATIENT-LVL III: CPT | Mod: PBBFAC,,, | Performed by: STUDENT IN AN ORGANIZED HEALTH CARE EDUCATION/TRAINING PROGRAM

## 2024-12-26 PROCEDURE — 3080F DIAST BP >= 90 MM HG: CPT | Mod: CPTII,,, | Performed by: STUDENT IN AN ORGANIZED HEALTH CARE EDUCATION/TRAINING PROGRAM

## 2024-12-26 PROCEDURE — 99213 OFFICE O/P EST LOW 20 MIN: CPT | Mod: PBBFAC | Performed by: STUDENT IN AN ORGANIZED HEALTH CARE EDUCATION/TRAINING PROGRAM

## 2024-12-26 PROCEDURE — G2211 COMPLEX E/M VISIT ADD ON: HCPCS | Mod: S$PBB,,, | Performed by: STUDENT IN AN ORGANIZED HEALTH CARE EDUCATION/TRAINING PROGRAM

## 2024-12-26 PROCEDURE — 3008F BODY MASS INDEX DOCD: CPT | Mod: CPTII,,, | Performed by: STUDENT IN AN ORGANIZED HEALTH CARE EDUCATION/TRAINING PROGRAM

## 2024-12-26 PROCEDURE — 3077F SYST BP >= 140 MM HG: CPT | Mod: CPTII,,, | Performed by: STUDENT IN AN ORGANIZED HEALTH CARE EDUCATION/TRAINING PROGRAM

## 2024-12-26 PROCEDURE — 1159F MED LIST DOCD IN RCRD: CPT | Mod: CPTII,,, | Performed by: STUDENT IN AN ORGANIZED HEALTH CARE EDUCATION/TRAINING PROGRAM

## 2024-12-26 NOTE — PROGRESS NOTES
Patient arrives ambulatory for PICC removal as ordered - PICC removed without difficulty from left upper medial aspect of arm - measurement marked at 49 cm with entire catheter intact without compromise noted - sterile vaseline gauze placed and large co-flex wrap with instructions to leave dry and in place x 24 hours . No signs of infection noted to site - no swelling or drainage - Will monitor on unit for 30 minutes as per protocol.    Limited head-to-toe assessment due to privacy issues and visit reason though the opportunity was given for patient to express any concerns.

## 2024-12-26 NOTE — PROGRESS NOTES
INFECTIOUS DISEASE CLINIC  2024     Subjective:      Chief Complaint: pyelephlebitis    History of Present Illness:    Mr. Dowling is a 49M recently hospitalized and found to have R portal vein pyelephlebitis, presents for end of care.     There was an unclear cause for pyelephlebitis, some literature discussed infectious causes such as E coli and Klebsiella, therefore he was discharged with 6 week course of ceftriaxone to end on .     Today he states that he still has fatigue, went to sleep at 730pm yesterday. Does snore at night, wakes up a lot at night, sometimes gasping for air. Does have sleep study in , and ENT appt in January. Denies fevers, chills, abdominal pain, diarrhea, N/V, rash. No issues with PICC line.     Review of Systems   Constitutional: Positive for malaise/fatigue. Negative for chills and fever.   Cardiovascular:  Negative for chest pain.   Respiratory:  Positive for sleep disturbances due to breathing and snoring. Negative for shortness of breath.    Skin:  Negative for rash.   Musculoskeletal:  Negative for joint pain.   Gastrointestinal:  Negative for abdominal pain, diarrhea, nausea and vomiting.         Past Medical History:   Diagnosis Date    Obesity (BMI 30.0-34.9) 2024    Pylephlebitis 2024     No past surgical history on file.  No family history on file.  Social History     Tobacco Use    Smoking status: Former     Types: Cigars     Quit date: 10/20/2024     Years since quittin.1    Smokeless tobacco: Former     Types: Chew       Review of patient's allergies indicates:  No Known Allergies      Objective:   VS (24h):   Vitals:    24 1118   BP: (!) 144/96   Pulse: 77   Temp: 98.8 °F (37.1 °C)         Physical Exam  Vitals reviewed.   Constitutional:       General: He is not in acute distress.     Appearance: Normal appearance. He is not ill-appearing.   HENT:      Head: Normocephalic and atraumatic.   Eyes:      Extraocular Movements: Extraocular  movements intact.      Conjunctiva/sclera: Conjunctivae normal.   Cardiovascular:      Rate and Rhythm: Normal rate and regular rhythm.      Heart sounds: No murmur heard.  Pulmonary:      Effort: Pulmonary effort is normal. No respiratory distress.      Breath sounds: Normal breath sounds.   Abdominal:      General: Abdomen is flat. Bowel sounds are normal.      Palpations: Abdomen is soft.      Tenderness: There is no abdominal tenderness.   Musculoskeletal:      Cervical back: Normal range of motion.   Skin:     General: Skin is warm and dry.   Neurological:      General: No focal deficit present.      Mental Status: He is alert and oriented to person, place, and time.   Psychiatric:         Mood and Affect: Mood normal.         Behavior: Behavior normal.         Thought Content: Thought content normal.               Immunization History   Administered Date(s) Administered    COVID-19, MRNA, LN-S, PF (Pfizer) (Purple Cap) 03/20/2021, 04/11/2021         Assessment & Plan:     1. Pylephlebitis  - recent hospitalization for fevers, found to have R portal vein thrombus / pyelephlebitis  - discharged with ceftriaxone x 6 weeks  - doing well today except for continued daytime fatigue, also snoring/frequently wakes up at night    Plan  - complete abx today and remove PICC line  - repeat MRI abdomen  - hematology referral for hypercoagulable workup    Orders   - PICC line removal; Future  - MRI Abdomen W WO Contrast; Future  - Ambulatory referral/consult to Hematology / Oncology; Future         Follow up PRN.         This patient's visit complexity is inherent to evaluation and management associated with medical care services that are part of ongoing care related to this patient's single, serious condition or complex condition.       Luz Mckeon DO  Infectious Disease Fellow, PGY-5

## 2024-12-31 ENCOUNTER — HOSPITAL ENCOUNTER (OUTPATIENT)
Dept: RADIOLOGY | Facility: HOSPITAL | Age: 49
Discharge: HOME | End: 2024-12-31
Attending: STUDENT IN AN ORGANIZED HEALTH CARE EDUCATION/TRAINING PROGRAM
Payer: MEDICAID

## 2024-12-31 DIAGNOSIS — K75.1: ICD-10-CM

## 2024-12-31 PROCEDURE — 25500020 PHARM REV CODE 255: Performed by: STUDENT IN AN ORGANIZED HEALTH CARE EDUCATION/TRAINING PROGRAM

## 2024-12-31 PROCEDURE — 74183 MRI ABD W/O CNTR FLWD CNTR: CPT | Mod: TC

## 2024-12-31 PROCEDURE — 74183 MRI ABD W/O CNTR FLWD CNTR: CPT | Mod: 26,,, | Performed by: RADIOLOGY

## 2024-12-31 PROCEDURE — A9585 GADOBUTROL INJECTION: HCPCS | Performed by: STUDENT IN AN ORGANIZED HEALTH CARE EDUCATION/TRAINING PROGRAM

## 2024-12-31 RX ORDER — GADOBUTROL 604.72 MG/ML
10 INJECTION INTRAVENOUS
Status: COMPLETED | OUTPATIENT
Start: 2024-12-31 | End: 2024-12-31

## 2024-12-31 RX ADMIN — GADOBUTROL 10 ML: 604.72 INJECTION INTRAVENOUS at 02:12

## 2025-01-03 ENCOUNTER — PATIENT MESSAGE (OUTPATIENT)
Dept: INFECTIOUS DISEASES | Facility: CLINIC | Age: 50
End: 2025-01-03
Payer: MEDICAID

## 2025-01-06 ENCOUNTER — OFFICE VISIT (OUTPATIENT)
Dept: OTOLARYNGOLOGY | Facility: CLINIC | Age: 50
End: 2025-01-06
Payer: MEDICAID

## 2025-01-06 VITALS
HEIGHT: 70 IN | HEART RATE: 89 BPM | DIASTOLIC BLOOD PRESSURE: 74 MMHG | SYSTOLIC BLOOD PRESSURE: 139 MMHG | BODY MASS INDEX: 36.61 KG/M2 | WEIGHT: 255.75 LBS

## 2025-01-06 DIAGNOSIS — J34.3 NASAL TURBINATE HYPERTROPHY: ICD-10-CM

## 2025-01-06 DIAGNOSIS — G47.33 OBSTRUCTIVE SLEEP APNEA: Primary | ICD-10-CM

## 2025-01-06 PROCEDURE — 31231 NASAL ENDOSCOPY DX: CPT | Mod: S$GLB,,, | Performed by: STUDENT IN AN ORGANIZED HEALTH CARE EDUCATION/TRAINING PROGRAM

## 2025-01-06 PROCEDURE — 3078F DIAST BP <80 MM HG: CPT | Mod: CPTII,S$GLB,, | Performed by: STUDENT IN AN ORGANIZED HEALTH CARE EDUCATION/TRAINING PROGRAM

## 2025-01-06 PROCEDURE — 3008F BODY MASS INDEX DOCD: CPT | Mod: CPTII,S$GLB,, | Performed by: STUDENT IN AN ORGANIZED HEALTH CARE EDUCATION/TRAINING PROGRAM

## 2025-01-06 PROCEDURE — 1160F RVW MEDS BY RX/DR IN RCRD: CPT | Mod: CPTII,S$GLB,, | Performed by: STUDENT IN AN ORGANIZED HEALTH CARE EDUCATION/TRAINING PROGRAM

## 2025-01-06 PROCEDURE — 99215 OFFICE O/P EST HI 40 MIN: CPT | Mod: 25,S$GLB,, | Performed by: STUDENT IN AN ORGANIZED HEALTH CARE EDUCATION/TRAINING PROGRAM

## 2025-01-06 PROCEDURE — 1159F MED LIST DOCD IN RCRD: CPT | Mod: CPTII,S$GLB,, | Performed by: STUDENT IN AN ORGANIZED HEALTH CARE EDUCATION/TRAINING PROGRAM

## 2025-01-06 PROCEDURE — 3075F SYST BP GE 130 - 139MM HG: CPT | Mod: CPTII,S$GLB,, | Performed by: STUDENT IN AN ORGANIZED HEALTH CARE EDUCATION/TRAINING PROGRAM

## 2025-01-06 NOTE — PROGRESS NOTES
OTOLARYNGOLOGY- FACIAL PLASTIC & RECONSTRUCTIVE SURGERY      Gentry Dowling  [id number]    CC:  Chief Complaint   Patient presents with    Sinus Problem     Also sleep issues       HISTORY OF PRESENT ILLNESS: Gentry Dowling  is a 49 y.o. male who was referred to me by Dr. Birdie Arenas in consultation for nasal obstruction.  Gentry reports a multi year history of difficulty breathing through the nose that is constant, and states it is worse on the right side, but bilateral in nature.    There is not a history of nasal trauma.  There is a history of previous nasal surgery.  Patient had septoplasty and ITR in early 2000s. States helped breathing minimally and since surgery has had year around, constant R>L nasal obstruction    There is not a history of nasal allergies/chronic sinus disease. He has not had allergy testing. There are no known allergens. He does not have a history of asthma. He reportsa diagnosis of obstructive sleep apnea. AHI = 6 in 2019 (I suspect higher given symptoms) with prominent night time arousals, daytime sleepiness (naps in late morning) and snoring.     Current sinonasal medications have included long trials of flonase, Navage rinseswith mild improvement and still persistent symptoms with these medications. He  does not regularly use nasal decongestant sprays.      He has not used Breathe-Right strips/nasal cones.     Occupation/Family:  Works with [employer]        Past Medical History  He has a past medical history of Obesity (BMI 30.0-34.9) and Pylephlebitis.    Past Surgical History  He has no past surgical history on file.    Family History  His family history is not on file.    Social History  He reports that he quit smoking about 2 months ago. His smoking use included cigars. He has quit using smokeless tobacco.  His smokeless tobacco use included chew.    Allergies  He has No Known Allergies.    Medications  He has a current medication list which includes the following  "prescription(s): fluticasone propionate, azelastine, heparin, porcine (pf), and normal saline flush.      Review of Systems     Constitutional: Positive for fatigue.      HENT: Positive for postnasal drip and sinus pressure.      Eyes:  Negative for change in eyesight, eye drainage, eye itching and photophobia.     Respiratory:  Positive for sleep apnea and snoring.      Cardiovascular:  Negative for chest pain, foot swelling, irregular heartbeat and swollen veins.     Gastrointestinal:  Negative for abdominal pain, acid reflux, constipation, diarrhea, heartburn and vomiting.     Genitourinary: Positive for frequent urination.     Skin: Negative for rash.     Allergy: Positive for seasonal allergies.     Endocrine: Negative for cold intolerance and heat intolerance.      Neurological: Negative for dizziness, headaches, light-headedness, seizures and tremors.      Hematologic: Negative for bruises/bleeds easily and swollen glands.      Psychiatric: Positive for depression and sleep disturbance.             PHYSICAL EXAM:  /74 (BP Location: Left arm, Patient Position: Sitting)   Pulse 89   Ht 5' 10" (1.778 m)   Wt 116 kg (255 lb 11.7 oz)   BMI 36.69 kg/m²     General: Alert and oriented in no acute distress  Head and Face: Normocephaic, atruamatic  Neurological: Cranial nerves are grossly intact  Skin: Skin texture/appearance is appropriate for age  Eyes:   EOMI, sclera clear  Ears: Pinna are normal in shape and position  EACs healthy appearing bilaterally  TMs clear without YINA or perforation bilaterally  Oral cavity and Oropharynx: Mucous membranes moist without lesions present.  Tongue protrudes midline and palate elevates midline.  Neck: Supple without LAD.    Lungs:breathing comfortably  Psychiatric:  Mood and affect are normal    Nasal Analysis:    Bony and soft tissue support: Class I occlusion with adequate projection of the maxilla; the muscular tone in the nose and perinasal musculature appears " grossly normal  Skin assessment: without visible or palpable scars; Sam II with thick skin  Frontal:  The dorsum is straight and the nose is proportional compared to the facial features.  Nasal base width is proportional compared to intercanthal distance. Alar retraction is not present.  Profile:  The dorsum is straight.  Tip projection is normal and rotation is normal.   Base:   The tip is bulbous in shape. There is a mild amount of nostril asymmetry.   Tip:   Tip support is appropriate.  Septum:  Anterior rhinoscopy reveals the septum is without perforation or synechiae. The septum is deviated to the left   Inferior Turbinates: moderately hypertrophic. There are not pathological secretions present.   Nasal Valves:  The external nasal valve is narrow R>L  The internal nasal valve is narrow and collapsed bilaterally  Modified Garland maneuver does improve breathing in the batten >  position right > left      Procedure: Rigid Nasal Endoscopy, CPT 87676  Surgeon: Isidoro Pittman M.D.  Indication for Procedure:  The patient's diagnostic workup requires a full evaluation of the sinonasal regions, which were not visualized on anterior rhinoscopy.  Anesthesia:  Topical Afrin/Pontocaine spray.    Details of Procedure:  After adequate anesthesia had been achieved, the rigid nasal endoscope was inserted into the left nare.  Using a 3 pass technique sequential examination was performed of the nasal cavity, septum, turbinates, osteomeatal complex, sphenoethmoidal recess, choana, and nasopharynx was performed.  The scope was removed and an identical procedure was performed on the right. The patient tolerated the procedure well, without apparent complications.  Detailed findings are listed below.    Findings: No perforation, OMC clear bilaterally      ASSESSMENT:   1. Obstructive sleep apnea    2. Nasal turbinate hypertrophy            PLAN:     I had a long discussion with the patient regarding their condition and  the further workup and management options.     Gentry's nasal obstruction is related to nasal septal deviation, enlarged inferior turbinates and collapsed nasal valve (R>L), and significantly impacts the patient's quality of life.  Previous medical treatments including topical steroid nasal spray have failed to provide benefit.  There are anatomic abnormalities contributing to the nasal obstruction that require surgical intervention for adequate improvement.    We discussed the operative plan including closed BFG, septal revision and possible R sided LCSG.  I have prepared Gentry for the possibility of requiring auricular cartilage or rib cartilage graft if needed.    The patient likely has under diagnosed sleep apnea, and as such we will order updated home sleep study and have him see Dr. Felton on 1/27.     The risks, benefits, and alternatives of the procedure were discussed in detail including but not limited to bleeding, infection, pain, scar, septal perforation, synechiae, failure to improve, asymmetry or irregularity of the nose, need for repeat procedures, saddle deformity.    We also discussed the expected post operative course and time frame for final healing.  Gentry expressed understanding of the procedure, had all questions answered, and will consider his options. We will plan for joint visit with Dr. Felton on 1/27    I have spent approximately 45 minutes on this patient encounter. This includes face to face time and non-face to face time preparing to see the patient (eg, review of tests), obtaining and/or reviewing separately obtained history, documenting clinical information in the electronic or other health record, independently interpreting results and communicating results to the patient/family/caregiver, or care coordinator.      Isidoro Pittman MD  Facial Plastic and Reconstructive Surgeon  [facility] Otol[facility] - Head & Neck Surgery

## 2025-01-09 ENCOUNTER — TELEPHONE (OUTPATIENT)
Dept: SLEEP MEDICINE | Facility: OTHER | Age: 50
End: 2025-01-09
Payer: MEDICAID

## 2025-01-09 ENCOUNTER — TELEPHONE (OUTPATIENT)
Dept: HEMATOLOGY/ONCOLOGY | Facility: CLINIC | Age: 50
End: 2025-01-09
Payer: MEDICAID

## 2025-01-13 NOTE — PROGRESS NOTES
HEMATOLOGY/ONCOLOGY CLINIC VISIT NOTE    PATIENT: Gentry Dowling  MRN: [id number]  DATE: 1/13/2025    Chief Complaint: hypercoaguable workup    Subjective:    History of Present Illness: Mr. Dowling is a 49 y.o. male who presents for evaluation and management of pylephlebitis of portal vein. His past medical hx is significant for obesity.     Mr. Dowling was recently admitted in November 2024, initially for fever and other associated viral illness symptoms. CT of chest/abd/pelv was done and was suspicious for right portal vein thrombosis and hepatic infarction, although ultrasound done was negative for PVT. MRI subsequently ordered should pylephlebitis of portal vein. He was treated with Zosyn and discharged with IV antibiotic treatment with Ceftriaxone. Referred to hematology by ID for hypercoagulable workup.     Gentry reports he has been doing well overall. His only lingering symptom is fatigue. He feels tired more easily than he used to. Denies recent fever, unintentional weight loss, night sweats, headaches, bruising, bleeding, dizziness, SOB, or chest pain.     Past medical, surgical, family, and social histories have been reviewed and updated below.    Past Medical History:   Past Medical History:   Diagnosis Date    Obesity (BMI 30.0-34.9) 11/27/2024    Pylephlebitis 11/26/2024     Past Surgical History: right knee arthroscope and replacement; Elida procedure; deviated septum; tonsillectomy; more procedures with orthopedic surgery for arthritis     Family History: multiple members with thalassemia minor; multiple members with leukemia or plasma cell disorders     Social History: occasional cigar (1 every month); social drinker; lives in alone in [place]; no dietary restrictions    Allergies:  Seasonal allergies    Medications:  Current Outpatient Medications   Medication Sig Dispense Refill    azelastine (ASTELIN) 137 mcg (0.1 %) nasal spray 1 spray (137 mcg total) by Nasal route 2 (two) times daily. (Patient  "not taking: Reported on 12/26/2024) 30 mL 3    fluticasone propionate (FLONASE) 50 mcg/actuation nasal spray 2 sprays (100 mcg total) by Each Nostril route 2 (two) times daily. (Patient not taking: Reported on 1/14/2025) 18.2 mL 3    heparin, porcine, PF, 10 unit/mL Syrg        No current facility-administered medications for this visit.     Review of Systems   Constitutional:  Positive for activity change (tired more easily) and fatigue. Negative for appetite change, fever and unexpected weight change.   HENT:  Negative for nosebleeds.    Respiratory:  Negative for shortness of breath.    Cardiovascular:  Negative for leg swelling.   Gastrointestinal:  Negative for blood in stool and diarrhea (had some with vancomycin - now resolved).   Genitourinary:  Negative for hematuria.   Musculoskeletal:  Negative for myalgias.   Skin:  Negative for pallor and rash.   Allergic/Immunologic: Positive for environmental allergies.   Neurological:  Negative for dizziness and headaches.   Hematological:  Negative for adenopathy. Does not bruise/bleed easily.     ECOG Performance Status:   ECOG SCORE    0 - Fully active-able to carry on all pre-disease performance without restriction         Objective:      Vitals:   Vitals:    01/14/25 0801   BP: 117/76   BP Location: Right arm   Patient Position: Sitting   Pulse: 91   Resp: 18   Temp: 98.5 °F (36.9 °C)   TempSrc: Oral   SpO2: 98%   Weight: 115.1 kg (253 lb 12 oz)   Height: 5' 10" (1.778 m)     BMI: Body mass index is 36.41 kg/m².    Physical Exam  Constitutional:       General: He is not in acute distress.  HENT:      Nose: Nose normal.   Eyes:      General: No scleral icterus.  Cardiovascular:      Rate and Rhythm: Normal rate and regular rhythm.      Pulses: Normal pulses.      Heart sounds: Normal heart sounds. No murmur heard.  Pulmonary:      Effort: Pulmonary effort is normal. No respiratory distress.      Breath sounds: Normal breath sounds.   Musculoskeletal:      Right " lower leg: No edema.      Left lower leg: No edema.   Skin:     General: Skin is warm.      Capillary Refill: Capillary refill takes less than 2 seconds.      Coloration: Skin is not jaundiced or pale.   Neurological:      General: No focal deficit present.      Mental Status: He is alert and oriented to person, place, and time.         Laboratory Data:  Labs have been reviewed.    Imaging:   MRI ABDOMEN W WO CONTRAST     CLINICAL HISTORY:  Abdominal abscess/infection suspected;Patient with likley intra-abdominal infection; still searching pathogen/etiology; would like comparison to prior in hopes of resolution/improvement in pylephlebitis in hepatic vessel;     TECHNIQUE:  Multisequence, multiplanar MRI of the abdomen performed per liver protocol before and after administration of 10 mL gadobutrol intravenous contrast.     COMPARISON:  MRI 11/21/2024, ultrasound 11/18/2024, CT 11/18/2024     FINDINGS:  LOWER THORAX:  Unremarkable.     LIVER: Enlarged measuring 20 cm craniocaudal.  No global hepatic signal abnormality.  Overall slightly less prominent branching tubular areas of hypoenhancement in the posterior right liver with an adjacent wedge-shaped area of arterial hyperenhancement that becomes isointense on delayed phase with associated T2 signal hyperintensity suggestive of edema.  No intrahepatic fluid collections to suggest abscess.  No solid masses.     BILIARY:  Normal gallbladder. No biliary ductal dilatation.     SPLEEN:  No splenomegaly. Small splenic cyst inferiorly.     PANCREAS:  No focal masses or ductal dilatation.     ADRENALS:  No adrenal nodules.     KIDNEYS/URETERS:  No hydronephrosis or solid mass lesions.     PERITONEUM / RETROPERITONEUM:  No upper abdominal free fluid.     LYMPH NODES:  No upper abdominal lymphadenopathy.     VESSELS:  Similar filling defect in the mid to distal portion of the posterior right portal vein compatible with thrombus.  Remaining portal veins and hepatic veins are  patent.     GI TRACT:  No distention or wall thickening.     BONES AND SOFT TISSUES:  No marrow replacing lesions.     Impression:     Continued branching tubular areas of hypoenhancement in the right posterior liver with adjacent edema, overall slightly improved and remains consistent pylephlebitis.  No abscess.  Recommend continued follow-up.    MRI ABDOMEN W WO CONTRAST     CLINICAL HISTORY:  R portal vein pyelephhlebitis;  Phlebitis of portal vein     TECHNIQUE:  Multiplanar multisequence images of the abdomen before and after administration of 10 mL Gadavist intravenous contrast.     COMPARISON:  MRI 11/25/2024, 11/21/2024     FINDINGS:  Inferior Thorax: Partially visualized heart and lungs are without significant abnormality.     Liver: Mildly enlarged measuring 17.7 cm in craniocaudal dimension.  No signal dropout on out of phase imaging.  Previously identified edema surrounding hypointense branching tubular areas in the right posterior liver now demonstrates hyperenhancement to background liver parenchyma with improved T2 signal hyperintensity.  Findings likely related to transient hepatic signal difference in setting of resolving pylephlebitis.  No enhancing lesion.  Stable subcentimeter right hepatic lobe simple cyst.     Gallbladder: No gallstones.  No wall thickening or pericholecystic inflammatory changes.     Bile Ducts: No intrahepatic or extrahepatic ductal dilatation.     Pancreas: No enhancing mass.  No pancreatic duct dilatation.  No peripancreatic inflammatory changes.     Spleen: Normal size.  Stable splenic cyst inferiorly.     Adrenals: No significant abnormality.     Kidneys/Ureters: No hydronephrosis or solid mass lesions.     GI tract/Mesentery: No evidence of bowel obstruction or inflammation.     Peritoneal Space: No ascites.     Retroperitoneum: No pathologically enlarged nodes.     Abdominal wall: No significant abnormality.     Vasculature:  Resolution of prior filling defect within  the right posterior portal vein. Remaining portal vasculature and hepatic veins are patent. Aorta maintains normal course and caliber. No aneurysm.     Bones: Normal marrow signal.     Impression:     Previously visualized posterior right portal vein thrombus has resolved with improved surrounding edematous changes.  Assessment:       1. Pylephlebitis    2. Obesity (BMI 30.0-34.9)    3. Fatigue, unspecified type      Plan:     Pylephlebitis  - Often a result of an infection in the abdomen.  - He has completed antibiotic therapy and follow up with infectious disease.  - There is no current concrete recommendations for anticoagulation for pylephlebitis unless there is an increased thrombotic risk  - Will complete hypercoagulable workup to r/o thrombophilia. Follow up in 6 weeks with results    Fatigue  - Hemoglobin mildly decreased at 13.2 gm/dL  - He is still feeling very fatigued, no SOB, chest pain, lightheadedness, dizziness  - Will also get repeat CBC, ferritin, iron studies, and vitamin B12 to r/o deficiencies    Advance care planning  - I initiated the process of advance care planning today and explained the importance of this process to the patient.  I introduced the concept of advance directives to the patient, as well. Then the patient received detailed information about the importance of designating a Health Care Power of  (HCPOA). He was also instructed to communicate with this person about their wishes for future healthcare, should he become sick and lose decision-making capacity. The patient has not previously appointed a HCPOA. After our discussion, the patient has decided to complete a HCPOA and has appointed his mother, Viky Dowling. The forms were completed and will be scanned into EPIC. I spent a total time of 17 minutes discussing this issue with the patient.    I spent a total of 43 minutes on the day of the visit.This includes face to face time and non-face to face time preparing to  see the patient (eg, review of tests), obtaining and/or reviewing separately obtained history, documenting clinical information in the electronic or other health record, independently interpreting results and communicating results to the patient/family/caregiver, or care coordinator.     Keyla Dickens PA-C  1/14/2025 8:27 AM   Hematology/Oncology  [facility] [address], [address]  [place] [address]  Phone: [phone]  Fax: [phone]

## 2025-01-14 ENCOUNTER — LAB VISIT (OUTPATIENT)
Dept: LAB | Facility: HOSPITAL | Age: 50
End: 2025-01-14
Attending: PHYSICIAN ASSISTANT
Payer: COMMERCIAL

## 2025-01-14 ENCOUNTER — TELEPHONE (OUTPATIENT)
Dept: SLEEP MEDICINE | Facility: OTHER | Age: 50
End: 2025-01-14
Payer: COMMERCIAL

## 2025-01-14 ENCOUNTER — OFFICE VISIT (OUTPATIENT)
Dept: HEMATOLOGY/ONCOLOGY | Facility: CLINIC | Age: 50
End: 2025-01-14
Payer: COMMERCIAL

## 2025-01-14 ENCOUNTER — PATIENT MESSAGE (OUTPATIENT)
Dept: HEMATOLOGY/ONCOLOGY | Facility: CLINIC | Age: 50
End: 2025-01-14

## 2025-01-14 VITALS
TEMPERATURE: 99 F | OXYGEN SATURATION: 98 % | HEIGHT: 70 IN | BODY MASS INDEX: 36.33 KG/M2 | RESPIRATION RATE: 18 BRPM | WEIGHT: 253.75 LBS | DIASTOLIC BLOOD PRESSURE: 76 MMHG | HEART RATE: 91 BPM | SYSTOLIC BLOOD PRESSURE: 117 MMHG

## 2025-01-14 DIAGNOSIS — R53.83 FATIGUE, UNSPECIFIED TYPE: ICD-10-CM

## 2025-01-14 DIAGNOSIS — K75.1: ICD-10-CM

## 2025-01-14 DIAGNOSIS — E66.811 OBESITY (BMI 30.0-34.9): ICD-10-CM

## 2025-01-14 DIAGNOSIS — K75.1: Primary | ICD-10-CM

## 2025-01-14 LAB
AT III ACT/NOR PPP CHRO: 112 % (ref 83–118)
BASOPHILS # BLD AUTO: 0.07 K/UL (ref 0–0.2)
BASOPHILS NFR BLD: 1.1 % (ref 0–1.9)
DIFFERENTIAL METHOD BLD: NORMAL
EOSINOPHIL # BLD AUTO: 0.1 K/UL (ref 0–0.5)
EOSINOPHIL NFR BLD: 2.3 % (ref 0–8)
ERYTHROCYTE [DISTWIDTH] IN BLOOD BY AUTOMATED COUNT: 14.2 % (ref 11.5–14.5)
FERRITIN SERPL-MCNC: 179 NG/ML (ref 20–300)
HCT VFR BLD AUTO: 44.5 % (ref 40–54)
HGB BLD-MCNC: 15 G/DL (ref 14–18)
IMM GRANULOCYTES # BLD AUTO: 0.01 K/UL (ref 0–0.04)
IMM GRANULOCYTES NFR BLD AUTO: 0.2 % (ref 0–0.5)
IRON SERPL-MCNC: 99 UG/DL (ref 45–160)
LYMPHOCYTES # BLD AUTO: 1.2 K/UL (ref 1–4.8)
LYMPHOCYTES NFR BLD: 19.8 % (ref 18–48)
MCH RBC QN AUTO: 28.5 PG (ref 27–31)
MCHC RBC AUTO-ENTMCNC: 33.7 G/DL (ref 32–36)
MCV RBC AUTO: 84 FL (ref 82–98)
MONOCYTES # BLD AUTO: 0.4 K/UL (ref 0.3–1)
MONOCYTES NFR BLD: 5.8 % (ref 4–15)
NEUTROPHILS # BLD AUTO: 4.4 K/UL (ref 1.8–7.7)
NEUTROPHILS NFR BLD: 70.8 % (ref 38–73)
NRBC BLD-RTO: 0 /100 WBC
PLATELET # BLD AUTO: 266 K/UL (ref 150–450)
PMV BLD AUTO: 10 FL (ref 9.2–12.9)
RBC # BLD AUTO: 5.27 M/UL (ref 4.6–6.2)
SATURATED IRON: 23 % (ref 20–50)
TOTAL IRON BINDING CAPACITY: 431 UG/DL (ref 250–450)
TRANSFERRIN SERPL-MCNC: 291 MG/DL (ref 200–375)
VIT B12 SERPL-MCNC: 681 PG/ML (ref 210–950)
WBC # BLD AUTO: 6.2 K/UL (ref 3.9–12.7)

## 2025-01-14 PROCEDURE — 1159F MED LIST DOCD IN RCRD: CPT | Mod: CPTII,S$GLB,, | Performed by: PHYSICIAN ASSISTANT

## 2025-01-14 PROCEDURE — 99203 OFFICE O/P NEW LOW 30 MIN: CPT | Mod: S$GLB,,, | Performed by: PHYSICIAN ASSISTANT

## 2025-01-14 PROCEDURE — 99214 OFFICE O/P EST MOD 30 MIN: CPT | Mod: PBBFAC,PO | Performed by: PHYSICIAN ASSISTANT

## 2025-01-14 PROCEDURE — 83540 ASSAY OF IRON: CPT | Performed by: PHYSICIAN ASSISTANT

## 2025-01-14 PROCEDURE — 85306 CLOT INHIBIT PROT S FREE: CPT | Performed by: PHYSICIAN ASSISTANT

## 2025-01-14 PROCEDURE — 3008F BODY MASS INDEX DOCD: CPT | Mod: CPTII,S$GLB,, | Performed by: PHYSICIAN ASSISTANT

## 2025-01-14 PROCEDURE — 85025 COMPLETE CBC W/AUTO DIFF WBC: CPT | Performed by: PHYSICIAN ASSISTANT

## 2025-01-14 PROCEDURE — 85300 ANTITHROMBIN III ACTIVITY: CPT | Performed by: PHYSICIAN ASSISTANT

## 2025-01-14 PROCEDURE — 82607 VITAMIN B-12: CPT | Performed by: PHYSICIAN ASSISTANT

## 2025-01-14 PROCEDURE — 82728 ASSAY OF FERRITIN: CPT | Performed by: PHYSICIAN ASSISTANT

## 2025-01-14 PROCEDURE — 3078F DIAST BP <80 MM HG: CPT | Mod: CPTII,S$GLB,, | Performed by: PHYSICIAN ASSISTANT

## 2025-01-14 PROCEDURE — 3074F SYST BP LT 130 MM HG: CPT | Mod: CPTII,S$GLB,, | Performed by: PHYSICIAN ASSISTANT

## 2025-01-14 PROCEDURE — 99999 PR PBB SHADOW E&M-EST. PATIENT-LVL IV: CPT | Mod: PBBFAC,,, | Performed by: PHYSICIAN ASSISTANT

## 2025-01-14 PROCEDURE — 85613 RUSSELL VIPER VENOM DILUTED: CPT | Performed by: PHYSICIAN ASSISTANT

## 2025-01-14 PROCEDURE — 85303 CLOT INHIBIT PROT C ACTIVITY: CPT | Performed by: PHYSICIAN ASSISTANT

## 2025-01-15 ENCOUNTER — TELEPHONE (OUTPATIENT)
Dept: SLEEP MEDICINE | Facility: OTHER | Age: 50
End: 2025-01-15
Payer: COMMERCIAL

## 2025-01-16 ENCOUNTER — HOSPITAL ENCOUNTER (OUTPATIENT)
Dept: SLEEP MEDICINE | Facility: HOSPITAL | Age: 50
Discharge: HOME | End: 2025-01-16
Attending: PSYCHIATRY & NEUROLOGY
Payer: MEDICAID

## 2025-01-16 DIAGNOSIS — G47.33 OBSTRUCTIVE SLEEP APNEA: ICD-10-CM

## 2025-01-16 DIAGNOSIS — G47.33 OSA (OBSTRUCTIVE SLEEP APNEA): Primary | ICD-10-CM

## 2025-01-16 LAB
PROT C ACT/NOR PPP CHRO: 106 % (ref 70–150)
PROT S ACT/NOR PPP: 56 % (ref 65–150)

## 2025-01-16 PROCEDURE — 95800 SLP STDY UNATTENDED: CPT

## 2025-01-16 PROCEDURE — 95806 SLEEP STUDY UNATT&RESP EFFT: CPT | Mod: 26,,, | Performed by: PSYCHIATRY & NEUROLOGY

## 2025-01-27 ENCOUNTER — PATIENT MESSAGE (OUTPATIENT)
Dept: OTOLARYNGOLOGY | Facility: CLINIC | Age: 50
End: 2025-01-27

## 2025-01-27 PROBLEM — G47.33 OSA (OBSTRUCTIVE SLEEP APNEA): Status: ACTIVE | Noted: 2025-01-27

## 2025-01-27 PROBLEM — G47.33 OBSTRUCTIVE SLEEP APNEA: Status: ACTIVE | Noted: 2025-01-27

## 2025-01-28 NOTE — PROCEDURES
"Hi Dr. Pittman,     You have ordered sleep LAB services to perform the sleep study for Gentry Dowling. The sleep study that you ordered is complete.     Please find Sleep Study result in  the "Media tab" of Chart Review; you can look  for the report in the  Media by the document type "Sleep Study Documents".       As the ordering provider, you are responsible for reviewing the results and implementing a treatment plan with your patient.      If you need a Sleep Medicine provider to explain the sleep study findings and arrange treatment for the patient, please refer patient for consultation to our Sleep Clinic via [facility] with Ambulatory Consult Sleep.The wait time to be seen in the sleep clinic is currently several months unfortunately, we are working on recruiting more providers.     To do that please place an order for an  "Ambulatory Consult Sleep" - it will go to our clinic work queue for our Medical Assistant to contact the patient for an appointment.     For any questions, please contact our clinic staff at [phone] to talk to clinical staff        Briana Orta MD    "

## 2025-02-04 DIAGNOSIS — G47.33 OBSTRUCTIVE SLEEP APNEA: Primary | ICD-10-CM

## 2025-02-11 ENCOUNTER — OFFICE VISIT (OUTPATIENT)
Dept: SLEEP MEDICINE | Facility: CLINIC | Age: 50
End: 2025-02-11
Payer: COMMERCIAL

## 2025-02-11 VITALS
DIASTOLIC BLOOD PRESSURE: 85 MMHG | HEIGHT: 70 IN | BODY MASS INDEX: 36.89 KG/M2 | SYSTOLIC BLOOD PRESSURE: 125 MMHG | WEIGHT: 257.69 LBS

## 2025-02-11 DIAGNOSIS — R35.1 NOCTURIA: ICD-10-CM

## 2025-02-11 DIAGNOSIS — G47.33 OSA (OBSTRUCTIVE SLEEP APNEA): Primary | ICD-10-CM

## 2025-02-11 DIAGNOSIS — F51.09 OTHER INSOMNIA NOT DUE TO A SUBSTANCE OR KNOWN PHYSIOLOGICAL CONDITION: ICD-10-CM

## 2025-02-11 DIAGNOSIS — G47.10 HYPERSOMNOLENCE: ICD-10-CM

## 2025-02-11 PROCEDURE — 1160F RVW MEDS BY RX/DR IN RCRD: CPT | Mod: CPTII,S$GLB,, | Performed by: PHYSICIAN ASSISTANT

## 2025-02-11 PROCEDURE — 3008F BODY MASS INDEX DOCD: CPT | Mod: CPTII,S$GLB,, | Performed by: PHYSICIAN ASSISTANT

## 2025-02-11 PROCEDURE — 3074F SYST BP LT 130 MM HG: CPT | Mod: CPTII,S$GLB,, | Performed by: PHYSICIAN ASSISTANT

## 2025-02-11 PROCEDURE — 3079F DIAST BP 80-89 MM HG: CPT | Mod: CPTII,S$GLB,, | Performed by: PHYSICIAN ASSISTANT

## 2025-02-11 PROCEDURE — 99999 PR PBB SHADOW E&M-EST. PATIENT-LVL III: CPT | Mod: PBBFAC,,, | Performed by: PHYSICIAN ASSISTANT

## 2025-02-11 PROCEDURE — 1159F MED LIST DOCD IN RCRD: CPT | Mod: CPTII,S$GLB,, | Performed by: PHYSICIAN ASSISTANT

## 2025-02-11 PROCEDURE — 99204 OFFICE O/P NEW MOD 45 MIN: CPT | Mod: S$GLB,,, | Performed by: PHYSICIAN ASSISTANT

## 2025-02-11 NOTE — PROGRESS NOTES
Referred by No ref. provider found     NEW PATIENT VISIT    Gentry Dowling  is a pleasant 49 y.o. male  with PMH significant for depression, BMI 36+, ROXANNE  who presents for ROXANNE management following referral from ENT      C/o snoring, gasping/chocking arousals, poor disrupted and un-refreshing sleep, frequent nocturia, and excessive daytime sleepiness and fatigue. Reports hx ROXANNE in 2019 (AHI 6, RDI 25). Reports as the time was told ROXANNE mild and sx were mild so tried OTC mouth guard for tx (did not work). States since October of last year (2024) sx have progressively worsening, including waking up, up to 7 times nightly for nocturia and significant nasal congestion when lying flat/dry mouth, and gasping/choking arousals, which never happened previously. States he was evaluated with ENT who ordered repeat HST for evaluation (AHI 78). States he was referred to sleep medicine to discuss Hst results in greater detail and discuss treatment options/advice.       SLEEP SCHEDULE   Environment    Bed Time 9:30-11PM   Sleep Latency No issues   Arousals frequent    Nocturia 3-6   Back to sleep Usually quick   Wake time 6AM   Naps Tries to sneak in naps when able, but still always tired    Work        Past Medical History:   Diagnosis Date    Obesity (BMI 30.0-34.9) 11/27/2024    Pylephlebitis 11/26/2024     Patient Active Problem List   Diagnosis    Fever of undetermined origin    LOGAN (acute kidney injury)    Pylephlebitis    Obesity (BMI 30.0-34.9)    Obstructive sleep apnea    ROXANNE (obstructive sleep apnea)       Current Outpatient Medications:     azelastine (ASTELIN) 137 mcg (0.1 %) nasal spray, 1 spray (137 mcg total) by Nasal route 2 (two) times daily. (Patient not taking: Reported on 12/26/2024), Disp: 30 mL, Rfl: 3    fluticasone propionate (FLONASE) 50 mcg/actuation nasal spray, 2 sprays (100 mcg total) by Each Nostril route 2 (two) times daily. (Patient not taking: Reported on 2/11/2025), Disp: 18.2 mL, Rfl: 3     "heparin, porcine, PF, 10 unit/mL Syrg, , Disp: , Rfl:        Vitals:    25 1004   BP: 125/85   BP Location: Right arm   Patient Position: Sitting   Pulse: (P) 105   Weight: 116.9 kg (257 lb 11.5 oz)   Height: 5' 10" (1.778 m)     Physical Exam:    GEN:   Well-appearing  Psych:  Appropriate affect, demonstrates insight  SKIN:  No rash on the face or bridge of the nose      LABS:   Lab Results   Component Value Date    HGB 15.0 2025    CO2 27 2024         RECORDS REVIEWED:    3/22/19 HST: AHI 6, RDI 25, miguel 90%  25 HST: AHI 78, RDI 78, miguel 58%    ASSESSMENT    Holland Sleepiness Scale:  Sitting and readin  Watching TV:    2  Passenger in a car x 1 hr:  0  Sitting quietly after lunch:  3  Lying down to rest in PM:  3  Sitting, inactive in public:  0  Sitting+ talking to someone:  0  Stopped in traffic:   0  Total    10/24    PROBLEM DESCRIPTION/ Sx on Presentation  STATUS   Hx ROXANNE   + snoring, + gasping/choking arousals, + witnessed apneas  + wakes with dry mouth  + wakes feeling un-refreshed  Sx recently started worsening late   HST  AHI 78  New   Daytime Sx   + sleepiness when inactive   ESS 10/24 on intake  New   Insomnia   Trouble falling asleep: no issues  Arousals:         frequent  Hard to get back to sleep?: no issues    Prior pertinent medications:  Current pertinent medications:   New   Nocturia   x 3-6 per sleep period  New   Other issues:       PLAN      -discussed results of HST with patient in great detail  -discussed treatment options for ROXANNE including CPAP (gold standard), inspire, and MAD  -recommended trial of CPAP, patient is open  -CPAP and supplies ordered  -discussed ROXANNE and PAP with patient in detail, including possible complications of untreated ROXANNE like heart attack/stroke  -advised on strict driving precautions; advised never to drive drowsy     Advised on plan of care. Answered all patient questions. Patient verbalized understanding and voiced " agreement with plan of care.     RTC  will need follow up 31-90 days after receiving CPAP machine for compliance       The patient was given open opportunity to ask questions and/or express concerns about treatment plan. All questions/concerns were discussed.     Two patient identifiers used prior to evaluation.

## 2025-02-24 ENCOUNTER — PATIENT MESSAGE (OUTPATIENT)
Dept: SLEEP MEDICINE | Facility: CLINIC | Age: 50
End: 2025-02-24
Payer: COMMERCIAL

## 2025-02-24 DIAGNOSIS — G47.33 OSA (OBSTRUCTIVE SLEEP APNEA): Primary | ICD-10-CM

## 2025-02-25 ENCOUNTER — OFFICE VISIT (OUTPATIENT)
Dept: HEMATOLOGY/ONCOLOGY | Facility: CLINIC | Age: 50
End: 2025-02-25
Payer: COMMERCIAL

## 2025-02-25 DIAGNOSIS — D68.51 HETEROZYGOUS FACTOR V LEIDEN MUTATION: Primary | ICD-10-CM

## 2025-02-25 DIAGNOSIS — D68.59 PROTEIN S DEFICIENCY: ICD-10-CM

## 2025-02-25 DIAGNOSIS — K75.1: ICD-10-CM

## 2025-02-25 DIAGNOSIS — R53.83 FATIGUE, UNSPECIFIED TYPE: ICD-10-CM

## 2025-02-25 NOTE — PROGRESS NOTES
HEMATOLOGY/ONCOLOGY CLINIC VISIT NOTE    PATIENT: Gentry Dowling  MRN: [id number]  DATE: 2/24/2025    Chief Complaint: h/o Pylephlebitis and heterozygous FVL    The patient location is: in [place] (parked in his car)    Visit type: audiovisual    Face to Face time with patient: 10  25 minutes of total time spent on the encounter, which includes face to face time and non-face to face time preparing to see the patient (eg, review of tests), Obtaining and/or reviewing separately obtained history, Documenting clinical information in the electronic or other health record, Independently interpreting results (not separately reported) and communicating results to the patient/family/caregiver, or Care coordination (not separately reported).     Each patient to whom he or she provides medical services by telemedicine is:  (1) informed of the relationship between the physician and patient and the respective role of any other health care provider with respect to management of the patient; and (2) notified that he or she may decline to receive medical services by telemedicine and may withdraw from such care at any time.    Subjective:    History of Present Illness: Mr. Dowling is a 49 y.o. male who presents for evaluation and management of pylephlebitis of portal vein. His past medical hx is significant for obesity and recent diagnosis of ROXANNE (waiting on CPAP arrival).     Mr. Dowling was admitted in November 2024, initially for fever and other associated viral illness symptoms. CT of chest/abd/pelv was done and was suspicious for right portal vein thrombosis and hepatic infarction, although ultrasound done was negative for PVT. MRI subsequently ordered should pylephlebitis of portal vein. He was treated with Zosyn and discharged with IV antibiotic treatment with Ceftriaxone. Referred to hematology by ID for hypercoagulable workup.     He is heterozygous for FVL based on lab work from January 2025.     Interval Hx 2/25/2025:    Murtaza is doing well today. He still reports fatigue, but attributes this to his recent diagnosis of ROXANNE. He is awaiting arrival of his CPAP this week. Pt denies any fever, abdominal pain, chest pain, SOB, vomiting, diarrhea, leg swelling, or any other concerns. He reports that he may need shoulder surgery in the near future, but will let us know when this is going to happen.     Past medical, surgical, family, and social histories have been reviewed and updated below.    Past Medical History:   Past Medical History:   Diagnosis Date    Obesity (BMI 30.0-34.9) 11/27/2024    Pylephlebitis 11/26/2024     Past Surgical History: right knee arthroscope and replacement; Elida procedure; deviated septum; tonsillectomy; more procedures with orthopedic surgery for arthritis     Family History: multiple members with thalassemia minor; multiple members with leukemia or plasma cell disorders     Social History: occasional cigar (1 every month); social drinker; lives in alone in [place]; no dietary restrictions    Allergies:  Seasonal allergies    Medications:  Current Outpatient Medications   Medication Sig Dispense Refill    azelastine (ASTELIN) 137 mcg (0.1 %) nasal spray 1 spray (137 mcg total) by Nasal route 2 (two) times daily. (Patient not taking: Reported on 12/26/2024) 30 mL 3    fluticasone propionate (FLONASE) 50 mcg/actuation nasal spray 2 sprays (100 mcg total) by Each Nostril route 2 (two) times daily. (Patient not taking: Reported on 2/11/2025) 18.2 mL 3    heparin, porcine, PF, 10 unit/mL Syrg        No current facility-administered medications for this visit.     Review of Systems   Constitutional:  Positive for activity change (tired more easily) and fatigue. Negative for appetite change, fever and unexpected weight change.   HENT:  Negative for nosebleeds.    Respiratory:  Negative for shortness of breath.    Cardiovascular:  Negative for leg swelling.   Gastrointestinal:  Negative for blood in stool and  diarrhea (had some with vancomycin - now resolved).   Genitourinary:  Negative for hematuria.   Musculoskeletal:  Negative for myalgias.   Skin:  Negative for pallor and rash.   Allergic/Immunologic: Positive for environmental allergies.   Neurological:  Negative for dizziness and headaches.   Hematological:  Negative for adenopathy. Does not bruise/bleed easily.     ECOG Performance Status:   ECOG SCORE    0 - Fully active-able to carry on all pre-disease performance without restriction         Objective:      Virtual Visit - no vitals or physical exam    Laboratory Data:  Labs have been reviewed.          Imaging:   MRI ABDOMEN W WO CONTRAST     CLINICAL HISTORY:  Abdominal abscess/infection suspected;Patient with likley intra-abdominal infection; still searching pathogen/etiology; would like comparison to prior in hopes of resolution/improvement in pylephlebitis in hepatic vessel;     TECHNIQUE:  Multisequence, multiplanar MRI of the abdomen performed per liver protocol before and after administration of 10 mL gadobutrol intravenous contrast.     COMPARISON:  MRI 11/21/2024, ultrasound 11/18/2024, CT 11/18/2024     FINDINGS:  LOWER THORAX:  Unremarkable.     LIVER: Enlarged measuring 20 cm craniocaudal.  No global hepatic signal abnormality.  Overall slightly less prominent branching tubular areas of hypoenhancement in the posterior right liver with an adjacent wedge-shaped area of arterial hyperenhancement that becomes isointense on delayed phase with associated T2 signal hyperintensity suggestive of edema.  No intrahepatic fluid collections to suggest abscess.  No solid masses.     BILIARY:  Normal gallbladder. No biliary ductal dilatation.     SPLEEN:  No splenomegaly. Small splenic cyst inferiorly.     PANCREAS:  No focal masses or ductal dilatation.     ADRENALS:  No adrenal nodules.     KIDNEYS/URETERS:  No hydronephrosis or solid mass lesions.     PERITONEUM / RETROPERITONEUM:  No upper abdominal free  fluid.     LYMPH NODES:  No upper abdominal lymphadenopathy.     VESSELS:  Similar filling defect in the mid to distal portion of the posterior right portal vein compatible with thrombus.  Remaining portal veins and hepatic veins are patent.     GI TRACT:  No distention or wall thickening.     BONES AND SOFT TISSUES:  No marrow replacing lesions.     Impression:     Continued branching tubular areas of hypoenhancement in the right posterior liver with adjacent edema, overall slightly improved and remains consistent pylephlebitis.  No abscess.  Recommend continued follow-up.    MRI ABDOMEN W WO CONTRAST     CLINICAL HISTORY:  R portal vein pyelephhlebitis;  Phlebitis of portal vein     TECHNIQUE:  Multiplanar multisequence images of the abdomen before and after administration of 10 mL Gadavist intravenous contrast.     COMPARISON:  MRI 11/25/2024, 11/21/2024     FINDINGS:  Inferior Thorax: Partially visualized heart and lungs are without significant abnormality.     Liver: Mildly enlarged measuring 17.7 cm in craniocaudal dimension.  No signal dropout on out of phase imaging.  Previously identified edema surrounding hypointense branching tubular areas in the right posterior liver now demonstrates hyperenhancement to background liver parenchyma with improved T2 signal hyperintensity.  Findings likely related to transient hepatic signal difference in setting of resolving pylephlebitis.  No enhancing lesion.  Stable subcentimeter right hepatic lobe simple cyst.     Gallbladder: No gallstones.  No wall thickening or pericholecystic inflammatory changes.     Bile Ducts: No intrahepatic or extrahepatic ductal dilatation.     Pancreas: No enhancing mass.  No pancreatic duct dilatation.  No peripancreatic inflammatory changes.     Spleen: Normal size.  Stable splenic cyst inferiorly.     Adrenals: No significant abnormality.     Kidneys/Ureters: No hydronephrosis or solid mass lesions.     GI tract/Mesentery: No evidence of  bowel obstruction or inflammation.     Peritoneal Space: No ascites.     Retroperitoneum: No pathologically enlarged nodes.     Abdominal wall: No significant abnormality.     Vasculature:  Resolution of prior filling defect within the right posterior portal vein. Remaining portal vasculature and hepatic veins are patent. Aorta maintains normal course and caliber. No aneurysm.     Bones: Normal marrow signal.     Impression:     Previously visualized posterior right portal vein thrombus has resolved with improved surrounding edematous changes.  Assessment:       1. Heterozygous factor V Leiden mutation    2. Pylephlebitis    3. Protein S deficiency    4. Fatigue, unspecified type        Plan:     Heterozygous Factor V Leiden  - We discussed that Factor V Leiden is a genetic mutation that increases the risk for developing abnormal blood clots, especially in the veins (DVTs/PE)  - Heterozygous FVL (one copy of mutation) has ~3-7z increased risk of clotting  - With this diagnosis alone, we do not recommend anticoagulation at this time. However, we discussed lifestyle modifications to help prevent clots in the future including: avoiding cigarette/tobacco use (including cigars), adequate hydration, use of compression socks with traveling long distances in cars or planes, and weight loss.   - Gentry may benefit from use of anticoagulation if there is any planned surgeries with immobilization.     Low protein S activity   - FVL mutation leads to resistance to activated protein C. Since Protein S is a cofactor for Protein C, its function can be impaired, sometimes leading to lwoer measurable protein S activity even if total protein S levels are normal.   - Although you can have a true protein S deficiency + FVL mutation, a low measurable protein S can also be caused by inflammation/infection, acute thrombosis, and liver disease.  - Will get protein S free and total antigen with repeat activity level at earliest  convenience.     Pylephlebitis   - Often a result of an infection in the abdomen.  - He has completed antibiotic therapy and follow up with infectious disease.  - There is no current concrete recommendations for anticoagulation for pylephlebitis unless there is an increased thrombotic risk  - Last MRI 12/2024 with resolution    Fatigue  - Hemoglobin was back to normal at 15 g/dL, stable   - Last iron studies were normal  - Likely 2/2 ROXANNE. Getting CPAP on Friday  - Will continue to monitor     Keyla Dickens PA-C  2/24/2025 8:27 AM   Hematology/Oncology  [facility] - [address], [address]  [place] [address]  Phone: [phone]  Fax: [phone]

## 2025-06-02 ENCOUNTER — PATIENT MESSAGE (OUTPATIENT)
Dept: HEMATOLOGY/ONCOLOGY | Facility: CLINIC | Age: 50
End: 2025-06-02
Payer: COMMERCIAL

## 2025-06-03 DIAGNOSIS — D68.51 HETEROZYGOUS FACTOR V LEIDEN MUTATION: Primary | ICD-10-CM

## 2025-07-13 ENCOUNTER — PATIENT MESSAGE (OUTPATIENT)
Dept: ADMINISTRATIVE | Facility: OTHER | Age: 50
End: 2025-07-13
Payer: COMMERCIAL

## 2025-08-22 ENCOUNTER — LAB VISIT (OUTPATIENT)
Dept: LAB | Facility: HOSPITAL | Age: 50
End: 2025-08-22
Attending: PHYSICIAN ASSISTANT
Payer: COMMERCIAL

## 2025-08-22 DIAGNOSIS — K75.1: ICD-10-CM

## 2025-08-22 DIAGNOSIS — D68.51 HETEROZYGOUS FACTOR V LEIDEN MUTATION: ICD-10-CM

## 2025-08-22 LAB
ABSOLUTE EOSINOPHIL (OHS): 0.13 K/UL
ABSOLUTE MONOCYTE (OHS): 0.32 K/UL (ref 0.3–1)
ABSOLUTE NEUTROPHIL COUNT (OHS): 4.25 K/UL (ref 1.8–7.7)
ALBUMIN SERPL BCP-MCNC: 3.8 G/DL (ref 3.5–5.2)
ALP SERPL-CCNC: 85 UNIT/L (ref 40–150)
ALT SERPL W/O P-5'-P-CCNC: 19 UNIT/L (ref 10–44)
ANION GAP (OHS): 8 MMOL/L (ref 8–16)
AST SERPL-CCNC: 20 UNIT/L (ref 11–45)
BASOPHILS # BLD AUTO: 0.05 K/UL
BASOPHILS NFR BLD AUTO: 0.8 %
BILIRUB SERPL-MCNC: 1.2 MG/DL (ref 0.1–1)
BUN SERPL-MCNC: 16 MG/DL (ref 6–20)
CALCIUM SERPL-MCNC: 8.8 MG/DL (ref 8.7–10.5)
CHLORIDE SERPL-SCNC: 110 MMOL/L (ref 95–110)
CO2 SERPL-SCNC: 28 MMOL/L (ref 23–29)
CREAT SERPL-MCNC: 0.9 MG/DL (ref 0.5–1.4)
ERYTHROCYTE [DISTWIDTH] IN BLOOD BY AUTOMATED COUNT: 13.4 % (ref 11.5–14.5)
GFR SERPLBLD CREATININE-BSD FMLA CKD-EPI: >60 ML/MIN/1.73/M2
GLUCOSE SERPL-MCNC: 72 MG/DL (ref 70–110)
HCT VFR BLD AUTO: 42.6 % (ref 40–54)
HGB BLD-MCNC: 14 GM/DL (ref 14–18)
IMM GRANULOCYTES # BLD AUTO: 0.02 K/UL (ref 0–0.04)
IMM GRANULOCYTES NFR BLD AUTO: 0.3 % (ref 0–0.5)
LYMPHOCYTES # BLD AUTO: 1.12 K/UL (ref 1–4.8)
MCH RBC QN AUTO: 28.5 PG (ref 27–31)
MCHC RBC AUTO-ENTMCNC: 32.9 G/DL (ref 32–36)
MCV RBC AUTO: 87 FL (ref 82–98)
NUCLEATED RBC (/100WBC) (OHS): 0 /100 WBC
PLATELET # BLD AUTO: 216 K/UL (ref 150–450)
PMV BLD AUTO: 10.2 FL (ref 9.2–12.9)
POTASSIUM SERPL-SCNC: 4 MMOL/L (ref 3.5–5.1)
PROT SERPL-MCNC: 6.6 GM/DL (ref 6–8.4)
RBC # BLD AUTO: 4.92 M/UL (ref 4.6–6.2)
RELATIVE EOSINOPHIL (OHS): 2.2 %
RELATIVE LYMPHOCYTE (OHS): 19 % (ref 18–48)
RELATIVE MONOCYTE (OHS): 5.4 % (ref 4–15)
RELATIVE NEUTROPHIL (OHS): 72.3 % (ref 38–73)
SODIUM SERPL-SCNC: 146 MMOL/L (ref 136–145)
WBC # BLD AUTO: 5.89 K/UL (ref 3.9–12.7)

## 2025-08-22 PROCEDURE — 85306 CLOT INHIBIT PROT S FREE: CPT

## 2025-08-22 PROCEDURE — 85025 COMPLETE CBC W/AUTO DIFF WBC: CPT

## 2025-08-22 PROCEDURE — 36415 COLL VENOUS BLD VENIPUNCTURE: CPT

## 2025-08-22 PROCEDURE — 80053 COMPREHEN METABOLIC PANEL: CPT

## 2025-08-22 PROCEDURE — 85306 CLOT INHIBIT PROT S FREE: CPT | Mod: 59

## 2025-08-25 ENCOUNTER — TELEPHONE (OUTPATIENT)
Dept: HEMATOLOGY/ONCOLOGY | Facility: CLINIC | Age: 50
End: 2025-08-25
Payer: COMMERCIAL

## 2025-08-25 ENCOUNTER — OFFICE VISIT (OUTPATIENT)
Dept: HEMATOLOGY/ONCOLOGY | Facility: CLINIC | Age: 50
End: 2025-08-25
Payer: COMMERCIAL

## 2025-08-25 ENCOUNTER — PATIENT MESSAGE (OUTPATIENT)
Dept: HEMATOLOGY/ONCOLOGY | Facility: CLINIC | Age: 50
End: 2025-08-25

## 2025-08-25 DIAGNOSIS — D68.59 PROTEIN S DEFICIENCY: ICD-10-CM

## 2025-08-25 DIAGNOSIS — D68.51 HETEROZYGOUS FACTOR V LEIDEN MUTATION: Primary | ICD-10-CM

## 2025-08-25 DIAGNOSIS — R53.83 FATIGUE, UNSPECIFIED TYPE: ICD-10-CM

## 2025-08-25 DIAGNOSIS — E66.01 SEVERE OBESITY (BMI 35.0-39.9) WITH COMORBIDITY: ICD-10-CM

## 2025-08-25 DIAGNOSIS — K75.1: ICD-10-CM

## 2025-08-25 DIAGNOSIS — R10.31 RIGHT LOWER QUADRANT PAIN: ICD-10-CM

## 2025-08-26 ENCOUNTER — HOSPITAL ENCOUNTER (EMERGENCY)
Facility: HOSPITAL | Age: 50
Discharge: HOME | End: 2025-08-26
Attending: EMERGENCY MEDICINE
Payer: COMMERCIAL

## 2025-08-26 LAB
PROT S ACT/NOR PPP: 90 % (ref 65–150)
W PROTEIN S ANTIGEN, FREE: 65 %

## 2025-08-28 DIAGNOSIS — D68.51 HETEROZYGOUS FACTOR V LEIDEN MUTATION: Primary | ICD-10-CM
